# Patient Record
Sex: FEMALE | Race: WHITE | NOT HISPANIC OR LATINO | Employment: FULL TIME | URBAN - NONMETROPOLITAN AREA
[De-identification: names, ages, dates, MRNs, and addresses within clinical notes are randomized per-mention and may not be internally consistent; named-entity substitution may affect disease eponyms.]

---

## 2017-03-29 ENCOUNTER — HOSPITAL ENCOUNTER (OUTPATIENT)
Dept: FAMILY MEDICINE CLINIC | Facility: CLINIC | Age: 42
Setting detail: SPECIMEN
Discharge: HOME | End: 2017-03-29
Attending: NURSE PRACTITIONER | Admitting: NURSE PRACTITIONER

## 2017-03-29 LAB
ALBUMIN SERPL-MCNC: 4 G/DL (ref 3.5–4.8)
ALBUMIN/GLOB SERPL: 1.1 {RATIO} (ref 1–1.7)
ALP SERPL-CCNC: 31 IU/L (ref 32–91)
ALT SERPL-CCNC: 32 IU/L (ref 14–54)
ANION GAP SERPL CALC-SCNC: 18.8 MMOL/L (ref 10–20)
AST SERPL-CCNC: 22 IU/L (ref 15–41)
BILIRUB SERPL-MCNC: 0.6 MG/DL (ref 0.3–1.2)
BUN SERPL-MCNC: 11 MG/DL (ref 8–20)
BUN/CREAT SERPL: 15.7 (ref 5.4–26.2)
CALCIUM SERPL-MCNC: 10.2 MG/DL (ref 8.9–10.3)
CHLORIDE SERPL-SCNC: 103 MMOL/L (ref 101–111)
CHOLEST SERPL-MCNC: 212 MG/DL
CHOLEST/HDLC SERPL: 4.6 {RATIO}
CONV CO2: 24 MMOL/L (ref 22–32)
CONV LDL CHOLESTEROL DIRECT: 130 MG/DL (ref 0–100)
CONV TOTAL PROTEIN: 7.5 G/DL (ref 6.1–7.9)
CREAT UR-MCNC: 0.7 MG/DL (ref 0.4–1)
GLOBULIN UR ELPH-MCNC: 3.5 G/DL (ref 2.5–3.8)
GLUCOSE SERPL-MCNC: 94 MG/DL (ref 65–99)
HDLC SERPL-MCNC: 46 MG/DL
LDLC/HDLC SERPL: 2.8 {RATIO}
LIPID INTERPRETATION: ABNORMAL
POTASSIUM SERPL-SCNC: 3.8 MMOL/L (ref 3.6–5.1)
SODIUM SERPL-SCNC: 142 MMOL/L (ref 136–144)
TRIGL SERPL-MCNC: 249 MG/DL
VLDLC SERPL CALC-MCNC: 35.9 MG/DL

## 2017-10-05 ENCOUNTER — HOSPITAL ENCOUNTER (OUTPATIENT)
Dept: FAMILY MEDICINE CLINIC | Facility: CLINIC | Age: 42
Setting detail: SPECIMEN
Discharge: HOME | End: 2017-10-05
Attending: FAMILY MEDICINE | Admitting: FAMILY MEDICINE

## 2017-10-05 LAB
ALBUMIN SERPL-MCNC: 4.1 G/DL (ref 3.5–4.8)
ALBUMIN/GLOB SERPL: 1.2 {RATIO} (ref 1–1.7)
ALP SERPL-CCNC: 32 IU/L (ref 32–91)
ALT SERPL-CCNC: 29 IU/L (ref 14–54)
ANION GAP SERPL CALC-SCNC: 12.3 MMOL/L (ref 10–20)
AST SERPL-CCNC: 22 IU/L (ref 15–41)
BILIRUB SERPL-MCNC: 0.5 MG/DL (ref 0.3–1.2)
BUN SERPL-MCNC: 8 MG/DL (ref 8–20)
BUN/CREAT SERPL: 10 (ref 5.4–26.2)
CALCIUM SERPL-MCNC: 9.7 MG/DL (ref 8.9–10.3)
CHLORIDE SERPL-SCNC: 106 MMOL/L (ref 101–111)
CHOLEST SERPL-MCNC: 191 MG/DL
CHOLEST/HDLC SERPL: 4.4 {RATIO}
CONV CO2: 26 MMOL/L (ref 22–32)
CONV LDL CHOLESTEROL DIRECT: 118 MG/DL (ref 0–100)
CONV TOTAL PROTEIN: 7.5 G/DL (ref 6.1–7.9)
CREAT UR-MCNC: 0.8 MG/DL (ref 0.4–1)
GLOBULIN UR ELPH-MCNC: 3.4 G/DL (ref 2.5–3.8)
GLUCOSE SERPL-MCNC: 105 MG/DL (ref 65–99)
HDLC SERPL-MCNC: 43 MG/DL
LDLC/HDLC SERPL: 2.7 {RATIO}
LIPID INTERPRETATION: ABNORMAL
POTASSIUM SERPL-SCNC: 4.3 MMOL/L (ref 3.6–5.1)
SODIUM SERPL-SCNC: 140 MMOL/L (ref 136–144)
TRIGL SERPL-MCNC: 231 MG/DL
TSH SERPL-ACNC: 1.39 UIU/ML (ref 0.34–5.6)
VLDLC SERPL CALC-MCNC: 29.3 MG/DL

## 2018-10-16 ENCOUNTER — HOSPITAL ENCOUNTER (OUTPATIENT)
Dept: FAMILY MEDICINE CLINIC | Facility: CLINIC | Age: 43
Setting detail: SPECIMEN
Discharge: HOME | End: 2018-10-16
Attending: FAMILY MEDICINE | Admitting: FAMILY MEDICINE

## 2018-10-16 LAB
ALBUMIN SERPL-MCNC: 4.1 G/DL (ref 3.5–4.8)
ALBUMIN/GLOB SERPL: 1.4 {RATIO} (ref 1–1.7)
ALP SERPL-CCNC: 34 IU/L (ref 32–91)
ALT SERPL-CCNC: 28 IU/L (ref 14–54)
ANION GAP SERPL CALC-SCNC: 14.8 MMOL/L (ref 10–20)
AST SERPL-CCNC: 17 IU/L (ref 15–41)
BILIRUB SERPL-MCNC: 0.7 MG/DL (ref 0.3–1.2)
BUN SERPL-MCNC: 8 MG/DL (ref 8–20)
BUN/CREAT SERPL: 13.3 (ref 5.4–26.2)
CALCIUM SERPL-MCNC: 9.6 MG/DL (ref 8.9–10.3)
CHLORIDE SERPL-SCNC: 102 MMOL/L (ref 101–111)
CHOLEST SERPL-MCNC: 243 MG/DL
CHOLEST/HDLC SERPL: 5.3 {RATIO}
CONV CO2: 27 MMOL/L (ref 22–32)
CONV LDL CHOLESTEROL DIRECT: 170 MG/DL (ref 0–100)
CONV TOTAL PROTEIN: 7.1 G/DL (ref 6.1–7.9)
CREAT UR-MCNC: 0.6 MG/DL (ref 0.4–1)
GLOBULIN UR ELPH-MCNC: 3 G/DL (ref 2.5–3.8)
GLUCOSE SERPL-MCNC: 87 MG/DL (ref 65–99)
HDLC SERPL-MCNC: 46 MG/DL
LDLC/HDLC SERPL: 3.7 {RATIO}
LIPID INTERPRETATION: ABNORMAL
POTASSIUM SERPL-SCNC: 3.8 MMOL/L (ref 3.6–5.1)
SODIUM SERPL-SCNC: 140 MMOL/L (ref 136–144)
TRIGL SERPL-MCNC: 361 MG/DL
VLDLC SERPL CALC-MCNC: 26.9 MG/DL

## 2018-10-17 LAB — HBA1C MFR BLD: 5.3 % (ref 0–5.6)

## 2019-01-11 ENCOUNTER — HOSPITAL ENCOUNTER (OUTPATIENT)
Dept: FAMILY MEDICINE CLINIC | Facility: CLINIC | Age: 44
Setting detail: SPECIMEN
Discharge: HOME | End: 2019-01-11
Attending: FAMILY MEDICINE | Admitting: FAMILY MEDICINE

## 2019-01-11 LAB
ALBUMIN SERPL-MCNC: 4.1 G/DL (ref 3.5–4.8)
ALBUMIN/GLOB SERPL: 1.2 {RATIO} (ref 1–1.7)
ALP SERPL-CCNC: 35 IU/L (ref 32–91)
ALT SERPL-CCNC: 22 IU/L (ref 14–54)
ANION GAP SERPL CALC-SCNC: 14.1 MMOL/L (ref 10–20)
AST SERPL-CCNC: 18 IU/L (ref 15–41)
BILIRUB SERPL-MCNC: 0.5 MG/DL (ref 0.3–1.2)
BUN SERPL-MCNC: 10 MG/DL (ref 8–20)
BUN/CREAT SERPL: 14.3 (ref 5.4–26.2)
CALCIUM SERPL-MCNC: 9.7 MG/DL (ref 8.9–10.3)
CHLORIDE SERPL-SCNC: 105 MMOL/L (ref 101–111)
CHOLEST SERPL-MCNC: 174 MG/DL
CHOLEST/HDLC SERPL: 4 {RATIO}
CONV CO2: 23 MMOL/L (ref 22–32)
CONV LDL CHOLESTEROL DIRECT: 107 MG/DL (ref 0–100)
CONV TOTAL PROTEIN: 7.4 G/DL (ref 6.1–7.9)
CREAT UR-MCNC: 0.7 MG/DL (ref 0.4–1)
GLOBULIN UR ELPH-MCNC: 3.3 G/DL (ref 2.5–3.8)
GLUCOSE SERPL-MCNC: 112 MG/DL (ref 65–99)
HDLC SERPL-MCNC: 44 MG/DL
LDLC/HDLC SERPL: 2.4 {RATIO}
LIPID INTERPRETATION: ABNORMAL
POTASSIUM SERPL-SCNC: 4.1 MMOL/L (ref 3.6–5.1)
SODIUM SERPL-SCNC: 138 MMOL/L (ref 136–144)
TRIGL SERPL-MCNC: 208 MG/DL
VLDLC SERPL CALC-MCNC: 23.3 MG/DL

## 2019-04-19 ENCOUNTER — HOSPITAL ENCOUNTER (OUTPATIENT)
Dept: FAMILY MEDICINE CLINIC | Facility: CLINIC | Age: 44
Setting detail: SPECIMEN
Discharge: HOME | End: 2019-04-19
Attending: FAMILY MEDICINE | Admitting: FAMILY MEDICINE

## 2019-04-19 LAB
ALBUMIN SERPL-MCNC: 4.1 G/DL (ref 3.5–4.8)
ALBUMIN/GLOB SERPL: 1.3 {RATIO} (ref 1–1.7)
ALP SERPL-CCNC: 34 IU/L (ref 32–91)
ALT SERPL-CCNC: 24 IU/L (ref 14–54)
ANION GAP SERPL CALC-SCNC: 14.3 MMOL/L (ref 10–20)
AST SERPL-CCNC: 15 IU/L (ref 15–41)
BILIRUB SERPL-MCNC: 0.8 MG/DL (ref 0.3–1.2)
BUN SERPL-MCNC: 8 MG/DL (ref 8–20)
BUN/CREAT SERPL: 10 (ref 5.4–26.2)
CALCIUM SERPL-MCNC: 9.8 MG/DL (ref 8.9–10.3)
CHLORIDE SERPL-SCNC: 107 MMOL/L (ref 101–111)
CHOLEST SERPL-MCNC: 150 MG/DL
CHOLEST/HDLC SERPL: 3.7 {RATIO}
CONV CO2: 24 MMOL/L (ref 22–32)
CONV LDL CHOLESTEROL DIRECT: 93 MG/DL (ref 0–100)
CONV TOTAL PROTEIN: 7.2 G/DL (ref 6.1–7.9)
CREAT UR-MCNC: 0.8 MG/DL (ref 0.4–1)
GLOBULIN UR ELPH-MCNC: 3.1 G/DL (ref 2.5–3.8)
GLUCOSE SERPL-MCNC: 94 MG/DL (ref 65–99)
HDLC SERPL-MCNC: 41 MG/DL
LDLC/HDLC SERPL: 2.3 {RATIO}
LIPID INTERPRETATION: NORMAL
POTASSIUM SERPL-SCNC: 4.3 MMOL/L (ref 3.6–5.1)
SODIUM SERPL-SCNC: 141 MMOL/L (ref 136–144)
TRIGL SERPL-MCNC: 118 MG/DL
VLDLC SERPL CALC-MCNC: 16.3 MG/DL

## 2019-07-29 DIAGNOSIS — R73.9 HYPERGLYCEMIA: ICD-10-CM

## 2019-07-29 DIAGNOSIS — K21.9 GASTROESOPHAGEAL REFLUX DISEASE WITHOUT ESOPHAGITIS: ICD-10-CM

## 2019-07-29 DIAGNOSIS — E78.5 HYPERLIPIDEMIA, UNSPECIFIED HYPERLIPIDEMIA TYPE: Primary | ICD-10-CM

## 2019-07-29 DIAGNOSIS — E66.9 CLASS 2 OBESITY WITHOUT SERIOUS COMORBIDITY IN ADULT, UNSPECIFIED BMI, UNSPECIFIED OBESITY TYPE: ICD-10-CM

## 2019-11-22 ENCOUNTER — CLINICAL SUPPORT (OUTPATIENT)
Dept: FAMILY MEDICINE CLINIC | Facility: CLINIC | Age: 44
End: 2019-11-22

## 2019-11-22 DIAGNOSIS — Z23 NEED FOR IMMUNIZATION AGAINST INFLUENZA: Primary | ICD-10-CM

## 2019-11-22 LAB
ALBUMIN SERPL-MCNC: 4.8 G/DL (ref 3.5–5.2)
ALBUMIN/GLOB SERPL: 1.5 G/DL
ALP SERPL-CCNC: 36 U/L (ref 39–117)
ALT SERPL W P-5'-P-CCNC: 17 U/L (ref 1–33)
ANION GAP SERPL CALCULATED.3IONS-SCNC: 11.5 MMOL/L (ref 5–15)
AST SERPL-CCNC: 14 U/L (ref 1–32)
BASOPHILS # BLD AUTO: 0.09 10*3/MM3 (ref 0–0.2)
BASOPHILS NFR BLD AUTO: 0.9 % (ref 0–1.5)
BILIRUB SERPL-MCNC: 0.5 MG/DL (ref 0.2–1.2)
BUN BLD-MCNC: 9 MG/DL (ref 6–20)
BUN/CREAT SERPL: 12.9 (ref 7–25)
CALCIUM SPEC-SCNC: 10.1 MG/DL (ref 8.6–10.5)
CHLORIDE SERPL-SCNC: 103 MMOL/L (ref 98–107)
CHOLEST SERPL-MCNC: 178 MG/DL (ref 0–200)
CO2 SERPL-SCNC: 25.5 MMOL/L (ref 22–29)
CREAT BLD-MCNC: 0.7 MG/DL (ref 0.57–1)
DEPRECATED RDW RBC AUTO: 40.2 FL (ref 37–54)
EOSINOPHIL # BLD AUTO: 0.83 10*3/MM3 (ref 0–0.4)
EOSINOPHIL NFR BLD AUTO: 8.3 % (ref 0.3–6.2)
ERYTHROCYTE [DISTWIDTH] IN BLOOD BY AUTOMATED COUNT: 12 % (ref 12.3–15.4)
GFR SERPL CREATININE-BSD FRML MDRD: 90 ML/MIN/1.73
GLOBULIN UR ELPH-MCNC: 3.3 GM/DL
GLUCOSE BLD-MCNC: 121 MG/DL (ref 65–99)
HBA1C MFR BLD: 5.1 % (ref 3.5–5.6)
HCT VFR BLD AUTO: 43 % (ref 34–46.6)
HDLC SERPL-MCNC: 45 MG/DL (ref 40–60)
HGB BLD-MCNC: 15.4 G/DL (ref 12–15.9)
IMM GRANULOCYTES # BLD AUTO: 0.03 10*3/MM3 (ref 0–0.05)
IMM GRANULOCYTES NFR BLD AUTO: 0.3 % (ref 0–0.5)
LDLC SERPL CALC-MCNC: 83 MG/DL (ref 0–100)
LDLC/HDLC SERPL: 1.84 {RATIO}
LYMPHOCYTES # BLD AUTO: 3.45 10*3/MM3 (ref 0.7–3.1)
LYMPHOCYTES NFR BLD AUTO: 34.3 % (ref 19.6–45.3)
MCH RBC QN AUTO: 33 PG (ref 26.6–33)
MCHC RBC AUTO-ENTMCNC: 35.8 G/DL (ref 31.5–35.7)
MCV RBC AUTO: 92.1 FL (ref 79–97)
MONOCYTES # BLD AUTO: 0.56 10*3/MM3 (ref 0.1–0.9)
MONOCYTES NFR BLD AUTO: 5.6 % (ref 5–12)
NEUTROPHILS # BLD AUTO: 5.1 10*3/MM3 (ref 1.7–7)
NEUTROPHILS NFR BLD AUTO: 50.6 % (ref 42.7–76)
NRBC BLD AUTO-RTO: 0 /100 WBC (ref 0–0.2)
PLATELET # BLD AUTO: 337 10*3/MM3 (ref 140–450)
PMV BLD AUTO: 11 FL (ref 6–12)
POTASSIUM BLD-SCNC: 4.5 MMOL/L (ref 3.5–5.2)
PROT SERPL-MCNC: 8.1 G/DL (ref 6–8.5)
RBC # BLD AUTO: 4.67 10*6/MM3 (ref 3.77–5.28)
SODIUM BLD-SCNC: 140 MMOL/L (ref 136–145)
TRIGL SERPL-MCNC: 250 MG/DL (ref 0–150)
TSH SERPL DL<=0.05 MIU/L-ACNC: 1.44 UIU/ML (ref 0.27–4.2)
VLDLC SERPL-MCNC: 50 MG/DL (ref 5–40)
WBC NRBC COR # BLD: 10.06 10*3/MM3 (ref 3.4–10.8)

## 2019-11-22 PROCEDURE — 80053 COMPREHEN METABOLIC PANEL: CPT | Performed by: FAMILY MEDICINE

## 2019-11-22 PROCEDURE — 83036 HEMOGLOBIN GLYCOSYLATED A1C: CPT | Performed by: FAMILY MEDICINE

## 2019-11-22 PROCEDURE — 84443 ASSAY THYROID STIM HORMONE: CPT | Performed by: FAMILY MEDICINE

## 2019-11-22 PROCEDURE — 36415 COLL VENOUS BLD VENIPUNCTURE: CPT | Performed by: FAMILY MEDICINE

## 2019-11-22 PROCEDURE — 85025 COMPLETE CBC W/AUTO DIFF WBC: CPT | Performed by: FAMILY MEDICINE

## 2019-11-22 PROCEDURE — 90471 IMMUNIZATION ADMIN: CPT | Performed by: FAMILY MEDICINE

## 2019-11-22 PROCEDURE — 80061 LIPID PANEL: CPT | Performed by: FAMILY MEDICINE

## 2019-11-22 PROCEDURE — 90674 CCIIV4 VAC NO PRSV 0.5 ML IM: CPT | Performed by: FAMILY MEDICINE

## 2019-12-27 RX ORDER — ATORVASTATIN CALCIUM 40 MG/1
TABLET, FILM COATED ORAL
Qty: 90 TABLET | Refills: 0 | Status: SHIPPED | OUTPATIENT
Start: 2019-12-27 | End: 2020-05-08

## 2020-05-08 ENCOUNTER — TELEMEDICINE (OUTPATIENT)
Dept: FAMILY MEDICINE CLINIC | Facility: CLINIC | Age: 45
End: 2020-05-08

## 2020-05-08 ENCOUNTER — HOSPITAL ENCOUNTER (OUTPATIENT)
Facility: HOSPITAL | Age: 45
Setting detail: OBSERVATION
Discharge: HOME | End: 2020-05-09
Attending: INTERNAL MEDICINE | Admitting: INTERNAL MEDICINE

## 2020-05-08 ENCOUNTER — APPOINTMENT (OUTPATIENT)
Dept: GENERAL RADIOLOGY | Facility: HOSPITAL | Age: 45
End: 2020-05-08

## 2020-05-08 DIAGNOSIS — R06.02 SHORTNESS OF BREATH: ICD-10-CM

## 2020-05-08 DIAGNOSIS — R07.9 CHEST PAIN, UNSPECIFIED TYPE: Primary | ICD-10-CM

## 2020-05-08 DIAGNOSIS — R06.00 DYSPNEA, UNSPECIFIED TYPE: ICD-10-CM

## 2020-05-08 DIAGNOSIS — Z20.828 EXPOSURE TO SARS-ASSOCIATED CORONAVIRUS: ICD-10-CM

## 2020-05-08 PROBLEM — I10 HTN (HYPERTENSION): Chronic | Status: ACTIVE | Noted: 2020-05-08

## 2020-05-08 PROBLEM — E78.5 HYPERLIPIDEMIA: Chronic | Status: ACTIVE | Noted: 2019-07-29

## 2020-05-08 PROBLEM — F41.9 ANXIETY DISORDER: Chronic | Status: ACTIVE | Noted: 2020-05-08

## 2020-05-08 PROBLEM — K21.9 GASTROESOPHAGEAL REFLUX DISEASE: Chronic | Status: ACTIVE | Noted: 2019-07-29

## 2020-05-08 LAB
ALBUMIN SERPL-MCNC: 4.7 G/DL (ref 3.5–5.2)
ALBUMIN/GLOB SERPL: 1.4 G/DL
ALP SERPL-CCNC: 33 U/L (ref 39–117)
ALT SERPL W P-5'-P-CCNC: 18 U/L (ref 1–33)
ANION GAP SERPL CALCULATED.3IONS-SCNC: 14 MMOL/L (ref 5–15)
AST SERPL-CCNC: 17 U/L (ref 1–32)
B PERT DNA SPEC QL NAA+PROBE: NOT DETECTED
BASOPHILS # BLD AUTO: 0.1 10*3/MM3 (ref 0–0.2)
BASOPHILS NFR BLD AUTO: 0.5 % (ref 0–1.5)
BILIRUB SERPL-MCNC: 0.5 MG/DL (ref 0.2–1.2)
BUN BLD-MCNC: 11 MG/DL (ref 6–20)
BUN/CREAT SERPL: 15.5 (ref 7–25)
C PNEUM DNA NPH QL NAA+NON-PROBE: NOT DETECTED
CALCIUM SPEC-SCNC: 10.2 MG/DL (ref 8.6–10.5)
CHLORIDE SERPL-SCNC: 102 MMOL/L (ref 98–107)
CK SERPL-CCNC: 51 U/L (ref 20–180)
CO2 SERPL-SCNC: 26 MMOL/L (ref 22–29)
CREAT BLD-MCNC: 0.71 MG/DL (ref 0.57–1)
CRP SERPL-MCNC: 0.22 MG/DL (ref 0–0.5)
D DIMER PPP FEU-MCNC: 0.19 MCGFEU/ML (ref 0.17–0.59)
DEPRECATED RDW RBC AUTO: 40.7 FL (ref 37–54)
EOSINOPHIL # BLD AUTO: 0.6 10*3/MM3 (ref 0–0.4)
EOSINOPHIL NFR BLD AUTO: 5.9 % (ref 0.3–6.2)
ERYTHROCYTE [DISTWIDTH] IN BLOOD BY AUTOMATED COUNT: 12.5 % (ref 12.3–15.4)
FERRITIN SERPL-MCNC: 131.4 NG/ML (ref 13–150)
FLUAV H1 2009 PAND RNA NPH QL NAA+PROBE: NOT DETECTED
FLUAV H1 HA GENE NPH QL NAA+PROBE: NOT DETECTED
FLUAV H3 RNA NPH QL NAA+PROBE: NOT DETECTED
FLUAV SUBTYP SPEC NAA+PROBE: NOT DETECTED
FLUBV RNA ISLT QL NAA+PROBE: NOT DETECTED
GFR SERPL CREATININE-BSD FRML MDRD: 89 ML/MIN/1.73
GLOBULIN UR ELPH-MCNC: 3.3 GM/DL
GLUCOSE BLD-MCNC: 117 MG/DL (ref 65–99)
HADV DNA SPEC NAA+PROBE: NOT DETECTED
HCOV 229E RNA SPEC QL NAA+PROBE: NOT DETECTED
HCOV HKU1 RNA SPEC QL NAA+PROBE: NOT DETECTED
HCOV NL63 RNA SPEC QL NAA+PROBE: NOT DETECTED
HCOV OC43 RNA SPEC QL NAA+PROBE: NOT DETECTED
HCT VFR BLD AUTO: 44.3 % (ref 34–46.6)
HGB BLD-MCNC: 15.4 G/DL (ref 12–15.9)
HMPV RNA NPH QL NAA+NON-PROBE: NOT DETECTED
HOLD SPECIMEN: NORMAL
HOLD SPECIMEN: NORMAL
HPIV1 RNA SPEC QL NAA+PROBE: NOT DETECTED
HPIV2 RNA SPEC QL NAA+PROBE: NOT DETECTED
HPIV3 RNA NPH QL NAA+PROBE: NOT DETECTED
HPIV4 P GENE NPH QL NAA+PROBE: NOT DETECTED
LDH SERPL-CCNC: 164 U/L (ref 135–214)
LYMPHOCYTES # BLD AUTO: 4.6 10*3/MM3 (ref 0.7–3.1)
LYMPHOCYTES NFR BLD AUTO: 44.2 % (ref 19.6–45.3)
M PNEUMO IGG SER IA-ACNC: NOT DETECTED
MAGNESIUM SERPL-MCNC: 1.7 MG/DL (ref 1.6–2.6)
MCH RBC QN AUTO: 32.1 PG (ref 26.6–33)
MCHC RBC AUTO-ENTMCNC: 34.8 G/DL (ref 31.5–35.7)
MCV RBC AUTO: 92 FL (ref 79–97)
MONOCYTES # BLD AUTO: 0.5 10*3/MM3 (ref 0.1–0.9)
MONOCYTES NFR BLD AUTO: 4.9 % (ref 5–12)
NEUTROPHILS # BLD AUTO: 4.7 10*3/MM3 (ref 1.7–7)
NEUTROPHILS NFR BLD AUTO: 44.5 % (ref 42.7–76)
NRBC BLD AUTO-RTO: 0.1 /100 WBC (ref 0–0.2)
NT-PROBNP SERPL-MCNC: 33.8 PG/ML (ref 5–450)
PLATELET # BLD AUTO: 316 10*3/MM3 (ref 140–450)
PMV BLD AUTO: 8.7 FL (ref 6–12)
POTASSIUM BLD-SCNC: 3.5 MMOL/L (ref 3.5–5.2)
PROCALCITONIN SERPL-MCNC: 0.03 NG/ML (ref 0.1–0.25)
PROT SERPL-MCNC: 8 G/DL (ref 6–8.5)
RBC # BLD AUTO: 4.81 10*6/MM3 (ref 3.77–5.28)
RHINOVIRUS RNA SPEC NAA+PROBE: NOT DETECTED
RSV RNA NPH QL NAA+NON-PROBE: NOT DETECTED
SARS-COV-2 RNA RESP QL NAA+PROBE: NOT DETECTED
SODIUM BLD-SCNC: 142 MMOL/L (ref 136–145)
TROPONIN T SERPL-MCNC: <0.01 NG/ML (ref 0–0.03)
TROPONIN T SERPL-MCNC: <0.01 NG/ML (ref 0–0.03)
TSH SERPL DL<=0.05 MIU/L-ACNC: 2.11 UIU/ML (ref 0.27–4.2)
WBC NRBC COR # BLD: 10.5 10*3/MM3 (ref 3.4–10.8)
WHOLE BLOOD HOLD SPECIMEN: NORMAL
WHOLE BLOOD HOLD SPECIMEN: NORMAL

## 2020-05-08 PROCEDURE — 84484 ASSAY OF TROPONIN QUANT: CPT | Performed by: NURSE PRACTITIONER

## 2020-05-08 PROCEDURE — 80053 COMPREHEN METABOLIC PANEL: CPT | Performed by: PHYSICIAN ASSISTANT

## 2020-05-08 PROCEDURE — 84443 ASSAY THYROID STIM HORMONE: CPT | Performed by: NURSE PRACTITIONER

## 2020-05-08 PROCEDURE — 83880 ASSAY OF NATRIURETIC PEPTIDE: CPT | Performed by: PHYSICIAN ASSISTANT

## 2020-05-08 PROCEDURE — G0378 HOSPITAL OBSERVATION PER HR: HCPCS

## 2020-05-08 PROCEDURE — 99284 EMERGENCY DEPT VISIT MOD MDM: CPT

## 2020-05-08 PROCEDURE — 71045 X-RAY EXAM CHEST 1 VIEW: CPT

## 2020-05-08 PROCEDURE — 83735 ASSAY OF MAGNESIUM: CPT | Performed by: NURSE PRACTITIONER

## 2020-05-08 PROCEDURE — 0099U HC BIOFIRE FILMARRAY RESP PANEL 1: CPT | Performed by: INTERNAL MEDICINE

## 2020-05-08 PROCEDURE — 93005 ELECTROCARDIOGRAM TRACING: CPT | Performed by: INTERNAL MEDICINE

## 2020-05-08 PROCEDURE — 85379 FIBRIN DEGRADATION QUANT: CPT | Performed by: NURSE PRACTITIONER

## 2020-05-08 PROCEDURE — 99220 PR INITIAL OBSERVATION CARE/DAY 70 MINUTES: CPT | Performed by: INTERNAL MEDICINE

## 2020-05-08 PROCEDURE — 83615 LACTATE (LD) (LDH) ENZYME: CPT | Performed by: PHYSICIAN ASSISTANT

## 2020-05-08 PROCEDURE — 86140 C-REACTIVE PROTEIN: CPT | Performed by: PHYSICIAN ASSISTANT

## 2020-05-08 PROCEDURE — 82550 ASSAY OF CK (CPK): CPT | Performed by: NURSE PRACTITIONER

## 2020-05-08 PROCEDURE — 99213 OFFICE O/P EST LOW 20 MIN: CPT | Performed by: FAMILY MEDICINE

## 2020-05-08 PROCEDURE — 87635 SARS-COV-2 COVID-19 AMP PRB: CPT | Performed by: INTERNAL MEDICINE

## 2020-05-08 PROCEDURE — 85025 COMPLETE CBC W/AUTO DIFF WBC: CPT | Performed by: PHYSICIAN ASSISTANT

## 2020-05-08 PROCEDURE — 84145 PROCALCITONIN (PCT): CPT | Performed by: PHYSICIAN ASSISTANT

## 2020-05-08 PROCEDURE — 93005 ELECTROCARDIOGRAM TRACING: CPT

## 2020-05-08 PROCEDURE — 84484 ASSAY OF TROPONIN QUANT: CPT | Performed by: PHYSICIAN ASSISTANT

## 2020-05-08 PROCEDURE — 82728 ASSAY OF FERRITIN: CPT | Performed by: NURSE PRACTITIONER

## 2020-05-08 RX ORDER — SERTRALINE HYDROCHLORIDE 100 MG/1
100 TABLET, FILM COATED ORAL DAILY
Status: DISCONTINUED | OUTPATIENT
Start: 2020-05-08 | End: 2020-05-09 | Stop reason: HOSPADM

## 2020-05-08 RX ORDER — ONDANSETRON 4 MG/1
4 TABLET, FILM COATED ORAL EVERY 6 HOURS PRN
Status: DISCONTINUED | OUTPATIENT
Start: 2020-05-08 | End: 2020-05-09 | Stop reason: HOSPADM

## 2020-05-08 RX ORDER — SODIUM CHLORIDE 0.9 % (FLUSH) 0.9 %
10 SYRINGE (ML) INJECTION EVERY 12 HOURS SCHEDULED
Status: DISCONTINUED | OUTPATIENT
Start: 2020-05-08 | End: 2020-05-09 | Stop reason: HOSPADM

## 2020-05-08 RX ORDER — SODIUM CHLORIDE 0.9 % (FLUSH) 0.9 %
10 SYRINGE (ML) INJECTION AS NEEDED
Status: DISCONTINUED | OUTPATIENT
Start: 2020-05-08 | End: 2020-05-09 | Stop reason: HOSPADM

## 2020-05-08 RX ORDER — ACETAMINOPHEN 650 MG/1
650 SUPPOSITORY RECTAL EVERY 4 HOURS PRN
Status: DISCONTINUED | OUTPATIENT
Start: 2020-05-08 | End: 2020-05-09 | Stop reason: HOSPADM

## 2020-05-08 RX ORDER — ATORVASTATIN CALCIUM 40 MG/1
40 TABLET, FILM COATED ORAL NIGHTLY
COMMUNITY
End: 2020-05-18

## 2020-05-08 RX ORDER — PANTOPRAZOLE SODIUM 40 MG/1
40 TABLET, DELAYED RELEASE ORAL EVERY MORNING
Status: DISCONTINUED | OUTPATIENT
Start: 2020-05-09 | End: 2020-05-09

## 2020-05-08 RX ORDER — MAGNESIUM SULFATE HEPTAHYDRATE 40 MG/ML
2 INJECTION, SOLUTION INTRAVENOUS AS NEEDED
Status: DISCONTINUED | OUTPATIENT
Start: 2020-05-08 | End: 2020-05-09 | Stop reason: HOSPADM

## 2020-05-08 RX ORDER — ASPIRIN 325 MG
325 TABLET, DELAYED RELEASE (ENTERIC COATED) ORAL DAILY
Status: DISCONTINUED | OUTPATIENT
Start: 2020-05-09 | End: 2020-05-09 | Stop reason: HOSPADM

## 2020-05-08 RX ORDER — HYDRALAZINE HYDROCHLORIDE 20 MG/ML
10 INJECTION INTRAMUSCULAR; INTRAVENOUS EVERY 6 HOURS PRN
Status: DISCONTINUED | OUTPATIENT
Start: 2020-05-08 | End: 2020-05-09 | Stop reason: HOSPADM

## 2020-05-08 RX ORDER — ASPIRIN 81 MG/1
324 TABLET, CHEWABLE ORAL ONCE
Status: COMPLETED | OUTPATIENT
Start: 2020-05-08 | End: 2020-05-08

## 2020-05-08 RX ORDER — MAGNESIUM SULFATE 1 G/100ML
1 INJECTION INTRAVENOUS AS NEEDED
Status: DISCONTINUED | OUTPATIENT
Start: 2020-05-08 | End: 2020-05-09 | Stop reason: HOSPADM

## 2020-05-08 RX ORDER — POTASSIUM CHLORIDE 20 MEQ/1
40 TABLET, EXTENDED RELEASE ORAL AS NEEDED
Status: DISCONTINUED | OUTPATIENT
Start: 2020-05-08 | End: 2020-05-09 | Stop reason: HOSPADM

## 2020-05-08 RX ORDER — ATORVASTATIN CALCIUM 40 MG/1
40 TABLET, FILM COATED ORAL NIGHTLY
Status: DISCONTINUED | OUTPATIENT
Start: 2020-05-08 | End: 2020-05-09 | Stop reason: HOSPADM

## 2020-05-08 RX ORDER — FOLIC ACID/MULTIVIT,IRON,MINER .4-18-35
1 TABLET,CHEWABLE ORAL DAILY
Status: DISCONTINUED | OUTPATIENT
Start: 2020-05-08 | End: 2020-05-09 | Stop reason: HOSPADM

## 2020-05-08 RX ORDER — NITROGLYCERIN 0.4 MG/1
0.4 TABLET SUBLINGUAL
Status: DISCONTINUED | OUTPATIENT
Start: 2020-05-08 | End: 2020-05-09 | Stop reason: HOSPADM

## 2020-05-08 RX ORDER — AMLODIPINE BESYLATE 5 MG/1
5 TABLET ORAL
Status: DISCONTINUED | OUTPATIENT
Start: 2020-05-08 | End: 2020-05-09 | Stop reason: HOSPADM

## 2020-05-08 RX ORDER — ACETAMINOPHEN 325 MG/1
650 TABLET ORAL EVERY 4 HOURS PRN
Status: DISCONTINUED | OUTPATIENT
Start: 2020-05-08 | End: 2020-05-09 | Stop reason: HOSPADM

## 2020-05-08 RX ORDER — ONDANSETRON 2 MG/ML
4 INJECTION INTRAMUSCULAR; INTRAVENOUS EVERY 6 HOURS PRN
Status: DISCONTINUED | OUTPATIENT
Start: 2020-05-08 | End: 2020-05-09 | Stop reason: HOSPADM

## 2020-05-08 RX ORDER — ACETAMINOPHEN 160 MG/5ML
650 SOLUTION ORAL EVERY 4 HOURS PRN
Status: DISCONTINUED | OUTPATIENT
Start: 2020-05-08 | End: 2020-05-09 | Stop reason: HOSPADM

## 2020-05-08 RX ADMIN — Medication 10 ML: at 13:47

## 2020-05-08 RX ADMIN — ASPIRIN 81 MG 324 MG: 81 TABLET ORAL at 10:27

## 2020-05-08 RX ADMIN — Medication 10 ML: at 20:34

## 2020-05-08 RX ADMIN — Medication 1 TABLET: at 13:41

## 2020-05-08 RX ADMIN — ATORVASTATIN CALCIUM 40 MG: 40 TABLET, FILM COATED ORAL at 20:33

## 2020-05-08 RX ADMIN — SERTRALINE 100 MG: 100 TABLET, FILM COATED ORAL at 13:41

## 2020-05-08 RX ADMIN — AMLODIPINE BESYLATE 5 MG: 5 TABLET ORAL at 13:41

## 2020-05-08 NOTE — H&P
"      [facility] Medicine Services      Patient Name: Evelyn Garcia  : 1975  MRN: [id number]  Primary Care Physician: Kathia Cooper MD  Date of admission: 2020    Patient Care Team:  Kathia Cooper MD as PCP - General          Subjective   History Present Illness     Chief Complaint:   Chief Complaint   Patient presents with   • Shortness of Breath       Ms. Garcia is a 45 y.o. with a past medical history of anxiety, GERD, and hyperlipidemia who presented to [facility] on 2020 with complaints of chest pain shortness of breath.  Patient states her chest pain started yesterday.  Her chest pain has been radiating into her jaw.  Resting makes her chest pain better.  She denies any aggravating factors.  She describes her chest pain as \"heaviness \"and rates it at 4 out of 10 at this time.  This morning she had a telehealth visit with her PCP around 8:30 AM and was told to come to the ED.  Patient also reported she has had been more short of breath that started at the beginning of this week.  She denies any aggravating or alleviating factors.  Patient reports she had nausea, loss of taste and smell, and mild diarrhea last week.  She is also had a cough, however blamed this on seasonal allergies.  Patient denies any recent vomiting, fever, chills, or coming in contact with anyone sick.  Patient states she is never had a stress test.  Her mom and dad both passed away of cardiac disease.    In the ED, chest x-ray unremarkable.  EKG showed sinus rhythm.  All labs unremarkable except pro Fabian 0.03.  Respiratory panel pending. COVID test pending.  All vital signs stable upon admission except blood pressure 154/101.  Patient received aspirin in the ED.    Review of Systems   HENT:        Lost of taste and smell last week   Cardiovascular: Positive for chest pain.   Respiratory: Positive for cough and shortness of breath.    Skin: Negative.    Musculoskeletal: Negative.    "   Gastrointestinal: Positive for diarrhea and nausea.   Genitourinary: Negative.    Neurological: Negative.    Psychiatric/Behavioral: Negative.            Personal History     Past Medical History:   Past Medical History:   Diagnosis Date   • Anxiety    • GERD (gastroesophageal reflux disease)    • Hypertension        Surgical History:      Past Surgical History:   Procedure Laterality Date   •  SECTION      X2   • CHOLECYSTECTOMY     • HERNIA REPAIR      X3   • HYSTERECTOMY     • RECTAL POLYPECTOMY             Family History: family history includes Heart disease in her father and mother. Otherwise pertinent FHx was reviewed and unremarkable.     Social History:  reports that she has never smoked. She has never used smokeless tobacco. She reports that she drinks alcohol. She reports that she does not use drugs.      Medications:  Prior to Admission medications    Medication Sig Start Date End Date Taking? Authorizing Provider   atorvastatin (LIPITOR) 40 MG tablet Take 40 mg by mouth Every Night.   Yes Provider, MD Monica   lansoprazole (PREVACID) 15 MG capsule Take 15 mg by mouth Daily. 17  Yes Emergency, Nurse Epic, RN   Multiple Vitamins-Minerals (MULTIVITAMIN ADULT PO) Take 1 tablet by mouth Daily.   Yes Emergency, Nurse Evelyn RN   sertraline (ZOLOFT) 100 MG tablet Take 100 mg by mouth Daily. 17  Yes Emergency, Nurse CARMEN Rivas   albuterol sulfate HFA (ProAir HFA) 108 (90 Base) MCG/ACT inhaler  3/19/15 5/8/20  Emergency, Nurse Evelyn RN   aspirin 81 MG EC tablet Take 81 mg by mouth Daily.  20  Emergency, Nurse Evelyn RN   atorvastatin (LIPITOR) 40 MG tablet TAKE 1 TABLET BY MOUTH ONCE DAILY 19  Kathia Cooper MD   B COMPLEX-C-E PO Take  by mouth Daily.  20  Emergency, Nurse CARMEN Rivas   Cholecalciferol (VITAMIN D3) 125 MCG (5000 UT) capsule capsule Take  by mouth Daily.  20  Emergency, Nurse Evelyn RN   esomeprazole (nexIUM) 40 MG capsule Take 40 mg by mouth Daily.   5/8/20  Emergency, Nurse Evelyn, RN   Ibuprofen 200 MG capsule Take 200 mg by mouth.  5/8/20  Emergency, Nurse Evelyn, RN   methylPREDNISolone (MEDROL, DEBRA,) 4 MG tablet Take as directed on package instructions. 3/1/20 5/8/20  Nela Sprague PA   oxybutynin XL (DITROPAN-XL) 10 MG 24 hr tablet  3/29/17 5/8/20  Emergency, Nurse Evelyn RN       Allergies:    Allergies   Allergen Reactions   • Butorphanol Unknown - High Severity     Slows breathing-unresponsive     • Hydromorphone Hcl Unknown - High Severity     Slows breathing -unresponsive   • Nalbuphine Unknown - High Severity       Objective   Objective     Vital Signs  Temp:  [97.8 °F (36.6 °C)-98 °F (36.7 °C)] 98 °F (36.7 °C)  Heart Rate:  [70-80] 80  Resp:  [16-22] 16  BP: (136-154)/() 136/89  SpO2:  [97 %-99 %] 97 %  on   ;   Device (Oxygen Therapy): room air  Body mass index is 29.41 kg/m².    Physical Exam ATTENDING MD TO COMPLETE     Results Review:  I have personally reviewed most recent cardiac tracings, lab results and radiology images and interpretations and agree with findings.    Results from last 7 days   Lab Units 05/08/20  0959   WBC 10*3/mm3 10.50   HEMOGLOBIN g/dL 15.4   HEMATOCRIT % 44.3   PLATELETS 10*3/mm3 316     Results from last 7 days   Lab Units 05/08/20  0959   SODIUM mmol/L 142   POTASSIUM mmol/L 3.5   CHLORIDE mmol/L 102   CO2 mmol/L 26.0   BUN mg/dL 11   CREATININE mg/dL 0.71   GLUCOSE mg/dL 117*   CALCIUM mg/dL 10.2   ALT (SGPT) U/L 18   AST (SGOT) U/L 17   TROPONIN T ng/mL <0.010   PROBNP pg/mL 33.8   PROCALCITONIN ng/mL 0.03*     Estimated Creatinine Clearance: 85.3 mL/min (by C-G formula based on SCr of 0.71 mg/dL).  Brief Urine Lab Results     None          Microbiology Results (last 10 days)     Procedure Component Value - Date/Time    Respiratory Panel, PCR - Swab, Nasopharynx [[id number]]  (Normal) Collected:  05/08/20 0959    Lab Status:  Final result Specimen:  Swab from Nasopharynx Updated:  05/08/20 1145     ADENOVIRUS,  PCR Not Detected     Coronavirus 229E Not Detected     Coronavirus HKU1 Not Detected     Coronavirus NL63 Not Detected     Coronavirus OC43 Not Detected     Human Metapneumovirus Not Detected     Human Rhinovirus/Enterovirus Not Detected     Influenza B PCR Not Detected     Parainfluenza Virus 1 Not Detected     Parainfluenza Virus 2 Not Detected     Parainfluenza Virus 3 Not Detected     Parainfluenza Virus 4 Not Detected     Bordetella pertussis pcr Not Detected     Influenza A H1 2009 PCR Not Detected     Chlamydophila pneumoniae PCR Not Detected     Mycoplasma pneumo by PCR Not Detected     Influenza A PCR Not Detected     Influenza A H3 Not Detected     Influenza A H1 Not Detected     RSV, PCR Not Detected    Narrative:       The coronavirus on the RVP is NOT COVID-19 and is NOT indicative of infection with COVID-19.           ECG/EMG Results (most recent)     Procedure Component Value Units Date/Time    ECG 12 Lead [[id number]] Collected:  05/08/20 0955     Updated:  05/08/20 0956    Narrative:       HEART RATE= 79  bpm  RR Interval= 760  ms  PA Interval= 154  ms  P Horizontal Axis= 3  deg  P Front Axis= 44  deg  QRSD Interval= 85  ms  QT Interval= 378  ms  QRS Axis= 27  deg  T Wave Axis= 49  deg  - OTHERWISE NORMAL ECG -  Sinus rhythm  Low voltage, precordial leads  When compared with ECG of 27-Jan-2016 11:27:58,  Significant axis, voltage or hypertrophy change  Electronically Signed By:   Date and Time of Study: 2020-05-08 09:55:12                    Xr Chest Ap    Result Date: 5/8/2020  No acute cardiopulmonary abnormality.  Electronically Signed By-Jesscia Giang MD On:5/8/2020 10:33 AM This report was finalized on [id number] by  Jessica Giang MD.        Estimated Creatinine Clearance: 85.3 mL/min (by C-G formula based on SCr of 0.71 mg/dL).    Assessment/Plan   Assessment/Plan       Active Hospital Problems    Diagnosis  POA   • **Chest pain [R07.9]  Yes     Priority: High   • Shortness of breath [R06.02]   Yes     Priority: High   • HTN (hypertension) [I10]  Yes   • Anxiety disorder [F41.9]  Yes   • Gastroesophageal reflux disease [K21.9]  Yes   • Hyperlipidemia [E78.5]  Yes      Resolved Hospital Problems   No resolved problems to display.       Acute chest pain  - Rule out ACS   -Chest x-ray unremarkable.    - EKG showed sinus rhythm.   -Troponin x1 normal-trend  -proBNP 33.8  -Continuous cardiac monitoring  -Check BMP, TSH, mag  -Aspirin and Nitrostat ordered  -Treadmill stress test ordered    Shortness of breath  - Patient currently on room air   -Chest x-ray unremarkable for infection  -Respiratory panel pending  -COVID test pending  -Pro-Fabian 0.03  -CRP 0.22  -    -White blood cells 10.5-monitor CBC  -Check ferritin, CK, and d-dimer  -Enhanced contact/droplet precautions    Essential hypertension  -Uncontrolled blood pressure 154/101 upon admission  -Monitor blood pressure  -Reported in patient's history, however patient is not on any home medications  -IV hydralazine ordered PRN   - Start Norvasc 5mg daily     GERD  -Continue Prevacid    Hyperlipidemia  -Continue statin    Anxiety  -Continue Zoloft    VTE Prophylaxis- SCD     CODE STATUS:    Code Status and Medical Interventions:   Ordered at: 05/08/20 1247     Level Of Support Discussed With:    Patient     Code Status:    CPR     Medical Interventions (Level of Support Prior to Arrest):    Full           Electronically signed by RADHA Aguilar, 05/08/20, 11:45 AM.  [facility]ist Team

## 2020-05-08 NOTE — ED NOTES
Pt c/o sob since Sunday. Also reports nausea and cx pain. Reports she has not traveled or been around anyone that she know of with corona or that has been sick     Karina Lozano, LPN  05/08/20 5539

## 2020-05-08 NOTE — PROGRESS NOTES
Subjective   Chief Complaint   Patient presents with   • Chest Pain   • Shortness of Breath     Evelyn Garcia is a 45 y.o. female.   You have chosen to receive care through a telehealth visit.  Do you consent to use a video/audio connection for your medical care today? Yes    Chest Pain    This is a new problem. The current episode started in the past 7 days. The onset quality is undetermined. The problem occurs constantly. The problem has been unchanged. The pain is present in the substernal region. The pain is moderate. The quality of the pain is described as pressure. The pain does not radiate. Associated symptoms include nausea and shortness of breath. Pertinent negatives include no abdominal pain, cough, diaphoresis, dizziness, fever, headaches, irregular heartbeat, palpitations, sputum production, syncope or vomiting. The pain is aggravated by nothing. She has tried nothing for the symptoms. Risk factors include obesity.   Her past medical history is significant for anxiety/panic attacks and hypertension.   Her family medical history is significant for CAD.   Shortness of Breath   This is a new problem. The current episode started in the past 7 days. The problem occurs daily. The problem has been waxing and waning. Associated symptoms include chest pain. Pertinent negatives include no abdominal pain, fever, headaches, rash, sore throat, sputum production, syncope or vomiting. Nothing aggravates the symptoms.      Past Medical History:   Diagnosis Date   • Anxiety    • GERD (gastroesophageal reflux disease)    • Hypertension      Past Surgical History:   Procedure Laterality Date   •  SECTION      X2   • CHOLECYSTECTOMY     • HERNIA REPAIR      X3   • HYSTERECTOMY     • RECTAL POLYPECTOMY       Allergies   Allergen Reactions   • Butorphanol Unknown - High Severity     Slows breathing-unresponsive     • Hydromorphone Hcl Unknown - High Severity     Slows breathing -unresponsive   • Nalbuphine Unknown -  High Severity     Social History     Socioeconomic History   • Marital status:      Spouse name: Not on file   • Number of children: Not on file   • Years of education: Not on file   • Highest education level: Not on file   Tobacco Use   • Smoking status: Never Smoker   • Smokeless tobacco: Never Used   Substance and Sexual Activity   • Alcohol use: Yes     Frequency: Never     Comment: SOCIAL   • Drug use: Never   • Sexual activity: Yes     Social History     Tobacco Use   Smoking Status Never Smoker   Smokeless Tobacco Never Used       family history includes Heart disease in her father and mother.  Current Outpatient Medications on File Prior to Visit   Medication Sig Dispense Refill   • albuterol sulfate HFA (PROAIR HFA) 108 (90 Base) MCG/ACT inhaler PROAIR  (90 Base) MCG/ACT AERS     • aspirin 81 MG EC tablet Take 81 mg by mouth Daily.     • atorvastatin (LIPITOR) 40 MG tablet TAKE 1 TABLET BY MOUTH ONCE DAILY 90 tablet 0   • B COMPLEX-C-E PO Take  by mouth Daily.     • Cholecalciferol (VITAMIN D3) 125 MCG (5000 UT) capsule capsule Take  by mouth Daily.     • esomeprazole (nexIUM) 40 MG capsule Take 40 mg by mouth Daily.     • Ibuprofen 200 MG capsule Take 200 mg by mouth.     • lansoprazole (PREVACID) 15 MG capsule PREVACID 15 MG CPDR     • methylPREDNISolone (MEDROL, DEBRA,) 4 MG tablet Take as directed on package instructions. 21 tablet 0   • Multiple Vitamins-Minerals (MULTIVITAMIN ADULT PO) Take 1 tablet by mouth Daily.     • oxybutynin XL (DITROPAN-XL) 10 MG 24 hr tablet      • sertraline (ZOLOFT) 100 MG tablet Every 12 (Twelve) Hours.       No current facility-administered medications on file prior to visit.      Patient Active Problem List   Diagnosis   • Gastroesophageal reflux disease   • Hyperglycemia   • Hyperlipidemia   • Obesity   • Chest pain   • Shortness of breath       The following portions of the patient's history were reviewed and updated as appropriate: allergies, current  medications, past family history, past medical history, past social history, past surgical history and problem list.    Review of Systems   Constitutional: Negative for chills, diaphoresis and fever.   HENT: Negative for sinus pressure and sore throat.    Eyes: Negative for blurred vision.   Respiratory: Positive for shortness of breath. Negative for cough and sputum production.    Cardiovascular: Positive for chest pain. Negative for palpitations and syncope.   Gastrointestinal: Positive for nausea. Negative for abdominal pain and vomiting.   Endocrine: Negative for polyuria.   Skin: Negative for rash.   Neurological: Negative for dizziness and headache.   Hematological: Negative for adenopathy.   Psychiatric/Behavioral: Negative for depressed mood.       Objective   LMP  (LMP Unknown)   Physical Exam   Constitutional: She appears well-developed. No distress.   HENT:   Head: Normocephalic.   Eyes: EOM are normal.   Neck: Normal range of motion.   Pulmonary/Chest: Effort normal.   Neurological: She is alert.   Psychiatric: She has a normal mood and affect.       No visits with results within 1 Week(s) from this visit.   Latest known visit with results is:   Orders Only on 07/29/2019   Component Date Value Ref Range Status   • TSH 11/22/2019 1.440  0.270 - 4.200 uIU/mL Final   • Total Cholesterol 11/22/2019 178  0 - 200 mg/dL Final   • Triglycerides 11/22/2019 250* 0 - 150 mg/dL Final   • HDL Cholesterol 11/22/2019 45  40 - 60 mg/dL Final   • LDL Cholesterol  11/22/2019 83  0 - 100 mg/dL Final   • VLDL Cholesterol 11/22/2019 50* 5 - 40 mg/dL Final   • LDL/HDL Ratio 11/22/2019 1.84   Final   • Glucose 11/22/2019 121* 65 - 99 mg/dL Final   • BUN 11/22/2019 9  6 - 20 mg/dL Final   • Creatinine 11/22/2019 0.70  0.57 - 1.00 mg/dL Final   • Sodium 11/22/2019 140  136 - 145 mmol/L Final   • Potassium 11/22/2019 4.5  3.5 - 5.2 mmol/L Final   • Chloride 11/22/2019 103  98 - 107 mmol/L Final   • CO2 11/22/2019 25.5  22.0 -  29.0 mmol/L Final   • Calcium 11/22/2019 10.1  8.6 - 10.5 mg/dL Final   • Total Protein 11/22/2019 8.1  6.0 - 8.5 g/dL Final   • Albumin 11/22/2019 4.80  3.50 - 5.20 g/dL Final   • ALT (SGPT) 11/22/2019 17  1 - 33 U/L Final   • AST (SGOT) 11/22/2019 14  1 - 32 U/L Final   • Alkaline Phosphatase 11/22/2019 36* 39 - 117 U/L Final   • Total Bilirubin 11/22/2019 0.5  0.2 - 1.2 mg/dL Final   • eGFR Non African Amer 11/22/2019 90  >60 mL/min/1.73 Final   • Globulin 11/22/2019 3.3  gm/dL Final   • A/G Ratio 11/22/2019 1.5  g/dL Final   • BUN/Creatinine Ratio 11/22/2019 12.9  7.0 - 25.0 Final   • Anion Gap 11/22/2019 11.5  5.0 - 15.0 mmol/L Final   • Hemoglobin A1C 11/22/2019 5.1  3.5 - 5.6 % Final   • WBC 11/22/2019 10.06  3.40 - 10.80 10*3/mm3 Final   • RBC 11/22/2019 4.67  3.77 - 5.28 10*6/mm3 Final   • Hemoglobin 11/22/2019 15.4  12.0 - 15.9 g/dL Final   • Hematocrit 11/22/2019 43.0  34.0 - 46.6 % Final   • MCV 11/22/2019 92.1  79.0 - 97.0 fL Final   • MCH 11/22/2019 33.0  26.6 - 33.0 pg Final   • MCHC 11/22/2019 35.8* 31.5 - 35.7 g/dL Final   • RDW 11/22/2019 12.0* 12.3 - 15.4 % Final   • RDW-SD 11/22/2019 40.2  37.0 - 54.0 fl Final   • MPV 11/22/2019 11.0  6.0 - 12.0 fL Final   • Platelets 11/22/2019 337  140 - 450 10*3/mm3 Final   • Neutrophil % 11/22/2019 50.6  42.7 - 76.0 % Final   • Lymphocyte % 11/22/2019 34.3  19.6 - 45.3 % Final   • Monocyte % 11/22/2019 5.6  5.0 - 12.0 % Final   • Eosinophil % 11/22/2019 8.3* 0.3 - 6.2 % Final   • Basophil % 11/22/2019 0.9  0.0 - 1.5 % Final   • Immature Grans % 11/22/2019 0.3  0.0 - 0.5 % Final   • Neutrophils, Absolute 11/22/2019 5.10  1.70 - 7.00 10*3/mm3 Final   • Lymphocytes, Absolute 11/22/2019 3.45* 0.70 - 3.10 10*3/mm3 Final   • Monocytes, Absolute 11/22/2019 0.56  0.10 - 0.90 10*3/mm3 Final   • Eosinophils, Absolute 11/22/2019 0.83* 0.00 - 0.40 10*3/mm3 Final   • Basophils, Absolute 11/22/2019 0.09  0.00 - 0.20 10*3/mm3 Final   • Immature Grans, Absolute 11/22/2019  0.03  0.00 - 0.05 10*3/mm3 Final   • nRBC 11/22/2019 0.0  0.0 - 0.2 /100 WBC Final           Assessment/Plan   Problems Addressed this Visit        Respiratory    Shortness of breath       Nervous and Auditory    Chest pain - Primary     Patient advised to go to the ER now to be evaluated since she is having new onset of chest pain and shortness of breath and family history of both parents with CAD.             Total time spent in evaluation of patient:  15 min

## 2020-05-08 NOTE — ASSESSMENT & PLAN NOTE
Patient advised to go to the ER now to be evaluated since she is having new onset of chest pain and shortness of breath and family history of both parents with CAD.

## 2020-05-08 NOTE — PLAN OF CARE
Problem: Patient Care Overview  Goal: Plan of Care Review  Outcome: Ongoing (interventions implemented as appropriate)  Flowsheets (Taken 5/8/2020 1220)  Progress: no change  Plan of Care Reviewed With: patient  Outcome Summary: new admit  Goal: Individualization and Mutuality  Outcome: Ongoing (interventions implemented as appropriate)  Goal: Discharge Needs Assessment  Outcome: Ongoing (interventions implemented as appropriate)  Goal: Interprofessional Rounds/Family Conf  Outcome: Ongoing (interventions implemented as appropriate)     Problem: Cardiac: ACS (Acute Coronary Syndrome) (Adult)  Goal: Signs and Symptoms of Listed Potential Problems Will be Absent, Minimized or Managed (Cardiac: ACS)  Outcome: Ongoing (interventions implemented as appropriate)

## 2020-05-08 NOTE — ED PROVIDER NOTES
Subjective   History of Present Illness  Patient is a 45-year-old female presents with complaints of dyspnea for the last 5 days.  Patient reports her dyspnea has progressed worse with exertion.  Patient also reports she started having substernal chest pain that starts yesterday when she woke up.  Patient describes as a constant substernal  achy type chest pain that she rates a 5/10 severity.  Patient states it radiates through to her back at times and reports one episode of her chest pain radiating into her jaw last night.  Patient denies any significant cough or hemoptysis.  Does report some intermittent seasonal allergy symptoms including rhinorrhea nasal congestion.  She denies any recent travel surgeries or immobilizations.  She also denies any nausea vomiting diarrhea or abdominal pain.  Patient states she did call her primary care provider who advised her to come to the ED for further evaluation.  Patient states she saw a cardiologist several years ago but is never had a stress test done.  Patient does report sniffing a cardiac history of both her mother and father.  Patient denies any tobacco use or any known exposure to anyone with coronavirus.  Review of Systems   Constitutional: Negative.    HENT: Positive for congestion and rhinorrhea. Negative for ear discharge, ear pain, facial swelling, nosebleeds, sinus pressure, sinus pain and sore throat.    Eyes: Negative for photophobia and visual disturbance.   Respiratory: Positive for shortness of breath. Negative for apnea, cough, choking, chest tightness, wheezing and stridor.    Cardiovascular: Positive for chest pain. Negative for palpitations and leg swelling.   Gastrointestinal: Negative for abdominal distention, abdominal pain, constipation, diarrhea, nausea and vomiting.   Musculoskeletal: Negative for neck pain and neck stiffness.   Skin: Negative.    Neurological: Negative.        Past Medical History:   Diagnosis Date   • Anxiety    • GERD  (gastroesophageal reflux disease)    • Hypertension        Allergies   Allergen Reactions   • Butorphanol Unknown - High Severity     Slows breathing-unresponsive     • Hydromorphone Hcl Unknown - High Severity     Slows breathing -unresponsive   • Nalbuphine Unknown - High Severity       Past Surgical History:   Procedure Laterality Date   •  SECTION      X2   • CHOLECYSTECTOMY     • HERNIA REPAIR      X3   • HYSTERECTOMY     • RECTAL POLYPECTOMY         Family History   Problem Relation Age of Onset   • Heart disease Mother    • Heart disease Father        Social History     Socioeconomic History   • Marital status:      Spouse name: Not on file   • Number of children: Not on file   • Years of education: Not on file   • Highest education level: Not on file   Tobacco Use   • Smoking status: Never Smoker   • Smokeless tobacco: Never Used   Substance and Sexual Activity   • Alcohol use: Yes     Frequency: Never     Comment: SOCIAL   • Drug use: Never   • Sexual activity: Yes           Objective   Physical Exam   Constitutional: She is oriented to person, place, and time. She appears well-developed and well-nourished.  Non-toxic appearance. She does not appear ill. No distress.   HENT:   Head: Normocephalic and atraumatic.   Eyes: Pupils are equal, round, and reactive to light. EOM are normal.   Cardiovascular: Normal rate, regular rhythm, normal heart sounds and intact distal pulses. Exam reveals no gallop and no friction rub.   No murmur heard.  Pulmonary/Chest: Effort normal. No accessory muscle usage or stridor. No tachypnea. No respiratory distress. She has no decreased breath sounds. She has no wheezes. She has no rhonchi. She has no rales. She exhibits no tenderness, no crepitus, no edema and no swelling.   Neurological: She is alert and oriented to person, place, and time. She is not disoriented. No cranial nerve deficit.   Skin: Skin is warm. Capillary refill takes less than 2 seconds. No rash  "noted. She is not diaphoretic. No cyanosis.   Psychiatric: She has a normal mood and affect. Her behavior is normal.   Nursing note and vitals reviewed.      Procedures           ED Course    /96   Pulse 70   Temp 97.8 °F (36.6 °C)   Resp 22   Ht 152.4 cm (60\")   Wt 68.9 kg (151 lb 14.4 oz)   LMP  (LMP Unknown)   SpO2 98%   BMI 29.67 kg/m²   Medications   aspirin chewable tablet 324 mg (324 mg Oral Given 5/8/20 1027)     Labs Reviewed   COMPREHENSIVE METABOLIC PANEL - Abnormal; Notable for the following components:       Result Value    Glucose 117 (*)     Alkaline Phosphatase 33 (*)     All other components within normal limits    Narrative:     GFR Normal >60  Chronic Kidney Disease <60  Kidney Failure <15     PROCALCITONIN - Abnormal; Notable for the following components:    Procalcitonin 0.03 (*)     All other components within normal limits    Narrative:     As a Marker for Sepsis (Non-Neonates):   1. <0.5 ng/mL represents a low risk of severe sepsis and/or septic shock.  1. >2 ng/mL represents a high risk of severe sepsis and/or septic shock.    As a Marker for Lower Respiratory Tract Infections that require antibiotic therapy:  PCT on Admission     Antibiotic Therapy             6-12 Hrs later  > 0.5                Strongly Recommended            >0.25 - <0.5         Recommended  0.1 - 0.25           Discouraged                   Remeasure/reassess PCT  <0.1                 Strongly Discouraged          Remeasure/reassess PCT      As 28 day mortality risk marker: \"Change in Procalcitonin Result\" (> 80 % or <=80 %) if Day 0 (or Day 1) and Day 4 values are available. Refer to http://www.[facility]-pct-calculator.com/   Change in PCT <=80 %   A decrease of PCT levels below or equal to 80 % defines a positive change in PCT test result representing a higher risk for 28-day all-cause mortality of patients diagnosed with severe sepsis or septic shock.  Change in PCT > 80 %   A decrease of PCT levels of " more than 80 % defines a negative change in PCT result representing a lower risk for 28-day all-cause mortality of patients diagnosed with severe sepsis or septic shock.                Results may be falsely decreased if patient taking Biotin.    CBC WITH AUTO DIFFERENTIAL - Abnormal; Notable for the following components:    Monocyte % 4.9 (*)     Lymphocytes, Absolute 4.60 (*)     Eosinophils, Absolute 0.60 (*)     All other components within normal limits   TROPONIN (IN-HOUSE) - Normal    Narrative:     Troponin T Reference Range:  <= 0.03 ng/mL-   Negative for AMI  >0.03 ng/mL-     Abnormal for myocardial necrosis.  Clinicians would have to utilize clinical acumen, EKG, Troponin and serial changes to determine if it is an Acute Myocardial Infarction or myocardial injury due to an underlying chronic condition.       Results may be falsely decreased if patient taking Biotin.     LACTATE DEHYDROGENASE - Normal   C-REACTIVE PROTEIN - Normal   BNP (IN-HOUSE) - Normal    Narrative:     Among patients with dyspnea, NT-proBNP is highly sensitive for the detection of acute congestive heart failure. In addition NT-proBNP of <300 pg/ml effectively rules out acute congestive heart failure with 99% negative predictive value.    Results may be falsely decreased if patient taking Biotin.     RESPIRATORY PANEL, PCR   SARS-COV-2 PCR ([place] IN-HOUSE PERFORMED), NP SWAB IN TRANSPORT MEDIA   RAINBOW DRAW    Narrative:     The following orders were created for panel order [place] Draw.  Procedure                               Abnormality         Status                     ---------                               -----------         ------                     Light Blue Top[[id number]]                                   Final result               Green Top (Gel)[[id number]]                                  Final result               Lavender Top[[id number]]                                     Final result               Gold Top -  SST[[id number]]                                   Final result                 Please view results for these tests on the individual orders.   LIGHT BLUE TOP   GREEN TOP   LAVENDER TOP   GOLD TOP - SST   CBC AND DIFFERENTIAL    Narrative:     The following orders were created for panel order CBC & Differential.  Procedure                               Abnormality         Status                     ---------                               -----------         ------                     CBC Auto Differential[[id number]]        Abnormal            Final result                 Please view results for these tests on the individual orders.     Xr Chest Ap    Result Date: 5/8/2020  No acute cardiopulmonary abnormality.  Electronically Signed By-Jessica Giang MD On:5/8/2020 10:33 AM This report was finalized on [id number] by  Jessica Giang MD.                                           MDM  Number of Diagnoses or Management Options  Chest pain, unspecified type:   Dyspnea, unspecified type:   Exposure to SARS-associated coronavirus:   Diagnosis management comments: Chart Review:  Comorbidity: Hyperlipidemia, anxiety, GERD  Differentials: Acute coronary syndrome, electrolyte abnormality, PE, pneumonia, bronchitis, dissection, CHF     ;this list is not all inclusive and does not constitute the entirety of considered causes  ECG: Interpreted by myself and Dr. johnson shows sinus rhythm rate 79 low voltage in precordial leads otherwise normal EKG previous was reviewed from 1/27/2016.  Labs: CRP LDH proBNP troponin all within normal limits.  Procalcitonin decreased at 0.03.  CBC fairly unremarkable WBC 10.5 hemoglobin 13.4 hematocrit 44.3 platelets 316.  CMP shows glucose 117 BUN 11 creatinine 0.71 sodium 142 potassium 3.5 respiratory panel pending coronavirus pending  Imaging: Was interpreted by physician and reviewed by myself:  Xr Chest Ap  Result Date: 5/8/2020  No acute cardiopulmonary abnormality.  Electronically Signed By-Jessica  MD Padmaja On:5/8/2020 10:33 AM This report was finalized on [id number] by  Jessica Giang MD.    Disposition/Treatment:  Appropriate PPE was worn during exam and throughout all encounters with the patient.  While in the ED IV was placed and labs were obtained patient was given aspirin for her chest pain she was afebrile and appeared nontoxic while in the ED in no respiratory distress speaking in full sentences.  Patient was found to have elevated blood pressure otherwise vitals stable.  Patient's O2 remained above 95% on room air while in the ED.  Lab results were fairly unremarkable states a decreased procalcitonin 0.03.  WBC was within normal limits chest x-ray showed no acute findings.  PE was considered but unlikely due to HPI and physical exam findings patient's PERC score is 0. Troponin, BNP, chest x-ray, EKG all fairly unremarkable however patient's chest pain and dyspnea seems to be worse with exertion patient has not had a cardiac work-up in the past states she did see cardiologist but has never had a cardiac stress test or echo per her report patient's family history is significant for CAD.  Due to patient's continued chest pain and dyspnea with no clear cause patient will be admitted through hospitalist group for cardiac rule out.  Spoke to Tahira ANN for  who agreed for admission patient main stable throughout ED stay.       Amount and/or Complexity of Data Reviewed  Clinical lab tests: reviewed  Tests in the radiology section of CPT®: reviewed  Tests in the medicine section of CPT®: reviewed        Final diagnoses:   Chest pain, unspecified type   Dyspnea, unspecified type   Exposure to SARS-associated coronavirus            Evita Hoover PA  05/08/20 1146

## 2020-05-09 ENCOUNTER — READMISSION MANAGEMENT (OUTPATIENT)
Dept: CALL CENTER | Facility: HOSPITAL | Age: 45
End: 2020-05-09

## 2020-05-09 ENCOUNTER — APPOINTMENT (OUTPATIENT)
Dept: CARDIOLOGY | Facility: HOSPITAL | Age: 45
End: 2020-05-09

## 2020-05-09 VITALS
TEMPERATURE: 98.6 F | HEIGHT: 60 IN | WEIGHT: 146 LBS | HEART RATE: 78 BPM | BODY MASS INDEX: 28.66 KG/M2 | RESPIRATION RATE: 15 BRPM | DIASTOLIC BLOOD PRESSURE: 67 MMHG | SYSTOLIC BLOOD PRESSURE: 102 MMHG | OXYGEN SATURATION: 93 %

## 2020-05-09 LAB
ANION GAP SERPL CALCULATED.3IONS-SCNC: 13 MMOL/L (ref 5–15)
BASOPHILS # BLD AUTO: 0.1 10*3/MM3 (ref 0–0.2)
BASOPHILS NFR BLD AUTO: 0.6 % (ref 0–1.5)
BUN BLD-MCNC: 14 MG/DL (ref 6–20)
BUN/CREAT SERPL: 19.2 (ref 7–25)
CALCIUM SPEC-SCNC: 9.9 MG/DL (ref 8.6–10.5)
CHLORIDE SERPL-SCNC: 104 MMOL/L (ref 98–107)
CO2 SERPL-SCNC: 26 MMOL/L (ref 22–29)
CREAT BLD-MCNC: 0.73 MG/DL (ref 0.57–1)
DEPRECATED RDW RBC AUTO: 41.1 FL (ref 37–54)
EOSINOPHIL # BLD AUTO: 0.6 10*3/MM3 (ref 0–0.4)
EOSINOPHIL NFR BLD AUTO: 5.7 % (ref 0.3–6.2)
ERYTHROCYTE [DISTWIDTH] IN BLOOD BY AUTOMATED COUNT: 12.7 % (ref 12.3–15.4)
GFR SERPL CREATININE-BSD FRML MDRD: 86 ML/MIN/1.73
GLUCOSE BLD-MCNC: 112 MG/DL (ref 65–99)
HCT VFR BLD AUTO: 39.6 % (ref 34–46.6)
HGB BLD-MCNC: 14.3 G/DL (ref 12–15.9)
LYMPHOCYTES # BLD AUTO: 4.7 10*3/MM3 (ref 0.7–3.1)
LYMPHOCYTES NFR BLD AUTO: 46.4 % (ref 19.6–45.3)
MAGNESIUM SERPL-MCNC: 1.7 MG/DL (ref 1.6–2.6)
MCH RBC QN AUTO: 32.9 PG (ref 26.6–33)
MCHC RBC AUTO-ENTMCNC: 36.2 G/DL (ref 31.5–35.7)
MCV RBC AUTO: 90.7 FL (ref 79–97)
MONOCYTES # BLD AUTO: 0.6 10*3/MM3 (ref 0.1–0.9)
MONOCYTES NFR BLD AUTO: 6 % (ref 5–12)
NEUTROPHILS # BLD AUTO: 4.2 10*3/MM3 (ref 1.7–7)
NEUTROPHILS NFR BLD AUTO: 41.3 % (ref 42.7–76)
NRBC BLD AUTO-RTO: 0.1 /100 WBC (ref 0–0.2)
PLATELET # BLD AUTO: 295 10*3/MM3 (ref 140–450)
PMV BLD AUTO: 8.8 FL (ref 6–12)
POTASSIUM BLD-SCNC: 3.7 MMOL/L (ref 3.5–5.2)
RBC # BLD AUTO: 4.36 10*6/MM3 (ref 3.77–5.28)
SODIUM BLD-SCNC: 143 MMOL/L (ref 136–145)
TROPONIN T SERPL-MCNC: <0.01 NG/ML (ref 0–0.03)
WBC NRBC COR # BLD: 10.1 10*3/MM3 (ref 3.4–10.8)

## 2020-05-09 PROCEDURE — G0378 HOSPITAL OBSERVATION PER HR: HCPCS

## 2020-05-09 PROCEDURE — 93017 CV STRESS TEST TRACING ONLY: CPT

## 2020-05-09 PROCEDURE — 80048 BASIC METABOLIC PNL TOTAL CA: CPT | Performed by: INTERNAL MEDICINE

## 2020-05-09 PROCEDURE — 84484 ASSAY OF TROPONIN QUANT: CPT | Performed by: NURSE PRACTITIONER

## 2020-05-09 PROCEDURE — 93016 CV STRESS TEST SUPVJ ONLY: CPT | Performed by: INTERNAL MEDICINE

## 2020-05-09 PROCEDURE — 83735 ASSAY OF MAGNESIUM: CPT | Performed by: NURSE PRACTITIONER

## 2020-05-09 PROCEDURE — 25010000002 MAGNESIUM SULFATE IN D5W 1G/100ML (PREMIX) 1-5 GM/100ML-% SOLUTION: Performed by: NURSE PRACTITIONER

## 2020-05-09 PROCEDURE — 85025 COMPLETE CBC W/AUTO DIFF WBC: CPT | Performed by: INTERNAL MEDICINE

## 2020-05-09 PROCEDURE — 99217 PR OBSERVATION CARE DISCHARGE MANAGEMENT: CPT | Performed by: INTERNAL MEDICINE

## 2020-05-09 RX ORDER — AMLODIPINE BESYLATE 5 MG/1
5 TABLET ORAL
Qty: 30 TABLET | Refills: 0 | Status: SHIPPED | OUTPATIENT
Start: 2020-05-10 | End: 2021-11-10

## 2020-05-09 RX ORDER — NITROGLYCERIN 0.4 MG/1
0.4 TABLET SUBLINGUAL
Qty: 25 TABLET | Refills: 0 | Status: SHIPPED | OUTPATIENT
Start: 2020-05-09 | End: 2021-11-10

## 2020-05-09 RX ORDER — PANTOPRAZOLE SODIUM 40 MG/1
40 TABLET, DELAYED RELEASE ORAL EVERY MORNING
Status: DISCONTINUED | OUTPATIENT
Start: 2020-05-09 | End: 2020-05-09 | Stop reason: HOSPADM

## 2020-05-09 RX ADMIN — Medication 1 TABLET: at 10:24

## 2020-05-09 RX ADMIN — MAGNESIUM SULFATE HEPTAHYDRATE 1 G: 1 INJECTION, SOLUTION INTRAVENOUS at 10:24

## 2020-05-09 RX ADMIN — Medication 10 ML: at 10:25

## 2020-05-09 RX ADMIN — PANTOPRAZOLE SODIUM 40 MG: 40 TABLET, DELAYED RELEASE ORAL at 10:44

## 2020-05-09 RX ADMIN — ASPIRIN 325 MG: 325 TABLET, COATED ORAL at 10:26

## 2020-05-09 RX ADMIN — SERTRALINE 100 MG: 100 TABLET, FILM COATED ORAL at 10:25

## 2020-05-09 RX ADMIN — AMLODIPINE BESYLATE 5 MG: 5 TABLET ORAL at 10:25

## 2020-05-09 NOTE — DISCHARGE SUMMARY
[facility] Medicine Services  DISCHARGE SUMMARY        Prepared For PCP:  Kathia Cooper MD    Patient Name: Evelyn Garcia  : 1975  MRN: [id number]      Date of Admission:   2020    Date of Discharge:  2020    Length of stay:  LOS: 0 days     Hospital Course     Presenting Problem:   Exposure to SARS-associated coronavirus [Z20.828]  Dyspnea, unspecified type [R06.00]  Chest pain, unspecified type [R07.9]  Chest pain [R07.9]      Active Hospital Problems    Diagnosis  POA   • **Chest pain [R07.9]  Yes     Priority: Medium   • Gastroesophageal reflux disease [K21.9]  Yes     Priority: High   • Shortness of breath [R06.02]  Yes     Priority: Medium   • HTN (hypertension) [I10]  Yes     Priority: Medium   • Anxiety disorder [F41.9]  Yes     Priority: Medium   • Hyperlipidemia [E78.5]  Yes     Priority: Medium      Resolved Hospital Problems   No resolved problems to display.           Hospital Course:  Evelyn Garcia is a 45 y.o. female who presented with chest pain.  The patient reported that the pain was a pressure type of sensation and that it went up into her neck.  She does report that she has had a lot more problems with her anxiety during this COVID time as she is off work.  The patient reported on the day of discharge that she was feeling much better overall.  She was not having the chest pain any longer.  She does have a history of gastroesophageal reflux disease.  The patient overall was doing well.  We had a verbal report that the patient's stress test (per cardiologist) was negative and that she could be discharged.  The patient was feeling better and therefore she will be discharged.    The patient is a full code at the time of discharge.  The patient's only pending study is the final report of the stress test.  The patient's medications are as listed in that section of this report.  The patient is on a cardiac diet.  The patient should see her primary care  provider in 3 to 5 days.  The patient may resume her activities as she is able to do so.  The patient is discharged in satisfactory condition.          Recommendation for Outpatient Providers:   Follow-up in 3 to 5 days with primary care provider.  That time hopefully the final report for the stress test will be available to her PCP.    Reasons For Change In Medications and Indications for New Medications:    Day of Discharge     HPI:   Patient is chest pain has resolved.  She does not have any nausea, vomiting or other associated symptoms.  She feels back to baseline.    Vital Signs:   Temp:  [97.6 °F (36.4 °C)-98.6 °F (37 °C)] 98.6 °F (37 °C)  Heart Rate:  [78-91] 78  Resp:  [14-16] 15  BP: (102-121)/(67-87) 102/67     Physical Exam:  Physical Exam   Constitutional: She is oriented to person, place, and time. She appears well-developed and well-nourished. No distress.   HENT:   Head: Normocephalic and atraumatic.   Right Ear: External ear normal.   Left Ear: External ear normal.   Nose: Nose normal.   Mouth/Throat: Oropharynx is clear and moist. No oropharyngeal exudate.   Eyes: Pupils are equal, round, and reactive to light. Conjunctivae and EOM are normal. Right eye exhibits no discharge. Left eye exhibits no discharge. No scleral icterus.   Neck: Normal range of motion. No JVD present. No tracheal deviation present. No thyromegaly present.   Cardiovascular: Normal rate, regular rhythm, normal heart sounds and intact distal pulses. Exam reveals no gallop and no friction rub.   No murmur heard.  Pulmonary/Chest: Effort normal and breath sounds normal. No stridor. No respiratory distress. She has no wheezes. She has no rales. She exhibits no tenderness.   Abdominal: Soft. Bowel sounds are normal. She exhibits no distension and no mass. There is no tenderness. There is no rebound and no guarding. No hernia.   Musculoskeletal: Normal range of motion. She exhibits no edema, tenderness or deformity.   Lymphadenopathy:      She has no cervical adenopathy.   Neurological: She is alert and oriented to person, place, and time. No cranial nerve deficit or sensory deficit. She exhibits normal muscle tone. Coordination normal.   Skin: Skin is warm and dry. No rash noted. She is not diaphoretic. No erythema.   Psychiatric: She has a normal mood and affect. Her behavior is normal.   Nursing note and vitals reviewed.      Pertinent  and/or Most Recent Results     Results from last 7 days   Lab Units 05/09/20  0109 05/08/20 0959   WBC 10*3/mm3 10.10 10.50   HEMOGLOBIN g/dL 14.3 15.4   HEMATOCRIT % 39.6 44.3   PLATELETS 10*3/mm3 295 316   SODIUM mmol/L 143 142   POTASSIUM mmol/L 3.7 3.5   CHLORIDE mmol/L 104 102   CO2 mmol/L 26.0 26.0   BUN mg/dL 14 11   CREATININE mg/dL 0.73 0.71   GLUCOSE mg/dL 112* 117*   CALCIUM mg/dL 9.9 10.2     Results from last 7 days   Lab Units 05/08/20  0959   BILIRUBIN mg/dL 0.5   ALK PHOS U/L 33*   ALT (SGPT) U/L 18   AST (SGOT) U/L 17           Invalid input(s): TG, LDLCALC, LDLREALC  Results from last 7 days   Lab Units 05/09/20  0109 05/08/20  1913 05/08/20  0959   TSH uIU/mL  --   --  2.110   PROBNP pg/mL  --   --  33.8   TROPONIN T ng/mL <0.010 <0.010 <0.010   PROCALCITONIN ng/mL  --   --  0.03*     Brief Urine Lab Results     None        Microbiology Results Abnormal     Procedure Component Value - Date/Time    SARS-CoV-2 PCR ([place] IN-HOUSE PERFORMED), NP SWAB IN TRANSPORT MEDIA - Swab, Nasopharynx [[id number]]  (Normal) Collected:  05/08/20 0959    Lab Status:  Final result Specimen:  Swab from Nasopharynx Updated:  05/08/20 1813     COVID19 Not Detected    Respiratory Panel, PCR - Swab, Nasopharynx [[id number]]  (Normal) Collected:  05/08/20 0959    Lab Status:  Final result Specimen:  Swab from Nasopharynx Updated:  05/08/20 1145     ADENOVIRUS, PCR Not Detected     Coronavirus 229E Not Detected     Coronavirus HKU1 Not Detected     Coronavirus NL63 Not Detected     Coronavirus OC43 Not Detected      Human Metapneumovirus Not Detected     Human Rhinovirus/Enterovirus Not Detected     Influenza B PCR Not Detected     Parainfluenza Virus 1 Not Detected     Parainfluenza Virus 2 Not Detected     Parainfluenza Virus 3 Not Detected     Parainfluenza Virus 4 Not Detected     Bordetella pertussis pcr Not Detected     Influenza A H1 2009 PCR Not Detected     Chlamydophila pneumoniae PCR Not Detected     Mycoplasma pneumo by PCR Not Detected     Influenza A PCR Not Detected     Influenza A H3 Not Detected     Influenza A H1 Not Detected     RSV, PCR Not Detected    Narrative:       The coronavirus on the RVP is NOT COVID-19 and is NOT indicative of infection with COVID-19.         Xr Chest Ap    Result Date: 5/8/2020  Impression: No acute cardiopulmonary abnormality.  Electronically Signed By-Jessica Giang MD On:5/8/2020 10:33 AM This report was finalized on [id number] by  Jessica Giang MD.             Test Results Pending at Discharge  Final reading of the patient's stress test is pending at discharge.  Dr. Mota did call the floor to report that the patient could be discharged that the test was negative but the final report is pending.      Procedures Performed   Consults:   Consults     Date and Time Order Name Status Description    5/8/2020 1130 Hospitalist (on-call MD unless specified) Completed             Discharge Details        Discharge Medications      New Medications      Instructions Start Date   amLODIPine 5 MG tablet  Commonly known as:  NORVASC   5 mg, Oral, Every 24 Hours Scheduled   Start Date:  May 10, 2020     aspirin 325 MG EC tablet   325 mg, Oral, Daily   Start Date:  May 10, 2020     nitroglycerin 0.4 MG SL tablet  Commonly known as:  NITROSTAT   0.4 mg, Sublingual, Every 5 Minutes PRN, Take no more than 3 doses in 15 minutes.         Continue These Medications      Instructions Start Date   atorvastatin 40 MG tablet  Commonly known as:  LIPITOR   40 mg, Oral, Nightly      MULTIVITAMIN ADULT PO   1  tablet, Oral, Daily      Prevacid 15 MG capsule  Generic drug:  lansoprazole   15 mg, Oral, Daily      Zoloft 100 MG tablet  Generic drug:  sertraline   100 mg, Oral, Daily           Allergies   Allergen Reactions   • Butorphanol Unknown - High Severity     Slows breathing-unresponsive     • Hydromorphone Hcl Unknown - High Severity     Slows breathing -unresponsive   • Nalbuphine Unknown - High Severity     Discharge Disposition:  Home or Self Care    Diet:  Hospital:  Diet Order   Procedures   • Diet Regular     Discharge Activity:   Patient may resume previous activity level    CODE STATUS:    Code Status and Medical Interventions:   Ordered at: 05/08/20 1247     Level Of Support Discussed With:    Patient     Code Status:    CPR     Medical Interventions (Level of Support Prior to Arrest):    Full   Follow-up Appointments  No future appointments.  Condition on Discharge:    Stable      This patient has been examined wearing appropriate Personal Protective Equipment and discussed with hospital infection control department. 05/09/20    Electronically signed by Beth Wynn MD, 05/09/20, 12:30 PM.    Time: I spent  25  minutes on this discharge activity which included face-to-face encounter with the patient/reviewing the data in the system/coordination of the care with the nursing staff as well as consultants/documentation/entering orders.

## 2020-05-09 NOTE — PLAN OF CARE
Problem: Patient Care Overview  Goal: Plan of Care Review  Outcome: Ongoing (interventions implemented as appropriate)  Flowsheets  Taken 5/9/2020 0442 by Marissa Rizzo, RN  Progress: no change  Outcome Summary: Pt still having some chest pain without radiation. Pt is NPO for stress test this AM.  Taken 5/8/2020 1924 by Xochitl Kurtz, RN  Plan of Care Reviewed With: patient

## 2020-05-09 NOTE — PLAN OF CARE
Cardiology reports stress test normal. Patient denies complaints. Reports she is ready to go home.

## 2020-05-10 ENCOUNTER — READMISSION MANAGEMENT (OUTPATIENT)
Dept: CALL CENTER | Facility: HOSPITAL | Age: 45
End: 2020-05-10

## 2020-05-10 LAB
BH CV STRESS BP STAGE 1: NORMAL
BH CV STRESS BP STAGE 2: NORMAL
BH CV STRESS BP STAGE 3: NORMAL
BH CV STRESS DURATION MIN STAGE 1: 3
BH CV STRESS DURATION MIN STAGE 2: 3
BH CV STRESS DURATION MIN STAGE 3: 3
BH CV STRESS DURATION SEC STAGE 1: 0
BH CV STRESS DURATION SEC STAGE 2: 0
BH CV STRESS DURATION SEC STAGE 3: 0
BH CV STRESS GRADE STAGE 1: 10
BH CV STRESS GRADE STAGE 2: 12
BH CV STRESS GRADE STAGE 3: 14
BH CV STRESS HR STAGE 1: 117
BH CV STRESS HR STAGE 2: 129
BH CV STRESS HR STAGE 3: 155
BH CV STRESS METS STAGE 1: 5
BH CV STRESS METS STAGE 2: 7.5
BH CV STRESS METS STAGE 3: 10
BH CV STRESS PROTOCOL 1: NORMAL
BH CV STRESS RECOVERY BP: NORMAL MMHG
BH CV STRESS RECOVERY HR: 103 BPM
BH CV STRESS SPEED STAGE 1: 1.7
BH CV STRESS SPEED STAGE 2: 2.5
BH CV STRESS SPEED STAGE 3: 3.4
BH CV STRESS STAGE 1: 1
BH CV STRESS STAGE 2: 2
BH CV STRESS STAGE 3: 3
MAXIMAL PREDICTED HEART RATE: 174 BPM
PERCENT MAX PREDICTED HR: 89.08 %
STRESS BASELINE BP: NORMAL MMHG
STRESS BASELINE HR: 95 BPM
STRESS PERCENT HR: 105 %
STRESS POST ESTIMATED WORKLOAD: 10.1 METS
STRESS POST EXERCISE DUR MIN: 9 MIN
STRESS POST EXERCISE DUR SEC: 0 SEC
STRESS POST PEAK BP: NORMAL MMHG
STRESS POST PEAK HR: 155 BPM
STRESS TARGET HR: 148 BPM

## 2020-05-10 PROCEDURE — 93018 CV STRESS TEST I&R ONLY: CPT | Performed by: INTERNAL MEDICINE

## 2020-05-10 NOTE — OUTREACH NOTE
Medical Week 1 Survey      Responses   [facility] patient discharged from?  [facility]   COVID-19 Test Status  Negative   Does the patient have one of the following disease processes/diagnoses(primary or secondary)?  Other   Is there a successful TCM telephone encounter documented?  No   Week 1 attempt successful?  Yes   Call start time  1128   Call end time  1133   Discharge diagnosis  Dyspnea/chest pain   Is patient permission given to speak with other caregiver?  Yes   List who call center can speak with     Meds reviewed with patient/caregiver?  Yes   Is the patient having any side effects they believe may be caused by any medication additions or changes?  No   Does the patient have all medications ordered at discharge?  Yes   Is the patient taking all medications as directed (includes completed medication regime)?  Yes   Does the patient have a primary care provider?   Yes   Does the patient have an appointment with their PCP within 7 days of discharge?  No   What is preventing the patient from scheduling follow up appointments within 7 days of discharge?  Haven't had time   Nursing Interventions  Advised patient to make appointment   Has the patient kept scheduled appointments due by today?  N/A   Pulse Ox monitoring  None   Psychosocial issues?  No   Did the patient receive a copy of their discharge instructions?  Yes   Nursing interventions  Reviewed instructions with patient   What is the patient's perception of their health status since discharge?  Improving   Is the patient/caregiver able to teach back signs and symptoms related to disease process for when to call PCP?  Yes   Is the patient/caregiver able to teach back signs and symptoms related to disease process for when to call 911?  Yes   Is the patient/caregiver able to teach back the hierarchy of who to call/visit for symptoms/problems? PCP, Specialist, Home health nurse, Urgent Care, ED, 911  Yes   Week 1 call completed?  Yes           Verena Tipton RN

## 2020-05-11 ENCOUNTER — TRANSITIONAL CARE MANAGEMENT TELEPHONE ENCOUNTER (OUTPATIENT)
Dept: CALL CENTER | Facility: HOSPITAL | Age: 45
End: 2020-05-11

## 2020-05-11 NOTE — OUTREACH NOTE
Call Center TCM Note      Responses   [facility] patient discharged from?  [facility]   COVID-19 Test Status  Negative   Does the patient have one of the following disease processes/diagnoses(primary or secondary)?  Other   TCM attempt successful?  Yes   Call start time  1248   Call end time  1256   Discharge diagnosis  Dyspnea/chest pain   Is patient permission given to speak with other caregiver?  Yes   Meds reviewed with patient/caregiver?  Yes   Is the patient having any side effects they believe may be caused by any medication additions or changes?  No   Does the patient have all medications ordered at discharge?  Yes   Is the patient taking all medications as directed (includes completed medication regime)?  Yes   Does the patient have a primary care provider?   Yes   Does the patient have an appointment with their PCP within 7 days of discharge?  No   What is preventing the patient from scheduling follow up appointments within 7 days of discharge?  Haven't had time   Nursing Interventions  Educated patient on importance of making appointment, Advised patient to make appointment   Has home health visited the patient within 72 hours of discharge?  N/A   Did the patient receive a copy of their discharge instructions?  Yes   Nursing interventions  Reviewed instructions with patient   What is the patient's perception of their health status since discharge?  Improving   Is the patient/caregiver able to teach back signs and symptoms related to disease process for when to call PCP?  Yes   Is the patient/caregiver able to teach back signs and symptoms related to disease process for when to call 911?  Yes   Is the patient/caregiver able to teach back the hierarchy of who to call/visit for symptoms/problems? PCP, Specialist, Home health nurse, Urgent Care, ED, 911  Yes   TCM call completed?  Yes   Wrap up additional comments  Reports feeling ok, said she did have some pain across shoulders again last night, entire  episode lasted about 30 min, severe pain about 1 min. Did not take any nitro.           Lore Yadav RN    5/11/2020, 12:57

## 2020-05-12 ENCOUNTER — READMISSION MANAGEMENT (OUTPATIENT)
Dept: CALL CENTER | Facility: HOSPITAL | Age: 45
End: 2020-05-12

## 2020-05-12 NOTE — OUTREACH NOTE
Medical Week 1 Survey      Responses   [facility] patient discharged from?  [facility]   COVID-19 Test Status  Negative   Does the patient have one of the following disease processes/diagnoses(primary or secondary)?  Other   Is there a successful TCM telephone encounter documented?  No   Week 1 attempt successful?  Yes   Call start time  1429   Call end time  1432   Meds reviewed with patient/caregiver?  Yes   Is the patient having any side effects they believe may be caused by any medication additions or changes?  No   Does the patient have all medications ordered at discharge?  Yes   Is the patient taking all medications as directed (includes completed medication regime)?  Yes   Does the patient have a primary care provider?   Yes   Does the patient have an appointment with their PCP within 7 days of discharge?  No   Comments regarding PCP  PATIENT STATES SHE WILL CALL TODAY TO SEE IF SHE CAN DO A TELEHEALTH APPOINTMENT WITH DR. TONY   What is preventing the patient from scheduling follow up appointments within 7 days of discharge?  Haven't had time   Nursing Interventions  Educated patient on importance of making appointment, Advised patient to make appointment   Has the patient kept scheduled appointments due by today?  N/A   Has home health visited the patient within 72 hours of discharge?  N/A   Pulse Ox monitoring  None   Did the patient receive a copy of their discharge instructions?  Yes   Nursing interventions  Reviewed instructions with patient   What is the patient's perception of their health status since discharge?  Improving   Is the patient/caregiver able to teach back signs and symptoms related to disease process for when to call PCP?  Yes   Is the patient/caregiver able to teach back signs and symptoms related to disease process for when to call 911?  Yes   Is the patient/caregiver able to teach back the hierarchy of who to call/visit for symptoms/problems? PCP, Specialist, Home health nurse,  Urgent Care, ED, 911  Yes   Week 1 call completed?  Yes   Wrap up additional comments  PATIENT STATES HER CHEST PAIN HAS RESOLVED. SHE JUST FEELS SOMEWHAT RUN DOWN.           Brisa Dewey LPN

## 2020-05-15 ENCOUNTER — READMISSION MANAGEMENT (OUTPATIENT)
Dept: CALL CENTER | Facility: HOSPITAL | Age: 45
End: 2020-05-15

## 2020-05-15 NOTE — OUTREACH NOTE
Medical Week 1 Survey      Responses   [facility] patient discharged from?  [facility]   COVID-19 Test Status  Negative   Does the patient have one of the following disease processes/diagnoses(primary or secondary)?  Other   Is there a successful TCM telephone encounter documented?  No   Week 1 attempt successful?  Yes   Call start time  1133   Call end time  1135   Discharge diagnosis  Dyspnea/chest pain   Is patient permission given to speak with other caregiver?  Yes   List who call center can speak with  Oscar Mora reviewed with patient/caregiver?  Yes   Is the patient having any side effects they believe may be caused by any medication additions or changes?  No   Does the patient have all medications ordered at discharge?  Yes   Is the patient taking all medications as directed (includes completed medication regime)?  Yes   Does the patient have a primary care provider?   Yes   Has the patient kept scheduled appointments due by today?  N/A   Pulse Ox monitoring  None   Psychosocial issues?  No   Did the patient receive a copy of their discharge instructions?  Yes   Nursing interventions  Reviewed instructions with patient   What is the patient's perception of their health status since discharge?  Improving   Is the patient/caregiver able to teach back signs and symptoms related to disease process for when to call PCP?  Yes   Is the patient/caregiver able to teach back signs and symptoms related to disease process for when to call 911?  Yes   Is the patient/caregiver able to teach back the hierarchy of who to call/visit for symptoms/problems? PCP, Specialist, Home health nurse, Urgent Care, ED, 911  Yes   Week 1 call completed?  Yes   Wrap up additional comments  She had a rough night last night.           Catia Lemus RN   No

## 2020-05-18 ENCOUNTER — READMISSION MANAGEMENT (OUTPATIENT)
Dept: CALL CENTER | Facility: HOSPITAL | Age: 45
End: 2020-05-18

## 2020-05-18 RX ORDER — ATORVASTATIN CALCIUM 40 MG/1
TABLET, FILM COATED ORAL
Qty: 90 TABLET | Refills: 0 | Status: SHIPPED | OUTPATIENT
Start: 2020-05-18 | End: 2020-09-14

## 2020-05-18 RX ORDER — SERTRALINE HYDROCHLORIDE 100 MG/1
TABLET, FILM COATED ORAL
Qty: 60 TABLET | Refills: 0 | Status: SHIPPED | OUTPATIENT
Start: 2020-05-18 | End: 2020-09-14

## 2020-05-18 NOTE — OUTREACH NOTE
Medical Week 2 Survey      Responses   [facility] patient discharged from?  [facility]   COVID-19 Test Status  Negative   Does the patient have one of the following disease processes/diagnoses(primary or secondary)?  Other   Week 2 attempt successful?  Yes   Call start time  0959   Discharge diagnosis  Dyspnea/chest pain   Call end time  1000   Meds reviewed with patient/caregiver?  Yes   Is the patient having any side effects they believe may be caused by any medication additions or changes?  No   Does the patient have all medications ordered at discharge?  Yes   Is the patient taking all medications as directed (includes completed medication regime)?  Yes   Does the patient have a primary care provider?   Yes   Does the patient have an appointment with their PCP within 7 days of discharge?  No   What is preventing the patient from scheduling follow up appointments within 7 days of discharge?  Haven't had time   Nursing Interventions  Educated patient on importance of making appointment, Advised patient to make appointment   Has the patient kept scheduled appointments due by today?  N/A   Has home health visited the patient within 72 hours of discharge?  N/A   Pulse Ox monitoring  None   Psychosocial issues?  No   Did the patient receive a copy of their discharge instructions?  Yes   Nursing interventions  Reviewed instructions with patient   What is the patient's perception of their health status since discharge?  Improving   Is the patient/caregiver able to teach back signs and symptoms related to disease process for when to call PCP?  Yes   Is the patient/caregiver able to teach back signs and symptoms related to disease process for when to call 911?  Yes   Is the patient/caregiver able to teach back the hierarchy of who to call/visit for symptoms/problems? PCP, Specialist, Home health nurse, Urgent Care, ED, 911  Yes   Week 2 Call Completed?  Yes          Donnell Castillo RN

## 2020-07-01 ENCOUNTER — LAB REQUISITION (OUTPATIENT)
Dept: LAB | Facility: HOSPITAL | Age: 45
End: 2020-07-01

## 2020-07-01 DIAGNOSIS — Z00.00 ENCOUNTER FOR GENERAL ADULT MEDICAL EXAMINATION WITHOUT ABNORMAL FINDINGS: ICD-10-CM

## 2020-07-01 PROCEDURE — 86762 RUBELLA ANTIBODY: CPT | Performed by: PHYSICAL MEDICINE & REHABILITATION

## 2020-07-01 PROCEDURE — 86735 MUMPS ANTIBODY: CPT | Performed by: PHYSICAL MEDICINE & REHABILITATION

## 2020-07-01 PROCEDURE — 86765 RUBEOLA ANTIBODY: CPT | Performed by: PHYSICAL MEDICINE & REHABILITATION

## 2020-07-01 PROCEDURE — 86706 HEP B SURFACE ANTIBODY: CPT | Performed by: PHYSICAL MEDICINE & REHABILITATION

## 2020-07-01 PROCEDURE — 86787 VARICELLA-ZOSTER ANTIBODY: CPT | Performed by: PHYSICAL MEDICINE & REHABILITATION

## 2020-07-02 LAB — HBV SURFACE AB SER RIA-ACNC: NORMAL

## 2020-07-03 LAB
MEV IGG SER IA-ACNC: NORMAL
MUV IGG SER IA-ACNC: NORMAL
RUBV IGG SERPL IA-ACNC: NORMAL
VZV IGG SER IA-ACNC: NORMAL

## 2020-09-14 RX ORDER — ATORVASTATIN CALCIUM 40 MG/1
TABLET, FILM COATED ORAL
Qty: 90 TABLET | Refills: 2 | Status: SHIPPED | OUTPATIENT
Start: 2020-09-14 | End: 2021-07-29

## 2020-09-14 RX ORDER — SERTRALINE HYDROCHLORIDE 100 MG/1
TABLET, FILM COATED ORAL
Qty: 180 TABLET | Refills: 2 | Status: SHIPPED | OUTPATIENT
Start: 2020-09-14 | End: 2021-08-20

## 2020-11-17 ENCOUNTER — CLINICAL SUPPORT (OUTPATIENT)
Dept: FAMILY MEDICINE CLINIC | Facility: CLINIC | Age: 45
End: 2020-11-17

## 2020-11-17 VITALS — TEMPERATURE: 97.3 F

## 2020-11-17 DIAGNOSIS — Z23 NEED FOR VACCINATION: Primary | ICD-10-CM

## 2020-11-17 PROCEDURE — 90471 IMMUNIZATION ADMIN: CPT | Performed by: FAMILY MEDICINE

## 2020-11-17 PROCEDURE — 90686 IIV4 VACC NO PRSV 0.5 ML IM: CPT | Performed by: FAMILY MEDICINE

## 2020-12-01 ENCOUNTER — E-VISIT (OUTPATIENT)
Dept: FAMILY MEDICINE CLINIC | Facility: CLINIC | Age: 45
End: 2020-12-01

## 2020-12-01 DIAGNOSIS — H66.90 ACUTE OTITIS MEDIA, UNSPECIFIED OTITIS MEDIA TYPE: Primary | ICD-10-CM

## 2020-12-01 PROCEDURE — 99421 OL DIG E/M SVC 5-10 MIN: CPT | Performed by: FAMILY MEDICINE

## 2020-12-01 RX ORDER — AMOXICILLIN 500 MG/1
500 CAPSULE ORAL 3 TIMES DAILY
Qty: 30 CAPSULE | Refills: 0 | Status: SHIPPED | OUTPATIENT
Start: 2020-12-01 | End: 2021-01-20

## 2020-12-01 NOTE — PROGRESS NOTES
Subjective   Evelyn Garcia is a 45 y.o. female.     History of Present Illness see questionnaire    The following portions of the patient's history were reviewed and updated as appropriate: allergies, current medications, past family history, past medical history, past social history, past surgical history and problem list.    Review of Systems    Objective   Physical Exam      Assessment/Plan   Diagnoses and all orders for this visit:    1. Acute otitis media, unspecified otitis media type (Primary)  -     amoxicillin (AMOXIL) 500 MG capsule; Take 1 capsule by mouth 3 (Three) Times a Day.  Dispense: 30 capsule; Refill: 0

## 2021-01-20 ENCOUNTER — TELEMEDICINE (OUTPATIENT)
Dept: FAMILY MEDICINE CLINIC | Facility: TELEHEALTH | Age: 46
End: 2021-01-20

## 2021-01-20 DIAGNOSIS — Z20.822 ENCOUNTER BY TELEHEALTH FOR SUSPECTED COVID-19: Primary | ICD-10-CM

## 2021-01-20 PROCEDURE — 99213 OFFICE O/P EST LOW 20 MIN: CPT | Performed by: NURSE PRACTITIONER

## 2021-01-20 NOTE — PATIENT INSTRUCTIONS
Go to the nearest [facility] for your Covid-19 test. Wear a mask, call when you arrive, you will receive further instructions from the urgent care staff. You will be notified in 1-5 days about the results of your test, by telephone call from a blocked cell number, and via [employer].  Please quarantine for 10 days from the start of your symptoms. You can discontinue your quarantine once your symptoms have improved and you have been fever-free for 24 hours without the use of fever-reducing medications.      How to Quarantine at Home  Information for Patients and Families    These instructions are for people with confirmed or suspected COVID-19 who do not need to be hospitalized and those with confirmed COVID-19 who were hospitalized and discharged to care for themselves at home.    If you were tested through the Health Department  The Health Department will monitor your wellbeing.  If it is determined that you do not need to be hospitalized and can be isolated at home, you will be monitored by staff from your local or state health department.     If you were tested through a [facility]  You will need to monitor yourself and report changes in your symptoms to your doctor.  See the section below called Monitor Your Symptoms.    Follow these steps until a healthcare provider or local or state health department says you can return to your normal activities.    Stay home except to get medical care  • Restrict activities outside your home, except for getting medical care.   • Do not go to work, school, or public areas.   • Avoid using public transportation, ride-sharing, or taxis.    Separate yourself from other people and animals in your home  People  As much as possible, you should stay in a specific room and away from other people in your home. Also, you should use a separate bathroom, if available.    Animals  You should restrict contact with pets and other animals while you are sick with COVID-19, just like  you would around other people. When possible, have another member of your household care for your animals while you are sick. If you are sick with COVID-19, avoid contact with your pet, including petting, snuggling, being kissed or licked, and sharing food. If you must care for your pet or be around animals while you are sick, wash your hands before and after you interact with pets and wear a facemask. See COVID-19 and Animals for more information.    Call ahead before visiting your doctor  If you have a medical appointment, call the healthcare provider and tell them that you have or may have COVID-19. This information will help the healthcare provider’s office take steps to keep other people from getting infected or exposed.    Wear a facemask  You should wear a facemask when you are around other people (e.g., sharing a room or vehicle) or pets and before you enter a healthcare provider’s office.     If you are not able to wear a facemask (for example, because it causes trouble breathing), then people who live with you should not stay in the same room with you, or they should wear a facemask if they enter your room.    Cover your coughs and sneezes  • Cover your mouth and nose with a tissue when you cough or sneeze.   • Throw used tissues in a lined trash can.   • Immediately wash your hands with soap and water for at least 20 seconds or, if soap and water are not available, clean your hands with an alcohol-based hand  that contains at least 60% alcohol.    Clean your hands often  • Wash your hands often with soap and water for at least 20 seconds, especially after blowing your nose, coughing, or sneezing; going to the bathroom; and before eating or preparing food.     • If soap and water are not readily available, use an alcohol-based hand  with at least 60% alcohol, covering all surfaces of your hands and rubbing them together until they feel dry.    • Soap and water are the best option if hands  are visibly dirty. Avoid touching your eyes, nose, and mouth with unwashed hands.    Avoid sharing personal household items  • You should not share dishes, drinking glasses, cups, eating utensils, towels, or bedding with other people or pets in your home.   • After using these items, they should be washed thoroughly with soap and water.    Clean all “high-touch” surfaces everyday  • High touch surfaces include counters, tabletops, doorknobs, bathroom fixtures, toilets, phones, keyboards, tablets, and bedside tables.   • Also, clean any surfaces that may have blood, stool, or body fluids on them.   • Use a household cleaning spray or wipe, according to the label instructions. Labels contain instructions for safe and effective use of the cleaning product, including precautions you should take when applying the product, such as wearing gloves and making sure you have good ventilation during use of the product.    Monitor your symptoms  • Seek prompt medical attention if your illness is worsening (e.g., difficulty breathing).   • Before seeking care, call your healthcare provider and tell them that you have, or are being evaluated for, COVID-19.   • Put on a facemask before you enter the facility.     • These steps will help the healthcare provider’s office to keep other people in the office or waiting room from getting infected or exposed.   • Persons who are placed under active monitoring or facilitated self-monitoring should follow instructions provided by their local health department or occupational health professionals, as appropriate.  • If you have a medical emergency and need to call 911, notify the dispatch personnel that you have, or are being evaluated for COVID-19. If possible, put on a facemask before emergency medical services arrive.    Discontinuing home isolation  Patients with confirmed COVID-19 should remain under home isolation precautions until the risk of secondary transmission to others is  thought to be low. The decision to discontinue home isolation precautions should be made on a case-by-case basis, in consultation with healthcare providers and state and local health departments.    The below content are for household members, intimate partners, and caregivers of a patient with symptomatic laboratory-confirmed COVID-19 or a patient under investigation:    Household members, intimate partners, and caregivers may have close contact with a person with symptomatic, laboratory-confirmed COVID-19 or a person under investigation.     Close contacts should monitor their health; they should call their healthcare provider right away if they develop symptoms suggestive of COVID-19 (e.g., fever, cough, shortness of breath)     Close contacts should also follow these recommendations:  • Make sure that you understand and can help the patient follow their healthcare provider’s instructions for medication(s) and care. You should help the patient with basic needs in the home and provide support for getting groceries, prescriptions, and other personal needs.  • Monitor the patient’s symptoms. If the patient is getting sicker, call his or her healthcare provider and tell them that the patient has laboratory-confirmed COVID-19. This will help the healthcare provider’s office take steps to keep other people in the office or waiting room from getting infected. Ask the healthcare provider to call the local or state health department for additional guidance. If the patient has a medical emergency and you need to call 911, notify the dispatch personnel that the patient has, or is being evaluated for COVID-19.  • Household members should stay in another room or be  from the patient as much as possible. Household members should use a separate bedroom and bathroom, if available.  • Prohibit visitors who do not have an essential need to be in the home.  • Household members should care for any pets in the home. Do not  handle pets or other animals while sick.  For more information, see COVID-19 and Animals.  • Make sure that shared spaces in the home have good air flow, such as by an air conditioner or an opened window, weather permitting.  • Perform hand hygiene frequently. Wash your hands often with soap and water for at least 20 seconds or use an alcohol-based hand  that contains 60 to 95% alcohol, covering all surfaces of your hands and rubbing them together until they feel dry. Soap and water should be used preferentially if hands are visibly dirty.  • Avoid touching your eyes, nose, and mouth with unwashed hands.  • The patient should wear a facemask when you are around other people. If the patient is not able to wear a facemask (for example, because it causes trouble breathing), you, as the caregiver, should wear a mask when you are in the same room as the patient.  • Wear a disposable facemask and gloves when you touch or have contact with the patient’s blood, stool, or body fluids, such as saliva, sputum, nasal mucus, vomit, or urine.   o Throw out disposable facemasks and gloves after using them. Do not reuse.  o When removing personal protective equipment, first remove and dispose of gloves. Then, immediately clean your hands with soap and water or alcohol-based hand . Next, remove and dispose of facemask, and immediately clean your hands again with soap and water or alcohol-based hand .  • Avoid sharing household items with the patient. You should not share dishes, drinking glasses, cups, eating utensils, towels, bedding, or other items. After the patient uses these items, you should wash them thoroughly (see below “Wash laundry thoroughly”).  • Clean all “high-touch” surfaces, such as counters, tabletops, doorknobs, bathroom fixtures, toilets, phones, keyboards, tablets, and bedside tables, every day. Also, clean any surfaces that may have blood, stool, or body fluids on them.   o Use a  household cleaning spray or wipe, according to the label instructions. Labels contain instructions for safe and effective use of the cleaning product including precautions you should take when applying the product, such as wearing gloves and making sure you have good ventilation during use of the product.  • Wash laundry thoroughly.   o Immediately remove and wash clothes or bedding that have blood, stool, or body fluids on them.  o Wear disposable gloves while handling soiled items and keep soiled items away from your body. Clean your hands (with soap and water or an alcohol-based hand ) immediately after removing your gloves.  o Read and follow directions on labels of laundry or clothing items and detergent. In general, using a normal laundry detergent according to washing machine instructions and dry thoroughly using the warmest temperatures recommended on the clothing label.  • Place all used disposable gloves, facemasks, and other contaminated items in a lined container before disposing of them with other household waste. Clean your hands (with soap and water or an alcohol-based hand ) immediately after handling these items. Soap and water should be used preferentially if hands are visibly dirty.  • Discuss any additional questions with your state or local health department or healthcare provider.    Adapted from information provided by the Centers for Disease Control and Prevention.  For more information, visit https://www.cdc.gov/coronavirus/2019-ncov/hcp/guidance-prevent-spread.htmlCOVID-19  COVID-19 is a respiratory infection that is caused by a virus called severe acute respiratory syndrome coronavirus 2 (SARS-CoV-2). The disease is also known as coronavirus disease or novel coronavirus. In some people, the virus may not cause any symptoms. In others, it may cause a serious infection. The infection can get worse quickly and can lead to complications, such as:  · Pneumonia, or infection of  the lungs.  · Acute respiratory distress syndrome or ARDS. This is a condition in which fluid build-up in the lungs prevents the lungs from filling with air and passing oxygen into the blood.  · Acute respiratory failure. This is a condition in which there is not enough oxygen passing from the lungs to the body or when carbon dioxide is not passing from the lungs out of the body.  · Sepsis or septic shock. This is a serious bodily reaction to an infection.  · Blood clotting problems.  · Secondary infections due to bacteria or fungus.  · Organ failure. This is when your body's organs stop working.  The virus that causes COVID-19 is contagious. This means that it can spread from person to person through droplets from coughs and sneezes (respiratory secretions).  What are the causes?  This illness is caused by a virus. You may catch the virus by:  · Breathing in droplets from an infected person. Droplets can be spread by a person breathing, speaking, singing, coughing, or sneezing.  · Touching something, like a table or a doorknob, that was exposed to the virus (contaminated) and then touching your mouth, nose, or eyes.  What increases the risk?  Risk for infection  You are more likely to be infected with this virus if you:  · Are within 6 feet (2 meters) of a person with COVID-19.  · Provide care for or live with a person who is infected with COVID-19.  · Spend time in crowded indoor spaces or live in shared housing.  Risk for serious illness  You are more likely to become seriously ill from the virus if you:  · Are 50 years of age or older. The higher your age, the more you are at risk for serious illness.  · Live in a nursing home or long-term care facility.  · Have cancer.  · Have a long-term (chronic) disease such as:  ? Chronic lung disease, including chronic obstructive pulmonary disease or asthma.  ? A long-term disease that lowers your body's ability to fight infection (immunocompromised).  ? Heart disease,  "including heart failure, a condition in which the arteries that lead to the heart become narrow or blocked (coronary artery disease), a disease which makes the heart muscle thick, weak, or stiff (cardiomyopathy).  ? Diabetes.  ? Chronic kidney disease.  ? Sickle cell disease, a condition in which red blood cells have an abnormal \"sickle\" shape.  ? Liver disease.  · Are obese.  What are the signs or symptoms?  Symptoms of this condition can range from mild to severe. Symptoms may appear any time from 2 to 14 days after being exposed to the virus. They include:  · A fever or chills.  · A cough.  · Difficulty breathing.  · Headaches, body aches, or muscle aches.  · Runny or stuffy (congested) nose.  · A sore throat.  · New loss of taste or smell.  Some people may also have stomach problems, such as nausea, vomiting, or diarrhea.  Other people may not have any symptoms of COVID-19.  How is this diagnosed?  This condition may be diagnosed based on:  · Your signs and symptoms, especially if:  ? You live in an area with a COVID-19 outbreak.  ? You recently traveled to or from an area where the virus is common.  ? You provide care for or live with a person who was diagnosed with COVID-19.  ? You were exposed to a person who was diagnosed with COVID-19.  · A physical exam.  · Lab tests, which may include:  ? Taking a sample of fluid from the back of your nose and throat (nasopharyngeal fluid), your nose, or your throat using a swab.  ? A sample of mucus from your lungs (sputum).  ? Blood tests.  · Imaging tests, which may include, X-rays, CT scan, or ultrasound.  How is this treated?  At present, there is no medicine to treat COVID-19. Medicines that treat other diseases are being used on a trial basis to see if they are effective against COVID-19.  Your health care provider will talk with you about ways to treat your symptoms. For most people, the infection is mild and can be managed at home with rest, fluids, and " over-the-counter medicines.  Treatment for a serious infection usually takes places in a hospital intensive care unit (ICU). It may include one or more of the following treatments. These treatments are given until your symptoms improve.  · Receiving fluids and medicines through an IV.  · Supplemental oxygen. Extra oxygen is given through a tube in the nose, a face mask, or a max.  · Positioning you to lie on your stomach (prone position). This makes it easier for oxygen to get into the lungs.  · Continuous positive airway pressure (CPAP) or bi-level positive airway pressure (BPAP) machine. This treatment uses mild air pressure to keep the airways open. A tube that is connected to a motor delivers oxygen to the body.  · Ventilator. This treatment moves air into and out of the lungs by using a tube that is placed in your windpipe.  · Tracheostomy. This is a procedure to create a hole in the neck so that a breathing tube can be inserted.  · Extracorporeal membrane oxygenation (ECMO). This procedure gives the lungs a chance to recover by taking over the functions of the heart and lungs. It supplies oxygen to the body and removes carbon dioxide.  Follow these instructions at home:  Lifestyle  · If you are sick, stay home except to get medical care. Your health care provider will tell you how long to stay home. Call your health care provider before you go for medical care.  · Rest at home as told by your health care provider.  · Do not use any products that contain nicotine or tobacco, such as cigarettes, e-cigarettes, and chewing tobacco. If you need help quitting, ask your health care provider.  · Return to your normal activities as told by your health care provider. Ask your health care provider what activities are safe for you.  General instructions  · Take over-the-counter and prescription medicines only as told by your health care provider.  · Drink enough fluid to keep your urine pale yellow.  · Keep all follow-up  visits as told by your health care provider. This is important.  How is this prevented?    There is no vaccine to help prevent COVID-19 infection. However, there are steps you can take to protect yourself and others from this virus.  To protect yourself:   · Do not travel to areas where COVID-19 is a risk. The areas where COVID-19 is reported change often. To identify high-risk areas and travel restrictions, check the CDC travel website: wwwnc.cdc.gov/travel/notices  · If you live in, or must travel to, an area where COVID-19 is a risk, take precautions to avoid infection.  ? Stay away from people who are sick.  ? Wash your hands often with soap and water for 20 seconds. If soap and water are not available, use an alcohol-based hand .  ? Avoid touching your mouth, face, eyes, or nose.  ? Avoid going out in public, follow guidance from your state and local health authorities.  ? If you must go out in public, wear a cloth face covering or face mask. Make sure your mask covers your nose and mouth.  ? Avoid crowded indoor spaces. Stay at least 6 feet (2 meters) away from others.  ? Disinfect objects and surfaces that are frequently touched every day. This may include:  § Counters and tables.  § Doorknobs and light switches.  § Sinks and faucets.  § Electronics, such as phones, remote controls, keyboards, computers, and tablets.  To protect others:  If you have symptoms of COVID-19, take steps to prevent the virus from spreading to others.  · If you think you have a COVID-19 infection, contact your health care provider right away. Tell your health care team that you think you may have a COVID-19 infection.  · Stay home. Leave your house only to seek medical care. Do not use public transport.  · Do not travel while you are sick.  · Wash your hands often with soap and water for 20 seconds. If soap and water are not available, use alcohol-based hand .  · Stay away from other members of your household. Let  healthy household members care for children and pets, if possible. If you have to care for children or pets, wash your hands often and wear a mask. If possible, stay in your own room, separate from others. Use a different bathroom.  · Make sure that all people in your household wash their hands well and often.  · Cough or sneeze into a tissue or your sleeve or elbow. Do not cough or sneeze into your hand or into the air.  · Wear a cloth face covering or face mask. Make sure your mask covers your nose and mouth.  Where to find more information  · Centers for Disease Control and Prevention: www.cdc.gov/coronavirus/2019-ncov/index.html  · World Health Organization: www.who.int/health-topics/coronavirus  Contact a health care provider if:  · You live in or have traveled to an area where COVID-19 is a risk and you have symptoms of the infection.  · You have had contact with someone who has COVID-19 and you have symptoms of the infection.  Get help right away if:  · You have trouble breathing.  · You have pain or pressure in your chest.  · You have confusion.  · You have bluish lips and fingernails.  · You have difficulty waking from sleep.  · You have symptoms that get worse.  These symptoms may represent a serious problem that is an emergency. Do not wait to see if the symptoms will go away. Get medical help right away. Call your local emergency services (911 in the [place] Do not drive yourself to the hospital. Let the emergency medical personnel know if you think you have COVID-19.  Summary  · COVID-19 is a respiratory infection that is caused by a virus. It is also known as coronavirus disease or novel coronavirus. It can cause serious infections, such as pneumonia, acute respiratory distress syndrome, acute respiratory failure, or sepsis.  · The virus that causes COVID-19 is contagious. This means that it can spread from person to person through droplets from breathing, speaking, singing, coughing, or  sneezing.  · You are more likely to develop a serious illness if you are 50 years of age or older, have a weak immune system, live in a nursing home, or have chronic disease.  · There is no medicine to treat COVID-19. Your health care provider will talk with you about ways to treat your symptoms.  · Take steps to protect yourself and others from infection. Wash your hands often and disinfect objects and surfaces that are frequently touched every day. Stay away from people who are sick and wear a mask if you are sick.  This information is not intended to replace advice given to you by your health care provider. Make sure you discuss any questions you have with your health care provider.  Document Revised: 10/16/2020 Document Reviewed: 01/23/2020  [employer] Patient Education © 2020 [employer]

## 2021-01-20 NOTE — PROGRESS NOTES
Subjective   Evelyn Garcia is a 46 y.o. female.     She has body aches that started a week ago. She describes it as severe muscles aches especially in her neck and back, headache, ear pain, runny nose. She has not checked her temperature, she has had some chills, she has had some mild difficulty catching her breath at times, like she has to pay attention to breathing. She thinks her sense of taste may be diminished. She has had nausea at times, her appetite is decreased. She has been drinking normally.        The following portions of the patient's history were reviewed and updated as appropriate: allergies, current medications, past family history, past medical history, past social history, past surgical history and problem list.    Review of Systems   Constitutional: Positive for appetite change, chills and fatigue. Negative for fever.   HENT: Positive for ear pain and rhinorrhea.    Respiratory: Positive for cough and shortness of breath. Negative for chest tightness.    Gastrointestinal: Positive for nausea. Negative for diarrhea and vomiting.   Skin: Negative for color change and rash.   Neurological: Positive for headache.       Objective   Physical Exam  Constitutional:       General: She is not in acute distress.     Appearance: She is well-developed. She is not diaphoretic.   Pulmonary:      Effort: Pulmonary effort is normal.   Neurological:      Mental Status: She is alert and oriented to person, place, and time.   Psychiatric:         Behavior: Behavior normal.           Assessment/Plan   Diagnoses and all orders for this visit:    1. Encounter by telehealth for suspected COVID-19 (Primary)  -     COVID-19,[facility] ROUTINE, NP/OP SWAB IN TRANSPORT MEDIA OR ESWAB 72 HR TAT - Swab, Nasopharynx; Future        I spent 15 minutes in the patient's chart for this video visit

## 2021-02-08 ENCOUNTER — PATIENT MESSAGE (OUTPATIENT)
Dept: FAMILY MEDICINE CLINIC | Facility: CLINIC | Age: 46
End: 2021-02-08

## 2021-02-08 RX ORDER — CEFDINIR 300 MG/1
300 CAPSULE ORAL 2 TIMES DAILY
Qty: 20 CAPSULE | Refills: 0 | Status: SHIPPED | OUTPATIENT
Start: 2021-02-08 | End: 2021-11-10

## 2021-07-29 RX ORDER — ATORVASTATIN CALCIUM 40 MG/1
TABLET, FILM COATED ORAL
Qty: 90 TABLET | Refills: 0 | Status: SHIPPED | OUTPATIENT
Start: 2021-07-29 | End: 2021-11-21

## 2021-08-19 ENCOUNTER — PATIENT MESSAGE (OUTPATIENT)
Dept: FAMILY MEDICINE CLINIC | Facility: CLINIC | Age: 46
End: 2021-08-19

## 2021-08-20 RX ORDER — ESCITALOPRAM OXALATE 20 MG/1
20 TABLET ORAL DAILY
Qty: 90 TABLET | Refills: 1 | Status: SHIPPED | OUTPATIENT
Start: 2021-08-20 | End: 2022-03-04

## 2021-09-24 ENCOUNTER — TELEPHONE (OUTPATIENT)
Dept: FAMILY MEDICINE CLINIC | Facility: CLINIC | Age: 46
End: 2021-09-24

## 2021-09-24 NOTE — TELEPHONE ENCOUNTER
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

## 2021-11-10 ENCOUNTER — OFFICE VISIT (OUTPATIENT)
Dept: FAMILY MEDICINE CLINIC | Facility: CLINIC | Age: 46
End: 2021-11-10

## 2021-11-10 ENCOUNTER — LAB (OUTPATIENT)
Dept: FAMILY MEDICINE CLINIC | Facility: CLINIC | Age: 46
End: 2021-11-10

## 2021-11-10 VITALS
HEART RATE: 73 BPM | BODY MASS INDEX: 30.86 KG/M2 | WEIGHT: 158 LBS | TEMPERATURE: 97.7 F | OXYGEN SATURATION: 94 % | DIASTOLIC BLOOD PRESSURE: 85 MMHG | SYSTOLIC BLOOD PRESSURE: 131 MMHG

## 2021-11-10 DIAGNOSIS — Z12.31 ENCOUNTER FOR SCREENING MAMMOGRAM FOR BREAST CANCER: ICD-10-CM

## 2021-11-10 DIAGNOSIS — I10 PRIMARY HYPERTENSION: Chronic | ICD-10-CM

## 2021-11-10 DIAGNOSIS — Z23 NEED FOR VACCINATION: ICD-10-CM

## 2021-11-10 DIAGNOSIS — Z00.00 WELLNESS EXAMINATION: Primary | ICD-10-CM

## 2021-11-10 DIAGNOSIS — R73.9 HYPERGLYCEMIA: ICD-10-CM

## 2021-11-10 DIAGNOSIS — Z12.11 SCREEN FOR COLON CANCER: ICD-10-CM

## 2021-11-10 LAB
ALBUMIN SERPL-MCNC: 4.6 G/DL (ref 3.5–5.2)
ALBUMIN/GLOB SERPL: 1.5 G/DL
ALP SERPL-CCNC: 31 U/L (ref 39–117)
ALT SERPL W P-5'-P-CCNC: 28 U/L (ref 1–33)
ANION GAP SERPL CALCULATED.3IONS-SCNC: 7.4 MMOL/L (ref 5–15)
AST SERPL-CCNC: 18 U/L (ref 1–32)
BILIRUB SERPL-MCNC: 0.3 MG/DL (ref 0–1.2)
BUN SERPL-MCNC: 10 MG/DL (ref 6–20)
BUN/CREAT SERPL: 15.6 (ref 7–25)
CALCIUM SPEC-SCNC: 9.8 MG/DL (ref 8.6–10.5)
CHLORIDE SERPL-SCNC: 105 MMOL/L (ref 98–107)
CHOLEST SERPL-MCNC: 192 MG/DL (ref 0–200)
CO2 SERPL-SCNC: 27.6 MMOL/L (ref 22–29)
CREAT SERPL-MCNC: 0.64 MG/DL (ref 0.57–1)
GFR SERPL CREATININE-BSD FRML MDRD: 99 ML/MIN/1.73
GLOBULIN UR ELPH-MCNC: 3.1 GM/DL
GLUCOSE SERPL-MCNC: 100 MG/DL (ref 65–99)
HBA1C MFR BLD: 5.4 % (ref 3.5–5.6)
HDLC SERPL-MCNC: 45 MG/DL (ref 40–60)
LDLC SERPL CALC-MCNC: 118 MG/DL (ref 0–100)
LDLC/HDLC SERPL: 2.54 {RATIO}
POTASSIUM SERPL-SCNC: 4.2 MMOL/L (ref 3.5–5.2)
PROT SERPL-MCNC: 7.7 G/DL (ref 6–8.5)
SODIUM SERPL-SCNC: 140 MMOL/L (ref 136–145)
TRIGL SERPL-MCNC: 163 MG/DL (ref 0–150)
TSH SERPL DL<=0.05 MIU/L-ACNC: 1.53 UIU/ML (ref 0.27–4.2)
VLDLC SERPL-MCNC: 29 MG/DL (ref 5–40)

## 2021-11-10 PROCEDURE — 80053 COMPREHEN METABOLIC PANEL: CPT | Performed by: FAMILY MEDICINE

## 2021-11-10 PROCEDURE — 83036 HEMOGLOBIN GLYCOSYLATED A1C: CPT | Performed by: FAMILY MEDICINE

## 2021-11-10 PROCEDURE — 90707 MMR VACCINE SC: CPT | Performed by: FAMILY MEDICINE

## 2021-11-10 PROCEDURE — 99396 PREV VISIT EST AGE 40-64: CPT | Performed by: FAMILY MEDICINE

## 2021-11-10 PROCEDURE — 90686 IIV4 VACC NO PRSV 0.5 ML IM: CPT | Performed by: FAMILY MEDICINE

## 2021-11-10 PROCEDURE — 80061 LIPID PANEL: CPT | Performed by: FAMILY MEDICINE

## 2021-11-10 PROCEDURE — 36415 COLL VENOUS BLD VENIPUNCTURE: CPT | Performed by: FAMILY MEDICINE

## 2021-11-10 PROCEDURE — 90471 IMMUNIZATION ADMIN: CPT | Performed by: FAMILY MEDICINE

## 2021-11-10 PROCEDURE — 90716 VAR VACCINE LIVE SUBQ: CPT | Performed by: FAMILY MEDICINE

## 2021-11-10 PROCEDURE — 84443 ASSAY THYROID STIM HORMONE: CPT | Performed by: FAMILY MEDICINE

## 2021-11-10 PROCEDURE — 90472 IMMUNIZATION ADMIN EACH ADD: CPT | Performed by: FAMILY MEDICINE

## 2021-11-10 RX ORDER — LISINOPRIL 10 MG/1
10 TABLET ORAL DAILY
Qty: 90 TABLET | Refills: 1 | Status: SHIPPED | OUTPATIENT
Start: 2021-11-10 | End: 2022-07-27

## 2021-11-10 NOTE — PROGRESS NOTES
Alessandro Garcia is a 46 y.o. female and is here for a comprehensive physical exam. The patient reports problems - needs vaccines for work..    Do you take any herbs or supplements that were not prescribed by a doctor? not asked  Are you taking calcium supplements? no  Are you taking aspirin daily? no    The following portions of the patient's history were reviewed and updated as appropriate: allergies, current medications, past family history, past medical history, past social history, past surgical history and problem list.    Review of Systems  Do you have pain that bothers you in your daily life? not asked  Pertinent items are noted in HPI.    Objective   /85 (BP Location: Left arm, Patient Position: Sitting, Cuff Size: Adult)   Pulse 73   Temp 97.7 °F (36.5 °C) (Infrared)   Wt 71.7 kg (158 lb)   LMP  (LMP Unknown)   SpO2 94%   BMI 30.86 kg/m²   General appearance: alert, appears stated age and cooperative  Head: Normocephalic, without obvious abnormality, atraumatic  Eyes: conjunctivae/corneas clear. PERRL, EOM's intact. Fundi benign.  Ears: normal TM's and external ear canals both ears  Throat: lips, mucosa, and tongue normal; teeth and gums normal  Neck: no adenopathy, supple, symmetrical, trachea midline and thyroid not enlarged, symmetric, no tenderness/mass/nodules  Lungs: clear to auscultation bilaterally  Heart: regular rate and rhythm, S1, S2 normal, no murmur, click, rub or gallop  Abdomen: soft, non-tender; bowel sounds normal; no masses,  no organomegaly  Extremities: extremities normal, atraumatic, no cyanosis or edema  Skin: Skin color, texture, turgor normal. No rashes or lesions  Lymph nodes: Cervical, supraclavicular, and axillary nodes normal.  Neurologic: Grossly normal     Office Visit on 11/10/2021   Component Date Value Ref Range Status   • Total Cholesterol 11/10/2021 192  0 - 200 mg/dL Final   • Triglycerides 11/10/2021 163* 0 - 150 mg/dL Final   • HDL Cholesterol  11/10/2021 45  40 - 60 mg/dL Final   • LDL Cholesterol  11/10/2021 118* 0 - 100 mg/dL Final   • VLDL Cholesterol 11/10/2021 29  5 - 40 mg/dL Final   • LDL/HDL Ratio 11/10/2021 2.54   Final   • Glucose 11/10/2021 100* 65 - 99 mg/dL Final   • BUN 11/10/2021 10  6 - 20 mg/dL Final   • Creatinine 11/10/2021 0.64  0.57 - 1.00 mg/dL Final   • Sodium 11/10/2021 140  136 - 145 mmol/L Final   • Potassium 11/10/2021 4.2  3.5 - 5.2 mmol/L Final   • Chloride 11/10/2021 105  98 - 107 mmol/L Final   • CO2 11/10/2021 27.6  22.0 - 29.0 mmol/L Final   • Calcium 11/10/2021 9.8  8.6 - 10.5 mg/dL Final   • Total Protein 11/10/2021 7.7  6.0 - 8.5 g/dL Final   • Albumin 11/10/2021 4.60  3.50 - 5.20 g/dL Final   • ALT (SGPT) 11/10/2021 28  1 - 33 U/L Final   • AST (SGOT) 11/10/2021 18  1 - 32 U/L Final   • Alkaline Phosphatase 11/10/2021 31* 39 - 117 U/L Final   • Total Bilirubin 11/10/2021 0.3  0.0 - 1.2 mg/dL Final   • eGFR Non African Amer 11/10/2021 99  >60 mL/min/1.73 Final   • Globulin 11/10/2021 3.1  gm/dL Final   • A/G Ratio 11/10/2021 1.5  g/dL Final   • BUN/Creatinine Ratio 11/10/2021 15.6  7.0 - 25.0 Final   • Anion Gap 11/10/2021 7.4  5.0 - 15.0 mmol/L Final   • TSH 11/10/2021 1.530  0.270 - 4.200 uIU/mL Final   • Hemoglobin A1C 11/10/2021 5.4  3.5 - 5.6 % Final       Assessment/Plan   Healthy female exam.  Diagnoses and all orders for this visit:    1. Wellness examination (Primary)  -     Lipid Panel  -     Comprehensive Metabolic Panel  -     TSH  -     Hemoglobin A1c    2. Need for vaccination  -     FluLaval/Fluarix/Fluzone >6 Months ([id number])  -     MMR Vaccine Subcutaneous  -     Varicella Vaccine Subcutaneous    3. Primary hypertension  -     Lipid Panel  -     Comprehensive Metabolic Panel  -     TSH  -     lisinopril (PRINIVIL,ZESTRIL) 10 MG tablet; Take 1 tablet by mouth Daily.  Dispense: 90 tablet; Refill: 1    4. Hyperglycemia  -     Lipid Panel  -     Comprehensive Metabolic Panel  -     TSH  -     Hemoglobin  A1c    5. Encounter for screening mammogram for breast cancer  -     Mammo Screening Digital Tomosynthesis Bilateral With CAD    6. Screen for colon cancer  -     Ambulatory Referral For Screening Colonoscopy      1. Well exam  2. Patient Counseling:  --Nutrition: Stressed importance of moderation in sodium/caffeine intake, saturated fat and cholesterol, caloric balance, sufficient intake of fresh fruits, vegetables, fiber, calcium, iron, and 1 mg of folate supplement per day (for females capable of pregnancy).  --Discussed the issue of estrogen replacement, calcium supplement, and the daily use of baby aspirin.  --Exercise: Stressed the importance of regular exercise.   --Substance Abuse: Discussed cessation/primary prevention of tobacco, alcohol, or other drug use; driving or other dangerous activities under the influence; availability of treatment for abuse.    --Sexuality: Discussed sexually transmitted diseases, partner selection, use of condoms, avoidance of unintended pregnancy  and contraceptive alternatives.   --Injury prevention: Discussed safety belts, safety helmets, smoke detector, smoking near bedding or upholstery.   --Dental health: Discussed importance of regular tooth brushing, flossing, and dental visits.  --Immunizations reviewed.  --Discussed benefits of screening colonoscopy.  --After hours service discussed with patient    3. Discussed the patient's BMI with her.  The BMI is above average; BMI management plan is completed  4. Follow up in one year

## 2021-11-11 ENCOUNTER — TELEPHONE (OUTPATIENT)
Dept: FAMILY MEDICINE CLINIC | Facility: CLINIC | Age: 46
End: 2021-11-11

## 2021-11-11 NOTE — TELEPHONE ENCOUNTER
Caller: Evelyn Garcia     Relationship: SELF    Best call back number: [phone]    What is your medical concern? PATIENT HAD CHICKEN POX, FLU, MMR VACCINES YESTERDAY. CHICKEN POX WAS IN LEFT ARM. LEFT ARM IS RAISED AS BIG AS A BASEBALL WHERE THE INJECTION WAS, ITCHY, RED, WARM TO THE TOUCH.     How long has this issue been going on? POPPED UP THIS MORNING-STARTED THE SIZE OF A QUARTER BUT KEEPS GETTING BIGGER    Is your provider already aware of this issue? NO    Have you been treated for this issue? NO

## 2021-11-11 NOTE — TELEPHONE ENCOUNTER
Please advise the patient to use cool compresses to the area.  She can even take Benadryl as needed for itchiness.  If her symptoms are getting worse or not improving she will need to be seen and evaluated and possibly even go to the urgent care if needed.  Please let me know if she has any questions.  Thank you.

## 2021-11-21 RX ORDER — ATORVASTATIN CALCIUM 40 MG/1
TABLET, FILM COATED ORAL
Qty: 90 TABLET | Refills: 3 | Status: SHIPPED | OUTPATIENT
Start: 2021-11-21

## 2021-11-28 PROBLEM — Z12.11 SCREEN FOR COLON CANCER: Status: ACTIVE | Noted: 2021-11-28

## 2021-11-28 PROBLEM — Z12.31 ENCOUNTER FOR SCREENING MAMMOGRAM FOR BREAST CANCER: Status: ACTIVE | Noted: 2021-11-28

## 2021-12-01 ENCOUNTER — HOSPITAL ENCOUNTER (OUTPATIENT)
Dept: CT IMAGING | Facility: HOSPITAL | Age: 46
Discharge: HOME | End: 2021-12-01
Admitting: FAMILY MEDICINE

## 2021-12-01 ENCOUNTER — TRANSCRIBE ORDERS (OUTPATIENT)
Dept: ADMINISTRATIVE | Facility: HOSPITAL | Age: 46
End: 2021-12-01

## 2021-12-01 ENCOUNTER — OFFICE VISIT (OUTPATIENT)
Dept: FAMILY MEDICINE CLINIC | Facility: CLINIC | Age: 46
End: 2021-12-01

## 2021-12-01 ENCOUNTER — LAB (OUTPATIENT)
Dept: FAMILY MEDICINE CLINIC | Facility: CLINIC | Age: 46
End: 2021-12-01

## 2021-12-01 VITALS
OXYGEN SATURATION: 94 % | BODY MASS INDEX: 30.94 KG/M2 | TEMPERATURE: 97.8 F | HEART RATE: 93 BPM | SYSTOLIC BLOOD PRESSURE: 139 MMHG | HEIGHT: 60 IN | DIASTOLIC BLOOD PRESSURE: 81 MMHG | WEIGHT: 157.6 LBS

## 2021-12-01 DIAGNOSIS — M54.50 ACUTE BILATERAL LOW BACK PAIN WITHOUT SCIATICA: ICD-10-CM

## 2021-12-01 DIAGNOSIS — G44.52 NEW DAILY PERSISTENT HEADACHE: Primary | ICD-10-CM

## 2021-12-01 PROCEDURE — U0004 COV-19 TEST NON-CDC HGH THRU: HCPCS | Performed by: FAMILY MEDICINE

## 2021-12-01 PROCEDURE — 99214 OFFICE O/P EST MOD 30 MIN: CPT | Performed by: FAMILY MEDICINE

## 2021-12-01 PROCEDURE — C9803 HOPD COVID-19 SPEC COLLECT: HCPCS

## 2021-12-01 PROCEDURE — 36415 COLL VENOUS BLD VENIPUNCTURE: CPT | Performed by: FAMILY MEDICINE

## 2021-12-01 PROCEDURE — 81001 URINALYSIS AUTO W/SCOPE: CPT | Performed by: FAMILY MEDICINE

## 2021-12-01 PROCEDURE — 80048 BASIC METABOLIC PNL TOTAL CA: CPT | Performed by: FAMILY MEDICINE

## 2021-12-01 PROCEDURE — 85025 COMPLETE CBC W/AUTO DIFF WBC: CPT | Performed by: FAMILY MEDICINE

## 2021-12-01 PROCEDURE — 70450 CT HEAD/BRAIN W/O DYE: CPT

## 2021-12-01 RX ORDER — METHYLPREDNISOLONE 4 MG/1
TABLET ORAL
Qty: 1 EACH | Refills: 0 | Status: SHIPPED | OUTPATIENT
Start: 2021-12-01 | End: 2021-12-13

## 2021-12-01 RX ORDER — TIZANIDINE 2 MG/1
2 TABLET ORAL 2 TIMES DAILY PRN
Qty: 20 TABLET | Refills: 0 | Status: SHIPPED | OUTPATIENT
Start: 2021-12-01 | End: 2021-12-13

## 2021-12-01 NOTE — PROGRESS NOTES
Subjective   Chief Complaint   Patient presents with   • Fatigue   • Nausea   • Back Pain   • Headache     1 week     Evelyn Garcia is a 46 y.o. female.     Patient Care Team:  Kathia Cooper MD as PCP - General    She is coming in today due to not feeling well for the last week or so.  She has been experiencing some fatigue, headache, and also back pain.  Her pain is constant but at times it gets even worse.  When headache is really bad it is 8 out of 10.  She reports upper and lower back pain.  At times she has been having some burning sensations in her back.  She also has been experiencing some nausea, but she has not been vomiting.  She denies any fever, difficulty breathing, or cough or congestion.  She is fully vaccinated against COVID-19.  She was exposed to COVID-19 about 2 weeks ago.       The following portions of the patient's history were reviewed and updated as appropriate: allergies, current medications, past family history, past medical history, past social history, past surgical history and problem list.  Past Medical History:   Diagnosis Date   • Anxiety    • GERD (gastroesophageal reflux disease)    • Hypertension      Past Surgical History:   Procedure Laterality Date   •  SECTION      X2   • CHOLECYSTECTOMY     • HERNIA REPAIR      X3   • HYSTERECTOMY     • RECTAL POLYPECTOMY       The patient has a family history of  Family History   Problem Relation Age of Onset   • Heart disease Mother    • Heart disease Father      Social History     Socioeconomic History   • Marital status:    Tobacco Use   • Smoking status: Never Smoker   • Smokeless tobacco: Never Used   Substance and Sexual Activity   • Alcohol use: Yes     Comment: SOCIAL   • Drug use: Never   • Sexual activity: Yes       Review of Systems   Constitutional: Negative for activity change, appetite change, chills and fever.   HENT: Negative for congestion, ear pain, postnasal drip, sinus pressure, sore throat and swollen  "glands.    Respiratory: Negative for cough, choking, chest tightness, shortness of breath, wheezing and stridor.    Cardiovascular: Negative for chest pain.   Gastrointestinal: Positive for nausea. Negative for vomiting.   Musculoskeletal: Positive for back pain.   Skin: Negative for dry skin and rash.   Neurological: Positive for headache.     Visit Vitals  /81 (BP Location: Left arm, Patient Position: Sitting, Cuff Size: Adult)   Pulse 93   Temp 97.8 °F (36.6 °C) (Temporal)   Ht 152.4 cm (60\")   Wt 71.5 kg (157 lb 9.6 oz)   LMP  (LMP Unknown)   SpO2 94%   BMI 30.78 kg/m²       Current Outpatient Medications:   •  atorvastatin (LIPITOR) 40 MG tablet, Take 1 tablet by mouth once daily, Disp: 90 tablet, Rfl: 3  •  escitalopram (Lexapro) 20 MG tablet, Take 1 tablet by mouth Daily., Disp: 90 tablet, Rfl: 1  •  lansoprazole (PREVACID) 15 MG capsule, Take 15 mg by mouth Daily., Disp: , Rfl:   •  lisinopril (PRINIVIL,ZESTRIL) 10 MG tablet, Take 1 tablet by mouth Daily., Disp: 90 tablet, Rfl: 1  •  Multiple Vitamins-Minerals (MULTIVITAMIN ADULT PO), Take 1 tablet by mouth Daily., Disp: , Rfl:   •  methylPREDNISolone (Medrol) 4 MG dose pack, Take as directed on package instructions., Disp: 1 each, Rfl: 0  •  tiZANidine (ZANAFLEX) 2 MG tablet, Take 1 tablet by mouth 2 (Two) Times a Day As Needed for Muscle Spasms., Disp: 20 tablet, Rfl: 0    Objective   Physical Exam  Vitals and nursing note reviewed.   Constitutional:       General: She is not in acute distress.     Appearance: Normal appearance. She is well-developed. She is not ill-appearing.   HENT:      Head: Normocephalic and atraumatic.   Cardiovascular:      Rate and Rhythm: Normal rate and regular rhythm.      Heart sounds: Normal heart sounds. No murmur heard.  No gallop.    Pulmonary:      Effort: Pulmonary effort is normal. No respiratory distress.      Breath sounds: Normal breath sounds. No wheezing, rhonchi or rales.   Chest:      Chest wall: No " tenderness.   Musculoskeletal:      Cervical back: Normal range of motion and neck supple. No rigidity.      Comments: No palpation tenderness over the muscles in the upper or lower back, no spinal processes palpation tenderness.  Negative straight leg raising test bilaterally.   Lymphadenopathy:      Cervical: No cervical adenopathy.   Neurological:      General: No focal deficit present.      Mental Status: She is alert and oriented to person, place, and time. Mental status is at baseline.      Cranial Nerves: No cranial nerve deficit.      Sensory: No sensory deficit.      Motor: No weakness.      Coordination: Coordination normal.   Psychiatric:         Mood and Affect: Mood normal.       Diagnoses and all orders for this visit:    1. New daily persistent headache (Primary)  -     CT Head Without Contrast  -     COVID-19,[facility]TIMA PANTHER(ZUNILDA),[facility], NP/OP SWAB IN UTM/VTM/SALINE TRANSPORT MEDIA,24 HR TAT - Swab, Nasopharynx; Future  -     Basic Metabolic Panel  -     CBC Auto Differential  -     COVID-19,[facility]TIMA PANTHER(ZUNILDA),[facility], NP/OP SWAB IN UTM/VTM/SALINE TRANSPORT MEDIA,24 HR TAT - Swab, Nasopharynx  -     methylPREDNISolone (Medrol) 4 MG dose pack; Take as directed on package instructions.  Dispense: 1 each; Refill: 0    2. Acute bilateral low back pain without sciatica  -     CT Head Without Contrast  -     COVID-19,[facility]TIMA PANTHER(ZUNILDA),[facility], NP/OP SWAB IN UTM/VTM/SALINE TRANSPORT MEDIA,24 HR TAT - Swab, Nasopharynx; Future  -     Urinalysis With Culture If Indicated - Urine, Clean Catch  -     Basic Metabolic Panel  -     CBC Auto Differential  -     COVID-19,[facility]TIMA PANTHER(ZUNILDA),[facility], NP/OP SWAB IN UTM/VTM/SALINE TRANSPORT MEDIA,24 HR TAT - Swab, Nasopharynx  -     methylPREDNISolone (Medrol) 4 MG dose pack; Take as directed on package instructions.  Dispense: 1 each; Refill: 0  -     tiZANidine (ZANAFLEX) 2 MG tablet; Take 1 tablet by mouth 2 (Two) Times a Day As Needed for Muscle Spasms.  Dispense:  20 tablet; Refill: 0      Stat CT of the head was done today and it is reported as negative for any acute findings.  I will be testing her for COVID-19 and blood work is being done as well.  I will be starting her on steroid pack and I also gave her prescription for some muscle relaxant.  She was advised to stay off work at this point and quarantine.  Further recommendations will depend on the results of the tests and patient's progress.        Return if symptoms worsen or fail to improve, for Recheck.    Requested Prescriptions     Signed Prescriptions Disp Refills   • methylPREDNISolone (Medrol) 4 MG dose pack 1 each 0     Sig: Take as directed on package instructions.   • tiZANidine (ZANAFLEX) 2 MG tablet 20 tablet 0     Sig: Take 1 tablet by mouth 2 (Two) Times a Day As Needed for Muscle Spasms.

## 2021-12-02 DIAGNOSIS — M54.50 ACUTE BILATERAL LOW BACK PAIN WITHOUT SCIATICA: Primary | ICD-10-CM

## 2021-12-02 PROBLEM — U07.1 CLINICAL DIAGNOSIS OF COVID-19: Status: ACTIVE | Noted: 2021-12-02

## 2021-12-02 LAB
ANION GAP SERPL CALCULATED.3IONS-SCNC: 12.5 MMOL/L (ref 5–15)
BACTERIA UR QL AUTO: NORMAL /HPF
BASOPHILS # BLD AUTO: 0.05 10*3/MM3 (ref 0–0.2)
BASOPHILS NFR BLD AUTO: 0.5 % (ref 0–1.5)
BILIRUB UR QL STRIP: NEGATIVE
BUN SERPL-MCNC: 13 MG/DL (ref 6–20)
BUN/CREAT SERPL: 18.8 (ref 7–25)
CALCIUM SPEC-SCNC: 9.6 MG/DL (ref 8.6–10.5)
CHLORIDE SERPL-SCNC: 103 MMOL/L (ref 98–107)
CLARITY UR: CLEAR
CO2 SERPL-SCNC: 25.5 MMOL/L (ref 22–29)
COLOR UR: YELLOW
CREAT SERPL-MCNC: 0.69 MG/DL (ref 0.57–1)
DEPRECATED RDW RBC AUTO: 41.5 FL (ref 37–54)
EOSINOPHIL # BLD AUTO: 0.53 10*3/MM3 (ref 0–0.4)
EOSINOPHIL NFR BLD AUTO: 5.2 % (ref 0.3–6.2)
ERYTHROCYTE [DISTWIDTH] IN BLOOD BY AUTOMATED COUNT: 12.4 % (ref 12.3–15.4)
GFR SERPL CREATININE-BSD FRML MDRD: 91 ML/MIN/1.73
GLUCOSE SERPL-MCNC: 106 MG/DL (ref 65–99)
GLUCOSE UR STRIP-MCNC: NEGATIVE MG/DL
HCT VFR BLD AUTO: 42.3 % (ref 34–46.6)
HGB BLD-MCNC: 14.8 G/DL (ref 12–15.9)
HGB UR QL STRIP.AUTO: ABNORMAL
HYALINE CASTS UR QL AUTO: NORMAL /LPF
IMM GRANULOCYTES # BLD AUTO: 0.04 10*3/MM3 (ref 0–0.05)
IMM GRANULOCYTES NFR BLD AUTO: 0.4 % (ref 0–0.5)
KETONES UR QL STRIP: NEGATIVE
LEUKOCYTE ESTERASE UR QL STRIP.AUTO: NEGATIVE
LYMPHOCYTES # BLD AUTO: 3.73 10*3/MM3 (ref 0.7–3.1)
LYMPHOCYTES NFR BLD AUTO: 36.3 % (ref 19.6–45.3)
MCH RBC QN AUTO: 32.3 PG (ref 26.6–33)
MCHC RBC AUTO-ENTMCNC: 35 G/DL (ref 31.5–35.7)
MCV RBC AUTO: 92.4 FL (ref 79–97)
MONOCYTES # BLD AUTO: 0.75 10*3/MM3 (ref 0.1–0.9)
MONOCYTES NFR BLD AUTO: 7.3 % (ref 5–12)
NEUTROPHILS NFR BLD AUTO: 5.18 10*3/MM3 (ref 1.7–7)
NEUTROPHILS NFR BLD AUTO: 50.3 % (ref 42.7–76)
NITRITE UR QL STRIP: NEGATIVE
NRBC BLD AUTO-RTO: 0 /100 WBC (ref 0–0.2)
PH UR STRIP.AUTO: 6 [PH] (ref 5–8)
PLATELET # BLD AUTO: 322 10*3/MM3 (ref 140–450)
PMV BLD AUTO: 10.9 FL (ref 6–12)
POTASSIUM SERPL-SCNC: 4 MMOL/L (ref 3.5–5.2)
PROT UR QL STRIP: NEGATIVE
RBC # BLD AUTO: 4.58 10*6/MM3 (ref 3.77–5.28)
RBC # UR STRIP: NORMAL /HPF
REF LAB TEST METHOD: NORMAL
SARS-COV-2 ORF1AB RESP QL NAA+PROBE: DETECTED
SODIUM SERPL-SCNC: 141 MMOL/L (ref 136–145)
SP GR UR STRIP: 1.01 (ref 1–1.03)
SQUAMOUS #/AREA URNS HPF: NORMAL /HPF
UROBILINOGEN UR QL STRIP: ABNORMAL
WBC # UR STRIP: NORMAL /HPF
WBC NRBC COR # BLD: 10.28 10*3/MM3 (ref 3.4–10.8)

## 2021-12-02 RX ORDER — DIPHENHYDRAMINE HYDROCHLORIDE 50 MG/ML
50 INJECTION INTRAMUSCULAR; INTRAVENOUS ONCE AS NEEDED
Status: CANCELLED | OUTPATIENT
Start: 2021-12-03

## 2021-12-02 RX ORDER — EPINEPHRINE 1 MG/ML
0.3 INJECTION, SOLUTION INTRAMUSCULAR; SUBCUTANEOUS AS NEEDED
Status: CANCELLED | OUTPATIENT
Start: 2021-12-03

## 2021-12-02 RX ORDER — DIPHENHYDRAMINE HCL 25 MG
50 TABLET ORAL ONCE AS NEEDED
Status: CANCELLED | OUTPATIENT
Start: 2021-12-03

## 2021-12-02 RX ORDER — ACETAMINOPHEN AND CODEINE PHOSPHATE 300; 30 MG/1; MG/1
1 TABLET ORAL 3 TIMES DAILY PRN
Qty: 9 TABLET | Refills: 0 | Status: SHIPPED | OUTPATIENT
Start: 2021-12-02 | End: 2021-12-13

## 2021-12-02 RX ORDER — SODIUM CHLORIDE 9 MG/ML
30 INJECTION, SOLUTION INTRAVENOUS ONCE
Status: CANCELLED | OUTPATIENT
Start: 2021-12-03

## 2021-12-02 RX ORDER — METHYLPREDNISOLONE SODIUM SUCCINATE 125 MG/2ML
125 INJECTION, POWDER, LYOPHILIZED, FOR SOLUTION INTRAMUSCULAR; INTRAVENOUS AS NEEDED
Status: CANCELLED | OUTPATIENT
Start: 2021-12-03

## 2021-12-03 ENCOUNTER — HOSPITAL ENCOUNTER (EMERGENCY)
Facility: HOSPITAL | Age: 46
Discharge: HOME | End: 2021-12-03
Attending: EMERGENCY MEDICINE | Admitting: EMERGENCY MEDICINE

## 2021-12-03 ENCOUNTER — HOSPITAL ENCOUNTER (OUTPATIENT)
Dept: INFUSION THERAPY | Facility: HOSPITAL | Age: 46
Discharge: HOME | End: 2021-12-03
Admitting: FAMILY MEDICINE

## 2021-12-03 ENCOUNTER — APPOINTMENT (OUTPATIENT)
Dept: GENERAL RADIOLOGY | Facility: HOSPITAL | Age: 46
End: 2021-12-03

## 2021-12-03 ENCOUNTER — TELEPHONE (OUTPATIENT)
Dept: FAMILY MEDICINE CLINIC | Facility: CLINIC | Age: 46
End: 2021-12-03

## 2021-12-03 VITALS
HEART RATE: 121 BPM | TEMPERATURE: 97.3 F | OXYGEN SATURATION: 98 % | DIASTOLIC BLOOD PRESSURE: 102 MMHG | RESPIRATION RATE: 16 BRPM | SYSTOLIC BLOOD PRESSURE: 203 MMHG

## 2021-12-03 VITALS
TEMPERATURE: 97.8 F | HEIGHT: 60 IN | WEIGHT: 157 LBS | DIASTOLIC BLOOD PRESSURE: 64 MMHG | BODY MASS INDEX: 30.82 KG/M2 | HEART RATE: 98 BPM | OXYGEN SATURATION: 93 % | RESPIRATION RATE: 18 BRPM | SYSTOLIC BLOOD PRESSURE: 125 MMHG

## 2021-12-03 DIAGNOSIS — U07.1 CLINICAL DIAGNOSIS OF COVID-19: Primary | ICD-10-CM

## 2021-12-03 DIAGNOSIS — T88.7XXA NON-DOSE-RELATED ADVERSE EFFECT OF MEDICATION, INITIAL ENCOUNTER: ICD-10-CM

## 2021-12-03 DIAGNOSIS — U07.1 COVID: Primary | ICD-10-CM

## 2021-12-03 LAB
ANION GAP SERPL CALCULATED.3IONS-SCNC: 15 MMOL/L (ref 5–15)
BASOPHILS # BLD AUTO: 0.1 10*3/MM3 (ref 0–0.2)
BASOPHILS NFR BLD AUTO: 0.7 % (ref 0–1.5)
BUN SERPL-MCNC: 10 MG/DL (ref 6–20)
BUN/CREAT SERPL: 16.7 (ref 7–25)
CALCIUM SPEC-SCNC: 9 MG/DL (ref 8.6–10.5)
CHLORIDE SERPL-SCNC: 103 MMOL/L (ref 98–107)
CO2 SERPL-SCNC: 20 MMOL/L (ref 22–29)
CREAT SERPL-MCNC: 0.6 MG/DL (ref 0.57–1)
DEPRECATED RDW RBC AUTO: 42.4 FL (ref 37–54)
EOSINOPHIL # BLD AUTO: 0.6 10*3/MM3 (ref 0–0.4)
EOSINOPHIL NFR BLD AUTO: 4.8 % (ref 0.3–6.2)
ERYTHROCYTE [DISTWIDTH] IN BLOOD BY AUTOMATED COUNT: 13 % (ref 12.3–15.4)
GFR SERPL CREATININE-BSD FRML MDRD: 107 ML/MIN/1.73
GLUCOSE SERPL-MCNC: 132 MG/DL (ref 65–99)
HCT VFR BLD AUTO: 41 % (ref 34–46.6)
HGB BLD-MCNC: 14.8 G/DL (ref 12–15.9)
HOLD SPECIMEN: NORMAL
LYMPHOCYTES # BLD AUTO: 6.3 10*3/MM3 (ref 0.7–3.1)
LYMPHOCYTES NFR BLD AUTO: 50.1 % (ref 19.6–45.3)
MCH RBC QN AUTO: 33.7 PG (ref 26.6–33)
MCHC RBC AUTO-ENTMCNC: 36.1 G/DL (ref 31.5–35.7)
MCV RBC AUTO: 93.4 FL (ref 79–97)
MONOCYTES # BLD AUTO: 0.5 10*3/MM3 (ref 0.1–0.9)
MONOCYTES NFR BLD AUTO: 4.2 % (ref 5–12)
NEUTROPHILS NFR BLD AUTO: 40.2 % (ref 42.7–76)
NEUTROPHILS NFR BLD AUTO: 5.1 10*3/MM3 (ref 1.7–7)
NRBC BLD AUTO-RTO: 0.1 /100 WBC (ref 0–0.2)
PLATELET # BLD AUTO: 307 10*3/MM3 (ref 140–450)
PMV BLD AUTO: 8.5 FL (ref 6–12)
POTASSIUM SERPL-SCNC: 3.3 MMOL/L (ref 3.5–5.2)
RBC # BLD AUTO: 4.39 10*6/MM3 (ref 3.77–5.28)
SODIUM SERPL-SCNC: 138 MMOL/L (ref 136–145)
WBC NRBC COR # BLD: 12.6 10*3/MM3 (ref 3.4–10.8)

## 2021-12-03 PROCEDURE — 85025 COMPLETE CBC W/AUTO DIFF WBC: CPT | Performed by: EMERGENCY MEDICINE

## 2021-12-03 PROCEDURE — M0243 CASIRIVI AND IMDEVI INFUSION: HCPCS | Performed by: FAMILY MEDICINE

## 2021-12-03 PROCEDURE — 25010000002 EPINEPHRINE (ANAPHYLAXIS) 1 MG/ML SOLUTION: Performed by: FAMILY MEDICINE

## 2021-12-03 PROCEDURE — 80048 BASIC METABOLIC PNL TOTAL CA: CPT | Performed by: EMERGENCY MEDICINE

## 2021-12-03 PROCEDURE — 25010000002 DIPHENHYDRAMINE PER 50 MG: Performed by: FAMILY MEDICINE

## 2021-12-03 PROCEDURE — 96365 THER/PROPH/DIAG IV INF INIT: CPT

## 2021-12-03 PROCEDURE — 25010000002 INJECTION, CASIRIVIMAB AND IMDEVIMAB, 1200 MG: Performed by: FAMILY MEDICINE

## 2021-12-03 PROCEDURE — 71045 X-RAY EXAM CHEST 1 VIEW: CPT

## 2021-12-03 PROCEDURE — 25010000002 METHYLPREDNISOLONE PER 125 MG: Performed by: FAMILY MEDICINE

## 2021-12-03 PROCEDURE — 96374 THER/PROPH/DIAG INJ IV PUSH: CPT

## 2021-12-03 PROCEDURE — 93005 ELECTROCARDIOGRAM TRACING: CPT | Performed by: EMERGENCY MEDICINE

## 2021-12-03 PROCEDURE — 96372 THER/PROPH/DIAG INJ SC/IM: CPT

## 2021-12-03 PROCEDURE — 96375 TX/PRO/DX INJ NEW DRUG ADDON: CPT

## 2021-12-03 PROCEDURE — 99283 EMERGENCY DEPT VISIT LOW MDM: CPT

## 2021-12-03 RX ORDER — METHYLPREDNISOLONE SODIUM SUCCINATE 125 MG/2ML
125 INJECTION, POWDER, LYOPHILIZED, FOR SOLUTION INTRAMUSCULAR; INTRAVENOUS AS NEEDED
Status: CANCELLED | OUTPATIENT
Start: 2021-12-03

## 2021-12-03 RX ORDER — DIPHENHYDRAMINE HYDROCHLORIDE 50 MG/ML
50 INJECTION INTRAMUSCULAR; INTRAVENOUS ONCE AS NEEDED
Status: DISCONTINUED | OUTPATIENT
Start: 2021-12-03 | End: 2021-12-05 | Stop reason: HOSPADM

## 2021-12-03 RX ORDER — DIPHENHYDRAMINE HYDROCHLORIDE 50 MG/ML
50 INJECTION INTRAMUSCULAR; INTRAVENOUS ONCE AS NEEDED
Status: CANCELLED | OUTPATIENT
Start: 2021-12-03

## 2021-12-03 RX ORDER — DIPHENHYDRAMINE HCL 25 MG
50 CAPSULE ORAL ONCE AS NEEDED
Status: CANCELLED | OUTPATIENT
Start: 2021-12-03

## 2021-12-03 RX ORDER — METHYLPREDNISOLONE SODIUM SUCCINATE 125 MG/2ML
125 INJECTION, POWDER, LYOPHILIZED, FOR SOLUTION INTRAMUSCULAR; INTRAVENOUS AS NEEDED
Status: DISCONTINUED | OUTPATIENT
Start: 2021-12-03 | End: 2021-12-05 | Stop reason: HOSPADM

## 2021-12-03 RX ORDER — DIPHENHYDRAMINE HCL 25 MG
50 CAPSULE ORAL ONCE AS NEEDED
Status: DISCONTINUED | OUTPATIENT
Start: 2021-12-03 | End: 2021-12-05 | Stop reason: HOSPADM

## 2021-12-03 RX ORDER — EPINEPHRINE 1 MG/ML
0.3 INJECTION, SOLUTION, CONCENTRATE INTRAVENOUS AS NEEDED
Status: DISCONTINUED | OUTPATIENT
Start: 2021-12-03 | End: 2021-12-05 | Stop reason: HOSPADM

## 2021-12-03 RX ORDER — SODIUM CHLORIDE 9 MG/ML
30 INJECTION, SOLUTION INTRAVENOUS ONCE
Status: DISCONTINUED | OUTPATIENT
Start: 2021-12-03 | End: 2021-12-05 | Stop reason: HOSPADM

## 2021-12-03 RX ORDER — SODIUM CHLORIDE 9 MG/ML
30 INJECTION, SOLUTION INTRAVENOUS ONCE
Status: CANCELLED | OUTPATIENT
Start: 2021-12-03

## 2021-12-03 RX ORDER — EPINEPHRINE 1 MG/ML
0.3 INJECTION, SOLUTION, CONCENTRATE INTRAVENOUS AS NEEDED
Status: CANCELLED | OUTPATIENT
Start: 2021-12-03

## 2021-12-03 RX ADMIN — IMDEVIMAB 1200 MG: 300 INJECTION, SOLUTION, CONCENTRATE INTRAVENOUS at 08:00

## 2021-12-03 RX ADMIN — METHYLPREDNISOLONE SODIUM SUCCINATE 125 MG: 125 INJECTION, POWDER, FOR SOLUTION INTRAMUSCULAR; INTRAVENOUS at 08:14

## 2021-12-03 RX ADMIN — DIPHENHYDRAMINE HYDROCHLORIDE 50 MG: 50 INJECTION, SOLUTION INTRAMUSCULAR; INTRAVENOUS at 08:10

## 2021-12-03 RX ADMIN — EPINEPHRINE 0.3 MG: 1 INJECTION INTRAMUSCULAR; INTRAVENOUS; SUBCUTANEOUS at 08:15

## 2021-12-03 NOTE — ED PROVIDER NOTES
Subjective   History of Present Illness  Adverse reaction to infusion  46-year-old female was an outpatient area receiving monoclonal antibody infusion for her recent diagnosis of Covid when she started feeling some burning in her throat and chest and felt short of breath.  They stopped the infusion to give her epinephrine and Solu-Medrol and Benadryl.  Upon arrival here she is resting comfortably.  There is no apparent rash.  She reports no focal numbness or weakness.  She states she was diagnosed with Covid yesterday after having a couple of days of diffuse myalgias and some nausea  Review of Systems   Constitutional: Negative.    HENT: Negative.    Eyes: Negative.    Respiratory: Positive for chest tightness.    Gastrointestinal: Negative.    Genitourinary: Negative.    Musculoskeletal: Positive for myalgias.   Skin: Negative.    Neurological: Negative.    Psychiatric/Behavioral: Negative.        Past Medical History:   Diagnosis Date   • Anxiety    • COVID    • GERD (gastroesophageal reflux disease)    • Hypertension        Allergies   Allergen Reactions   • Butorphanol Unknown - High Severity     Slows breathing-unresponsive     • Drug [Casirivimab-Imdevimab] Other (See Comments)     Patient hot, nauseated, throat swelling, chest pain, headache,shaking uncontrollably   • Hydromorphone Hcl Unknown - High Severity     Slows breathing -unresponsive   • Nalbuphine Unknown - High Severity       Past Surgical History:   Procedure Laterality Date   •  SECTION      X2   • CHOLECYSTECTOMY     • HERNIA REPAIR      X3   • HYSTERECTOMY     • RECTAL POLYPECTOMY         Family History   Problem Relation Age of Onset   • Heart disease Mother    • Heart disease Father        Social History     Socioeconomic History   • Marital status:    Tobacco Use   • Smoking status: Never Smoker   • Smokeless tobacco: Never Used   Substance and Sexual Activity   • Alcohol use: Yes     Comment: SOCIAL   • Drug use: Never   •  "Sexual activity: Yes       Prior to Admission medications    Medication Sig Start Date End Date Taking? Authorizing Provider   acetaminophen-codeine (TYLENOL #3) 300-30 MG per tablet Take 1 tablet by mouth 3 (Three) Times a Day As Needed for Moderate Pain . 12/2/21   Rupa Viera MD   atorvastatin (LIPITOR) 40 MG tablet Take 1 tablet by mouth once daily 11/21/21   Kathia Cooper MD   escitalopram (Lexapro) 20 MG tablet Take 1 tablet by mouth Daily. 8/20/21   Kathia Cooper MD   lansoprazole (PREVACID) 15 MG capsule Take 15 mg by mouth Daily. 9/22/17   Emergency, Nurse CARMEN Rivas   lisinopril (PRINIVIL,ZESTRIL) 10 MG tablet Take 1 tablet by mouth Daily. 11/10/21   Kathia Cooper MD   methylPREDNISolone (Medrol) 4 MG dose pack Take as directed on package instructions. 12/1/21   Rupa Viera MD   Multiple Vitamins-Minerals (MULTIVITAMIN ADULT PO) Take 1 tablet by mouth Daily.    Emergency, Nurse CARMEN Rivas   tiZANidine (ZANAFLEX) 2 MG tablet Take 1 tablet by mouth 2 (Two) Times a Day As Needed for Muscle Spasms. 12/1/21   Rupa Viera MD     /87   Pulse 106   Temp 97.8 °F (36.6 °C) (Oral)   Resp 18   Ht 152.4 cm (60\")   Wt 71.2 kg (157 lb)   LMP  (LMP Unknown)   SpO2 97%   Breastfeeding No   BMI 30.66 kg/m²   I examined the patient using the appropriate personal protective equipment.        Objective   Physical Exam  General: Well-developed well-appearing, no acute distress, alert and appropriate  Eyes: Conjunctiva normal  HEENT: Mucous membranes moist, no mucosal swelling  Neck: Supple, no nuchal rigidity, no soft tissue swelling, no stridor  Respirations: Respirations nonlabored, equal breath sounds bilaterally, clear lungs, no wheezing  Heart regular rate and rhythm, no murmurs rubs or gallops,   Abdomen soft nontender nondistended,   Extremities no clubbing cyanosis or edema, calves are symmetric and nontender  Neuro cranial nerves grossly intact, no focal limb deficits  Psych " oriented, pleasant affect  Skin no rash, brisk cap refill  Procedures           ED Course         My EKG interpretation sinus rhythm, rate of 83, no acute ST or T wave abnormalities                              Results for orders placed or performed during the hospital encounter of 12/03/21   Basic Metabolic Panel    Specimen: Blood   Result Value Ref Range    Glucose 132 (H) 65 - 99 mg/dL    BUN 10 6 - 20 mg/dL    Creatinine 0.60 0.57 - 1.00 mg/dL    Sodium 138 136 - 145 mmol/L    Potassium 3.3 (L) 3.5 - 5.2 mmol/L    Chloride 103 98 - 107 mmol/L    CO2 20.0 (L) 22.0 - 29.0 mmol/L    Calcium 9.0 8.6 - 10.5 mg/dL    eGFR Non African Amer 107 >60 mL/min/1.73    BUN/Creatinine Ratio 16.7 7.0 - 25.0    Anion Gap 15.0 5.0 - 15.0 mmol/L   CBC Auto Differential    Specimen: Blood   Result Value Ref Range    WBC 12.60 (H) 3.40 - 10.80 10*3/mm3    RBC 4.39 3.77 - 5.28 10*6/mm3    Hemoglobin 14.8 12.0 - 15.9 g/dL    Hematocrit 41.0 34.0 - 46.6 %    MCV 93.4 79.0 - 97.0 fL    MCH 33.7 (H) 26.6 - 33.0 pg    MCHC 36.1 (H) 31.5 - 35.7 g/dL    RDW 13.0 12.3 - 15.4 %    RDW-SD 42.4 37.0 - 54.0 fl    MPV 8.5 6.0 - 12.0 fL    Platelets 307 140 - 450 10*3/mm3    Neutrophil % 40.2 (L) 42.7 - 76.0 %    Lymphocyte % 50.1 (H) 19.6 - 45.3 %    Monocyte % 4.2 (L) 5.0 - 12.0 %    Eosinophil % 4.8 0.3 - 6.2 %    Basophil % 0.7 0.0 - 1.5 %    Neutrophils, Absolute 5.10 1.70 - 7.00 10*3/mm3    Lymphocytes, Absolute 6.30 (H) 0.70 - 3.10 10*3/mm3    Monocytes, Absolute 0.50 0.10 - 0.90 10*3/mm3    Eosinophils, Absolute 0.60 (H) 0.00 - 0.40 10*3/mm3    Basophils, Absolute 0.10 0.00 - 0.20 10*3/mm3    nRBC 0.1 0.0 - 0.2 /100 WBC   Gold Top - SST   Result Value Ref Range    Extra Tube Hold for add-ons.      CT Head Without Contrast    Result Date: 12/1/2021   1. No acute findings. 2. Chronic paranasal sinus disease.  Electronically Signed By-Diomedes Mustafa MD On:12/1/2021 4:43 PM This report was finalized on [id number] by  Diomedes Mustafa MD.    XR  Chest 1 View    Result Date: 12/3/2021  No evidence of acute cardiopulmonary disease.  Electronically Signed By-Bryce Law On:12/3/2021 9:35 AM This report was finalized on [id number] by  Bryce Law, .                 MDM  Pain remained stable throughout the emergency room course.  She has no sign of anaphylaxis.  Oxygen levels are 97% on room air she is not in respiratory distress.  She was discharged in good condition after period of observation.  She was advised to continue home quarantine.  She was given warning signs for return.  Final diagnoses:   COVID   Non-dose-related adverse effect of medication, initial encounter       ED Disposition  ED Disposition     ED Disposition Condition Comment    Discharge Stable           Kathia Cooper MD  [address] [place] [id number] [phone]      As needed         Medication List      No changes were made to your prescriptions during this visit.          Dangelo Nugent MD  12/03/21 0208

## 2021-12-03 NOTE — DISCHARGE INSTRUCTIONS
Rest, drink plenty fluids, return for increased shortness of breath, persistent vomiting or any other concerns.  Continue home quarantine

## 2021-12-03 NOTE — TELEPHONE ENCOUNTER
Patient called and stated she went for the monoclonal antibody infusion @ [facility] and had a severe reaction and was sent to the ER. Patient just wanted you to know

## 2021-12-03 NOTE — CODE DOCUMENTATION
Patient here for [facility] Infusion.  5 minutes into infusion, patient started feeling hot and nauseated, stated throat feels funny.  Stopped infusion, hung normal saline wide open, gave 50 mg IV Benadryl.  Oxygen saturation's continue to stay in the high 90's, blood pressure increasing.  Patient started complaining of chest pain, but states throat feeling better.  Gave patient solumedrol 125 mg IV push.  While giving, patient stated throat feels like closing again, fast team called, gave Epinephrine 0.3 mg IM, fast team arrived.  Gave report to Tahira MORALES and she decided to take patient down to the emergency room.  Tahira said I did not need to go down with the patient.  Fast team transported patient to the emergency room via wheelchair.  Went out and spoke to patient's  to let him know what occurred and to let him know to go to the emergency room.

## 2021-12-13 ENCOUNTER — OFFICE VISIT (OUTPATIENT)
Dept: FAMILY MEDICINE CLINIC | Facility: CLINIC | Age: 46
End: 2021-12-13

## 2021-12-13 VITALS
HEART RATE: 96 BPM | DIASTOLIC BLOOD PRESSURE: 91 MMHG | TEMPERATURE: 98.2 F | BODY MASS INDEX: 29.69 KG/M2 | WEIGHT: 152 LBS | SYSTOLIC BLOOD PRESSURE: 146 MMHG | OXYGEN SATURATION: 97 %

## 2021-12-13 DIAGNOSIS — M79.10 MYALGIA: ICD-10-CM

## 2021-12-13 DIAGNOSIS — U07.1 CLINICAL DIAGNOSIS OF COVID-19: Primary | ICD-10-CM

## 2021-12-13 PROCEDURE — 99213 OFFICE O/P EST LOW 20 MIN: CPT | Performed by: FAMILY MEDICINE

## 2021-12-13 RX ORDER — PREDNISONE 10 MG/1
10 TABLET ORAL 2 TIMES DAILY
Qty: 20 TABLET | Refills: 0 | Status: SHIPPED | OUTPATIENT
Start: 2021-12-13 | End: 2022-05-04

## 2021-12-13 RX ORDER — CYCLOBENZAPRINE HCL 10 MG
10 TABLET ORAL 3 TIMES DAILY PRN
Qty: 30 TABLET | Refills: 0 | Status: ON HOLD | OUTPATIENT
Start: 2021-12-13 | End: 2022-07-01

## 2021-12-13 NOTE — PROGRESS NOTES
Chief Complaint  Back Pain (rt back side, pain is worse and spread to shoulders to head )    Subjective          Evelyn Garcia presents to [facility]  She started feeling bad on 11/25/21.  She saw Dr. Viera for headache, nausea, neck pain, mid back ache.  She was treated with Medrol and muscle relaxer but they did not help much. She tried Tyl #3 but had itching and felt worse so she stopped that.  She had a COVID test that was positive on 12/1/21.  She had iv monoclonal antibody infusion on 12/3/21 but a few minutes in she had a reaction to the infusion. Heaviness in her chest and back, nausea.  Tylenol helps a little.  Advil hurts her stomach.   Currently she has severe pain in shoulders, arms, head, neck and back.  Trouble sleeping due to the pain.      Back Pain        Objective   Vital Signs:   /91 (BP Location: Left arm, Patient Position: Sitting, Cuff Size: Adult)   Pulse 96   Temp 98.2 °F (36.8 °C) (Infrared)   Wt 68.9 kg (152 lb)   SpO2 97%   BMI 29.69 kg/m²     Physical Exam  Vitals and nursing note reviewed.   Constitutional:       General: She is not in acute distress.     Appearance: She is well-developed.   HENT:      Head: Normocephalic.   Eyes:      General: Lids are normal.      Conjunctiva/sclera: Conjunctivae normal.   Neck:      Thyroid: No thyroid mass or thyromegaly.      Trachea: Trachea normal.   Cardiovascular:      Rate and Rhythm: Normal rate and regular rhythm.      Heart sounds: Normal heart sounds.   Pulmonary:      Effort: Pulmonary effort is normal.      Breath sounds: Normal breath sounds.   Musculoskeletal:         General: Tenderness present.      Cervical back: Normal range of motion.      Thoracic back: Spasms and tenderness present.      Lumbar back: Spasms and tenderness present.   Lymphadenopathy:      Cervical: No cervical adenopathy.   Skin:     General: Skin is warm and dry.   Neurological:      Mental Status: She is alert and  oriented to person, place, and time.   Psychiatric:         Attention and Perception: She is attentive.         Mood and Affect: Mood normal.         Speech: Speech normal.         Behavior: Behavior normal.        Result Review :   The following data was reviewed by: Kathia Cooper MD on 12/13/2021:  Common labs    Common Labsle 11/10/21 11/10/21 11/10/21 12/1/21 12/1/21 12/3/21 12/3/21    1011 1011 1011 1453 1453 0843 0843   Glucose   100 (A)  106 (A)  132 (A)   BUN   10  13  10   Creatinine   0.64  0.69  0.60   eGFR Non  Am   99  91  107   Sodium   140  141  138   Potassium   4.2  4.0  3.3 (A)   Chloride   105  103  103   Calcium   9.8  9.6  9.0   Albumin   4.60       Total Bilirubin   0.3       Alkaline Phosphatase   31 (A)       AST (SGOT)   18       ALT (SGPT)   28       WBC    10.28  12.60 (A)    Hemoglobin    14.8  14.8    Hematocrit    42.3  41.0    Platelets    322  307    Total Cholesterol  192        Triglycerides  163 (A)        HDL Cholesterol  45        LDL Cholesterol   118 (A)        Hemoglobin A1C 5.4         (A) Abnormal value                      Assessment and Plan    Diagnoses and all orders for this visit:    1. Clinical diagnosis of COVID-19 (Primary)    2. Myalgia  -     predniSONE (DELTASONE) 10 MG tablet; Take 1 tablet by mouth 2 (Two) Times a Day.  Dispense: 20 tablet; Refill: 0  -     cyclobenzaprine (FLEXERIL) 10 MG tablet; Take 1 tablet by mouth 3 (Three) Times a Day As Needed for Muscle Spasms.  Dispense: 30 tablet; Refill: 0        Follow Up   No follow-ups on file.  Patient was given instructions and counseling regarding her condition or for health maintenance advice. Please see specific information pulled into the AVS if appropriate.

## 2021-12-27 ENCOUNTER — PATIENT MESSAGE (OUTPATIENT)
Dept: FAMILY MEDICINE CLINIC | Facility: CLINIC | Age: 46
End: 2021-12-27

## 2021-12-29 RX ORDER — GABAPENTIN 300 MG/1
300 CAPSULE ORAL 2 TIMES DAILY
Qty: 60 CAPSULE | Refills: 0 | Status: ON HOLD | OUTPATIENT
Start: 2021-12-29 | End: 2022-07-01

## 2022-03-04 RX ORDER — ESCITALOPRAM OXALATE 20 MG/1
TABLET ORAL
Qty: 90 TABLET | Refills: 0 | Status: ON HOLD | OUTPATIENT
Start: 2022-03-04 | End: 2022-07-01

## 2022-05-04 ENCOUNTER — OFFICE VISIT (OUTPATIENT)
Dept: FAMILY MEDICINE CLINIC | Facility: CLINIC | Age: 47
End: 2022-05-04

## 2022-05-04 VITALS
HEART RATE: 74 BPM | TEMPERATURE: 97.7 F | DIASTOLIC BLOOD PRESSURE: 81 MMHG | BODY MASS INDEX: 31.29 KG/M2 | OXYGEN SATURATION: 96 % | RESPIRATION RATE: 16 BRPM | WEIGHT: 159.4 LBS | HEIGHT: 60 IN | SYSTOLIC BLOOD PRESSURE: 120 MMHG

## 2022-05-04 DIAGNOSIS — R11.2 NAUSEA AND VOMITING, UNSPECIFIED VOMITING TYPE: ICD-10-CM

## 2022-05-04 DIAGNOSIS — R10.13 EPIGASTRIC PAIN: Primary | ICD-10-CM

## 2022-05-04 PROCEDURE — 99213 OFFICE O/P EST LOW 20 MIN: CPT | Performed by: FAMILY MEDICINE

## 2022-05-04 RX ORDER — SUCRALFATE 1 G/1
1 TABLET ORAL 4 TIMES DAILY
Qty: 120 TABLET | Refills: 0 | Status: ON HOLD | OUTPATIENT
Start: 2022-05-04 | End: 2022-07-01

## 2022-05-04 NOTE — PROGRESS NOTES
"Chief Complaint  Hiatal Hernia, mesh issues, and hormones    Subjective          Evelyn Garcia presents to [facility]  About 10 days ago she woke up from sleep with mid chest pain.  She went to [facility] in [place] and had an evaluation.  Heart disease ruled out.  Still having some pain, nausea, diarrhea, heaviness in her upper abdomen.   She has had 3 umbilical hernia repairs and cholecystectomy.      Hiatal Hernia        Objective   Vital Signs:   /81 (BP Location: Left arm, Patient Position: Sitting, Cuff Size: Adult)   Pulse 74   Temp 97.7 °F (36.5 °C) (Temporal)   Resp 16   Ht 152.4 cm (60\")   Wt 72.3 kg (159 lb 6.4 oz)   SpO2 96%   BMI 31.13 kg/m²     Physical Exam  Vitals and nursing note reviewed.   Constitutional:       General: She is not in acute distress.     Appearance: She is well-developed.   HENT:      Head: Normocephalic.   Eyes:      General: Lids are normal.      Conjunctiva/sclera: Conjunctivae normal.   Neck:      Thyroid: No thyroid mass or thyromegaly.      Trachea: Trachea normal.   Cardiovascular:      Rate and Rhythm: Normal rate and regular rhythm.      Heart sounds: Normal heart sounds.   Pulmonary:      Effort: Pulmonary effort is normal.      Breath sounds: Normal breath sounds.   Abdominal:      Palpations: Abdomen is soft.      Tenderness: There is abdominal tenderness in the epigastric area.   Musculoskeletal:      Cervical back: Normal range of motion.   Lymphadenopathy:      Cervical: No cervical adenopathy.   Skin:     General: Skin is warm and dry.   Neurological:      Mental Status: She is alert and oriented to person, place, and time.   Psychiatric:         Attention and Perception: She is attentive.         Mood and Affect: Mood normal.         Speech: Speech normal.         Behavior: Behavior normal.        Result Review :   The following data was reviewed by: Kathia Cooper MD on 05/04/2022:  Common labs    Common Labsle 11/10/21 " 11/10/21 11/10/21 12/1/21 12/1/21 12/3/21 12/3/21    1011 1011 1011 1453 1453 0843 0843   Glucose   100 (A)  106 (A)  132 (A)   BUN   10  13  10   Creatinine   0.64  0.69  0.60   eGFR Non  Am   99  91  107   Sodium   140  141  138   Potassium   4.2  4.0  3.3 (A)   Chloride   105  103  103   Calcium   9.8  9.6  9.0   Albumin   4.60       Total Bilirubin   0.3       Alkaline Phosphatase   31 (A)       AST (SGOT)   18       ALT (SGPT)   28       WBC    10.28  12.60 (A)    Hemoglobin    14.8  14.8    Hematocrit    42.3  41.0    Platelets    322  307    Total Cholesterol  192        Triglycerides  163 (A)        HDL Cholesterol  45        LDL Cholesterol   118 (A)        Hemoglobin A1C 5.4         (A) Abnormal value                      Assessment and Plan    Diagnoses and all orders for this visit:    1. Epigastric pain (Primary)  -     Ambulatory referral for Screening EGD    2. Nausea and vomiting, unspecified vomiting type  -     Ambulatory referral for Screening EGD    Other orders  -     sucralfate (Carafate) 1 g tablet; Take 1 tablet by mouth 4 (Four) Times a Day.  Dispense: 120 tablet; Refill: 0      BMI is >= 30 and <= 34.9 (Class 1 obesity). The following options were offered after discussion: exercise counseling/recommendations         Follow Up   No follow-ups on file.  Patient was given instructions and counseling regarding her condition or for health maintenance advice. Please see specific information pulled into the AVS if appropriate.

## 2022-05-23 ENCOUNTER — TELEPHONE (OUTPATIENT)
Dept: FAMILY MEDICINE CLINIC | Facility: CLINIC | Age: 47
End: 2022-05-23

## 2022-05-23 NOTE — TELEPHONE ENCOUNTER
PT CALLED IN INQUIRING ABOUT HER NOT TO GO BACK TO WORK. I SAW THE CONVERSATION IN HER CHART BETWEEN YOU GUYS, BUT DIDN'T SEE A NOTE.    THANKS

## 2022-05-25 ENCOUNTER — DOCUMENTATION (OUTPATIENT)
Dept: FAMILY MEDICINE CLINIC | Facility: CLINIC | Age: 47
End: 2022-05-25

## 2022-05-25 ENCOUNTER — TELEPHONE (OUTPATIENT)
Dept: FAMILY MEDICINE CLINIC | Facility: CLINIC | Age: 47
End: 2022-05-25

## 2022-05-25 NOTE — TELEPHONE ENCOUNTER
"PATIENT CALLED ABOUT HER WORK LETTER. IT NEEDS TO STATES \"SHE HAS BEEN RELEASED TO RETURN TO WORK\" NEEDS TO BE ADDED TO LETTER    RESEND TO MY CHART  "

## 2022-07-01 ENCOUNTER — HOSPITAL ENCOUNTER (OUTPATIENT)
Facility: HOSPITAL | Age: 47
Discharge: HOME | End: 2022-07-03
Attending: EMERGENCY MEDICINE | Admitting: INTERNAL MEDICINE

## 2022-07-01 ENCOUNTER — APPOINTMENT (OUTPATIENT)
Dept: GENERAL RADIOLOGY | Facility: HOSPITAL | Age: 47
End: 2022-07-01

## 2022-07-01 ENCOUNTER — APPOINTMENT (OUTPATIENT)
Dept: CT IMAGING | Facility: HOSPITAL | Age: 47
End: 2022-07-01

## 2022-07-01 DIAGNOSIS — R11.0 NAUSEA: ICD-10-CM

## 2022-07-01 DIAGNOSIS — R42 DIZZINESS: ICD-10-CM

## 2022-07-01 DIAGNOSIS — R07.9 CHEST PAIN, UNSPECIFIED TYPE: Primary | ICD-10-CM

## 2022-07-01 DIAGNOSIS — I20.8 STABLE ANGINA PECTORIS: ICD-10-CM

## 2022-07-01 LAB
ALBUMIN SERPL-MCNC: 4.5 G/DL (ref 3.5–5.2)
ALBUMIN/GLOB SERPL: 1.7 G/DL
ALP SERPL-CCNC: 35 U/L (ref 39–117)
ALT SERPL W P-5'-P-CCNC: 29 U/L (ref 1–33)
ANION GAP SERPL CALCULATED.3IONS-SCNC: 13 MMOL/L (ref 5–15)
AST SERPL-CCNC: 19 U/L (ref 1–32)
B PARAPERT DNA SPEC QL NAA+PROBE: NOT DETECTED
B PERT DNA SPEC QL NAA+PROBE: NOT DETECTED
BACTERIA UR QL AUTO: ABNORMAL /HPF
BASOPHILS # BLD AUTO: 0.1 10*3/MM3 (ref 0–0.2)
BASOPHILS NFR BLD AUTO: 0.9 % (ref 0–1.5)
BILIRUB SERPL-MCNC: 0.4 MG/DL (ref 0–1.2)
BILIRUB UR QL STRIP: NEGATIVE
BUN SERPL-MCNC: 13 MG/DL (ref 6–20)
BUN/CREAT SERPL: 25.5 (ref 7–25)
C PNEUM DNA NPH QL NAA+NON-PROBE: NOT DETECTED
CALCIUM SPEC-SCNC: 9.7 MG/DL (ref 8.6–10.5)
CHLORIDE SERPL-SCNC: 102 MMOL/L (ref 98–107)
CLARITY UR: CLEAR
CO2 SERPL-SCNC: 22 MMOL/L (ref 22–29)
COLOR UR: YELLOW
CREAT SERPL-MCNC: 0.51 MG/DL (ref 0.57–1)
D DIMER PPP FEU-MCNC: 0.46 MG/L (FEU) (ref 0–0.59)
DEPRECATED RDW RBC AUTO: 41.1 FL (ref 37–54)
EGFRCR SERPLBLD CKD-EPI 2021: 115.3 ML/MIN/1.73
EOSINOPHIL # BLD AUTO: 0.5 10*3/MM3 (ref 0–0.4)
EOSINOPHIL NFR BLD AUTO: 5.2 % (ref 0.3–6.2)
ERYTHROCYTE [DISTWIDTH] IN BLOOD BY AUTOMATED COUNT: 12.6 % (ref 12.3–15.4)
FLUAV SUBTYP SPEC NAA+PROBE: NOT DETECTED
FLUBV RNA ISLT QL NAA+PROBE: NOT DETECTED
GLOBULIN UR ELPH-MCNC: 2.6 GM/DL
GLUCOSE SERPL-MCNC: 95 MG/DL (ref 65–99)
GLUCOSE UR STRIP-MCNC: NEGATIVE MG/DL
HADV DNA SPEC NAA+PROBE: NOT DETECTED
HCOV 229E RNA SPEC QL NAA+PROBE: NOT DETECTED
HCOV HKU1 RNA SPEC QL NAA+PROBE: NOT DETECTED
HCOV NL63 RNA SPEC QL NAA+PROBE: NOT DETECTED
HCOV OC43 RNA SPEC QL NAA+PROBE: NOT DETECTED
HCT VFR BLD AUTO: 40.6 % (ref 34–46.6)
HGB BLD-MCNC: 14.1 G/DL (ref 12–15.9)
HGB UR QL STRIP.AUTO: ABNORMAL
HMPV RNA NPH QL NAA+NON-PROBE: NOT DETECTED
HPIV1 RNA ISLT QL NAA+PROBE: NOT DETECTED
HPIV2 RNA SPEC QL NAA+PROBE: NOT DETECTED
HPIV3 RNA NPH QL NAA+PROBE: NOT DETECTED
HPIV4 P GENE NPH QL NAA+PROBE: NOT DETECTED
HYALINE CASTS UR QL AUTO: ABNORMAL /LPF
KETONES UR QL STRIP: NEGATIVE
LEUKOCYTE ESTERASE UR QL STRIP.AUTO: NEGATIVE
LIPASE SERPL-CCNC: 33 U/L (ref 13–60)
LYMPHOCYTES # BLD AUTO: 3.4 10*3/MM3 (ref 0.7–3.1)
LYMPHOCYTES NFR BLD AUTO: 36.1 % (ref 19.6–45.3)
M PNEUMO IGG SER IA-ACNC: NOT DETECTED
MAGNESIUM SERPL-MCNC: 2.2 MG/DL (ref 1.6–2.6)
MCH RBC QN AUTO: 32.8 PG (ref 26.6–33)
MCHC RBC AUTO-ENTMCNC: 34.7 G/DL (ref 31.5–35.7)
MCV RBC AUTO: 94.7 FL (ref 79–97)
MONOCYTES # BLD AUTO: 0.7 10*3/MM3 (ref 0.1–0.9)
MONOCYTES NFR BLD AUTO: 7 % (ref 5–12)
NEUTROPHILS NFR BLD AUTO: 4.8 10*3/MM3 (ref 1.7–7)
NEUTROPHILS NFR BLD AUTO: 50.8 % (ref 42.7–76)
NITRITE UR QL STRIP: NEGATIVE
NRBC BLD AUTO-RTO: 0 /100 WBC (ref 0–0.2)
NT-PROBNP SERPL-MCNC: 22.2 PG/ML (ref 0–450)
PH UR STRIP.AUTO: 6 [PH] (ref 5–8)
PLATELET # BLD AUTO: 293 10*3/MM3 (ref 140–450)
PMV BLD AUTO: 8.6 FL (ref 6–12)
POTASSIUM SERPL-SCNC: 4.3 MMOL/L (ref 3.5–5.2)
PROT SERPL-MCNC: 7.1 G/DL (ref 6–8.5)
PROT UR QL STRIP: NEGATIVE
RBC # BLD AUTO: 4.29 10*6/MM3 (ref 3.77–5.28)
RBC # UR STRIP: ABNORMAL /HPF
REF LAB TEST METHOD: ABNORMAL
RHINOVIRUS RNA SPEC NAA+PROBE: NOT DETECTED
RSV RNA NPH QL NAA+NON-PROBE: NOT DETECTED
SARS-COV-2 RNA NPH QL NAA+NON-PROBE: NOT DETECTED
SARS-COV-2 RNA PNL SPEC NAA+PROBE: NOT DETECTED
SODIUM SERPL-SCNC: 137 MMOL/L (ref 136–145)
SP GR UR STRIP: 1.01 (ref 1–1.03)
SQUAMOUS #/AREA URNS HPF: ABNORMAL /HPF
TROPONIN T SERPL-MCNC: <0.01 NG/ML (ref 0–0.03)
UROBILINOGEN UR QL STRIP: ABNORMAL
WBC # UR STRIP: ABNORMAL /HPF
WBC NRBC COR # BLD: 9.4 10*3/MM3 (ref 3.4–10.8)

## 2022-07-01 PROCEDURE — 83880 ASSAY OF NATRIURETIC PEPTIDE: CPT

## 2022-07-01 PROCEDURE — 70450 CT HEAD/BRAIN W/O DYE: CPT

## 2022-07-01 PROCEDURE — G0378 HOSPITAL OBSERVATION PER HR: HCPCS

## 2022-07-01 PROCEDURE — 85379 FIBRIN DEGRADATION QUANT: CPT

## 2022-07-01 PROCEDURE — 99284 EMERGENCY DEPT VISIT MOD MDM: CPT

## 2022-07-01 PROCEDURE — 81001 URINALYSIS AUTO W/SCOPE: CPT

## 2022-07-01 PROCEDURE — 85025 COMPLETE CBC W/AUTO DIFF WBC: CPT

## 2022-07-01 PROCEDURE — 83690 ASSAY OF LIPASE: CPT

## 2022-07-01 PROCEDURE — 96374 THER/PROPH/DIAG INJ IV PUSH: CPT

## 2022-07-01 PROCEDURE — 25010000002 KETOROLAC TROMETHAMINE PER 15 MG

## 2022-07-01 PROCEDURE — 83735 ASSAY OF MAGNESIUM: CPT

## 2022-07-01 PROCEDURE — 71045 X-RAY EXAM CHEST 1 VIEW: CPT

## 2022-07-01 PROCEDURE — 84484 ASSAY OF TROPONIN QUANT: CPT

## 2022-07-01 PROCEDURE — 93005 ELECTROCARDIOGRAM TRACING: CPT

## 2022-07-01 PROCEDURE — 80053 COMPREHEN METABOLIC PANEL: CPT

## 2022-07-01 PROCEDURE — 96375 TX/PRO/DX INJ NEW DRUG ADDON: CPT

## 2022-07-01 PROCEDURE — 25010000002 METOCLOPRAMIDE PER 10 MG

## 2022-07-01 PROCEDURE — 0202U NFCT DS 22 TRGT SARS-COV-2: CPT | Performed by: PHYSICIAN ASSISTANT

## 2022-07-01 RX ORDER — IBUPROFEN 400 MG/1
400 TABLET ORAL ONCE
Status: DISCONTINUED | OUTPATIENT
Start: 2022-07-01 | End: 2022-07-02

## 2022-07-01 RX ORDER — SODIUM CHLORIDE 0.9 % (FLUSH) 0.9 %
10 SYRINGE (ML) INJECTION AS NEEDED
Status: DISCONTINUED | OUTPATIENT
Start: 2022-07-01 | End: 2022-07-03 | Stop reason: HOSPADM

## 2022-07-01 RX ORDER — METOCLOPRAMIDE HYDROCHLORIDE 5 MG/ML
10 INJECTION INTRAMUSCULAR; INTRAVENOUS ONCE
Status: COMPLETED | OUTPATIENT
Start: 2022-07-01 | End: 2022-07-01

## 2022-07-01 RX ORDER — KETOROLAC TROMETHAMINE 15 MG/ML
15 INJECTION, SOLUTION INTRAMUSCULAR; INTRAVENOUS ONCE
Status: COMPLETED | OUTPATIENT
Start: 2022-07-01 | End: 2022-07-01

## 2022-07-01 RX ADMIN — SODIUM CHLORIDE 500 ML: 9 INJECTION, SOLUTION INTRAVENOUS at 11:31

## 2022-07-01 RX ADMIN — ALUMINA, MAGNESIA, AND SIMETHICONE: 2400; 2400; 240 SUSPENSION ORAL at 17:00

## 2022-07-01 RX ADMIN — KETOROLAC TROMETHAMINE 15 MG: 15 INJECTION, SOLUTION INTRAMUSCULAR; INTRAVENOUS at 11:32

## 2022-07-01 RX ADMIN — METOCLOPRAMIDE 10 MG: 5 INJECTION, SOLUTION INTRAMUSCULAR; INTRAVENOUS at 11:32

## 2022-07-01 NOTE — ED PROVIDER NOTES
Subjective   Chief Complaint: Chest pain    Context: Patient is a 47-year-old obese  female who presents today with complaints of midsternal chest pain, dizziness and nausea that began this morning.  She reports a history of GERD but does not believe that this pain feels like indigestion.  She states that pain occurs midsternal but radiates to her right chest.  She states earlier this morning it also radiated to her back but that has since resolved.  She rates the pain 5/10 and describes as a dull/heavy pain.  She cannot identify any relieving or aggravating factors.  She states she does not have any cardiac history but she has family history of cardiac issues in her mother and father.  She denies vomiting, shortness of breath, fever and dysuria.  She reports a history of hypertension, hypercholesteremia, and GERD.  She denies drug and alcohol use, as well as tobacco.    Duration: 4 hours    Timing: Waxes and wanes    Severity: 5/10    Associated: Dizziness, nausea          Review of Systems   Constitutional: Negative for appetite change, fatigue and fever.   HENT: Negative for congestion, rhinorrhea and sore throat.    Eyes: Negative.    Respiratory: Negative for cough, chest tightness and shortness of breath.    Cardiovascular: Positive for chest pain. Negative for palpitations.   Gastrointestinal: Positive for nausea. Negative for abdominal pain, diarrhea and vomiting.   Endocrine: Negative.    Genitourinary: Negative for dysuria, frequency and urgency.   Musculoskeletal: Negative for arthralgias and myalgias.   Skin: Negative.    Allergic/Immunologic: Negative.    Neurological: Positive for dizziness. Negative for syncope, weakness and headaches.   Hematological: Negative.    Psychiatric/Behavioral: Negative.    All other systems reviewed and are negative.      Past Medical History:   Diagnosis Date   • Anxiety    • COVID    • GERD (gastroesophageal reflux disease)    • Hypertension        Allergies    Allergen Reactions   • Butorphanol Unknown - High Severity     Slows breathing-unresponsive     • Drug [Casirivimab-Imdevimab] Other (See Comments)     Patient hot, nauseated, throat swelling, chest pain, headache,shaking uncontrollably   • Hydromorphone Hcl Unknown - High Severity     Slows breathing -unresponsive   • Monoclonal Antibodies Anaphylaxis     Patient had monoclonal antibody infusion and stated she was sent to er with reaction   • Nalbuphine Unknown - High Severity   • Latex, Natural Rubber Hives       Past Surgical History:   Procedure Laterality Date   •  SECTION      X2   • CHOLECYSTECTOMY     • HERNIA REPAIR      X3   • HYSTERECTOMY     • RECTAL POLYPECTOMY         Family History   Problem Relation Age of Onset   • Heart disease Mother    • Heart disease Father        Social History     Socioeconomic History   • Marital status:    Tobacco Use   • Smoking status: Never Smoker   • Smokeless tobacco: Never Used   Substance and Sexual Activity   • Alcohol use: Yes     Comment: SOCIAL   • Drug use: Never   • Sexual activity: Yes           Objective   Physical Exam  Vitals and nursing note reviewed.   Constitutional:       General: She is not in acute distress.     Appearance: Normal appearance. She is well-developed. She is obese. She is not ill-appearing or diaphoretic.   HENT:      Head: Normocephalic and atraumatic.   Eyes:      Extraocular Movements: Extraocular movements intact.      Pupils: Pupils are equal, round, and reactive to light.   Cardiovascular:      Rate and Rhythm: Normal rate and regular rhythm.      Pulses: Normal pulses.      Heart sounds: Normal heart sounds. No murmur heard.  Pulmonary:      Effort: Pulmonary effort is normal.      Breath sounds: Normal breath sounds.   Chest:      Chest wall: No mass or tenderness.   Abdominal:      General: Bowel sounds are normal.      Palpations: Abdomen is soft.   Musculoskeletal:         General: Normal range of motion.       "Cervical back: Normal range of motion and neck supple.      Right lower leg: No edema.      Left lower leg: No edema.   Skin:     General: Skin is warm and dry.      Capillary Refill: Capillary refill takes less than 2 seconds.   Neurological:      General: No focal deficit present.      Mental Status: She is alert and oriented to person, place, and time. Mental status is at baseline.      GCS: GCS eye subscore is 4. GCS verbal subscore is 5. GCS motor subscore is 6.      Cranial Nerves: Cranial nerves are intact.      Sensory: Sensation is intact.      Deep Tendon Reflexes: Reflexes are normal and symmetric.   Psychiatric:         Mood and Affect: Mood normal.         Behavior: Behavior normal.         Procedures                 ED Course  ED Course as of 07/01/22 1310   Fri Jul 01, 2022   1104 EKG was reviewed by myself and read by Dr. Avitia.  It shows sinus rhythm with rate of 79.  No ectopy or ST elevation. [SJ]      ED Course User Index  [SJ] Akua Badillo, APRN      /84 (BP Location: Right arm, Patient Position: Lying)   Pulse 93   Temp 97.5 °F (36.4 °C)   Resp 16   Ht 152.4 cm (60\")   Wt 70.8 kg (156 lb)   LMP  (LMP Unknown)   SpO2 98%   BMI 30.47 kg/m²   Labs Reviewed   COMPREHENSIVE METABOLIC PANEL - Abnormal; Notable for the following components:       Result Value    Creatinine 0.51 (*)     Alkaline Phosphatase 35 (*)     BUN/Creatinine Ratio 25.5 (*)     All other components within normal limits    Narrative:     GFR Normal >60  Chronic Kidney Disease <60  Kidney Failure <15     URINALYSIS W/ MICROSCOPIC IF INDICATED (NO CULTURE) - Abnormal; Notable for the following components:    Blood, UA Small (1+) (*)     All other components within normal limits   CBC WITH AUTO DIFFERENTIAL - Abnormal; Notable for the following components:    Lymphocytes, Absolute 3.40 (*)     Eosinophils, Absolute 0.50 (*)     All other components within normal limits   URINALYSIS, MICROSCOPIC ONLY - Abnormal; " Notable for the following components:    RBC, UA 0-2 (*)     WBC, UA 0-2 (*)     All other components within normal limits   LIPASE - Normal   TROPONIN (IN-HOUSE) - Normal    Narrative:     Troponin T Reference Range:  <= 0.03 ng/mL-   Negative for AMI  >0.03 ng/mL-     Abnormal for myocardial necrosis.  Clinicians would have to utilize clinical acumen, EKG, Troponin and serial changes to determine if it is an Acute Myocardial Infarction or myocardial injury due to an underlying chronic condition.       Results may be falsely decreased if patient taking Biotin.     BNP (IN-HOUSE) - Normal    Narrative:     Among patients with dyspnea, NT-proBNP is highly sensitive for the detection of acute congestive heart failure. In addition NT-proBNP of <300 pg/ml effectively rules out acute congestive heart failure with 99% negative predictive value.    Results may be falsely decreased if patient taking Biotin.     D-DIMER, QUANTITATIVE - Normal    Narrative:     Reference Range  --------------------------------------------------------------------     < 0.50   Negative Predictive Value  0.50-0.59   Indeterminate    >= 0.60   Probable VTE             A very low percentage of patients with DVT may yield D-Dimer results   below the cut-off of 0.50 mg/L FEU.  This is known to be more   prevalent in patients with distal DVT.             Results of this test should always be interpreted in conjunction with   the patient's medical history, clinical presentation and other   findings.  Clinical diagnosis should not be based on the result of   INNOVANCE D-Dimer alone.   MAGNESIUM - Normal   CBC AND DIFFERENTIAL    Narrative:     The following orders were created for panel order CBC & Differential.  Procedure                               Abnormality         Status                     ---------                               -----------         ------                     CBC Auto Differential[[id number]]        Abnormal            Final  result                 Please view results for these tests on the individual orders.     Medications   sodium chloride 0.9 % flush 10 mL (has no administration in time range)   sodium chloride 0.9 % bolus 500 mL (500 mL Intravenous New Bag 7/1/22 1131)   metoclopramide (REGLAN) injection 10 mg (10 mg Intravenous Given 7/1/22 1132)   ketorolac (TORADOL) injection 15 mg (15 mg Intravenous Given 7/1/22 1132)     CT Head Without Contrast    Result Date: 7/1/2022  No acute intracranial abnormality.  Chronic paranasal sinus disease  Electronically Signed By-Moo Arroyo On:7/1/2022 12:49 PM This report was finalized on [id number] by  Moo Arroyo, .    XR Chest 1 View    Result Date: 7/1/2022  No acute cardiopulmonary process.  Electronically Signed By-Susi Agarwal MD On:7/1/2022 11:35 AM This report was finalized on [id number] by  Susi Agarwal MD.                    HEART Score: 3                      MDM  Number of Diagnoses or Management Options  Chest pain, unspecified type  Dizziness  Nausea  Diagnosis management comments: Patient was placed in a gown prior to assessment.  She is alert, nontoxic-appearing and stable upon exam.  She is afebrile and not currently requiring oxygen.  An IV was established and labs were obtained.  She was medicated with Reglan, Toradol and received a fluid bolus.  Imaging was ordered.    Chart Review: Chart review reveals history of GERD, hyperlipidemia, hypertension, anxiety and sciatica.  Patient's last ER visit was December third 2021 when she was diagnosed with COVID.  It appears patient's last cardiac work-up was in May 2020.    Comorbidities: Family history of cardiac issues, GERD, hyperlipidemia, hypertension, anxiety  Differentials: Anxiety, cardiac ischemia, GERD.  Not all inclusive of differentials considered.   Discussion with Provider: Dr. Avitia  EKG reviewed by me and interpreted by Dr. Avitia    Lab Interpretation: As above  Radiology Interpretation: As  above    Appropriate PPE worn during exam.    Patient has had a grossly unremarkable work-up in the ER today.  Her troponin is negative.  Head CT is negative.  Blood work is unremarkable and urine is negative for infection.  Upon reassessment, patient is alert, stable and nontoxic-appearing.  She reports improvement in symptoms after fluid bolus and medication.  I discussed the findings with patient and advised her to stay for further evaluation of chest pain.  Patient has agreed to stay for cardiology consult and stress test.  She has remained afebrile throughout her ER stay.  She will be placed in ED observation unit.  I spoke with MARY Barclay regarding plan of care.  He plans to contact cardiology for scheduling stress test.  He was informed the patient has remained n.p.o. since last night.  Patient is agreeable to the plan of care and will be moved to her new room shortly.    This document is intended for medical expert use only.  Reading of this document by patients and/or patient's family without participating medical staff guidance may result in misinterpretation and unintended morbidity.  Any interpretation of such data is the responsibility of the patient and/or family member responsible for the patient in concert with their primary or specialist providers, not to be left for sources of online search as such as [employer], [employer] or similar queries.  Relying on these approaches to knowledge may result in misinterpretation, misguided goals of care and even death should patient or family members try recommendations outside of the realm of professional medical care in a supervised inpatient environment.       Amount and/or Complexity of Data Reviewed  Clinical lab tests: ordered and reviewed  Tests in the radiology section of CPT®: ordered and reviewed    Risk of Complications, Morbidity, and/or Mortality  Presenting problems: high  Diagnostic procedures: high  Management options: high    Patient Progress  Patient  progress: improved      Final diagnoses:   Chest pain, unspecified type   Dizziness   Nausea       ED Disposition  ED Disposition     ED Disposition   Decision to Admit    Condition   --    Comment   --             No follow-up provider specified.       Medication List      No changes were made to your prescriptions during this visit.          Akua Badillo, APRN  07/01/22 5900

## 2022-07-01 NOTE — PLAN OF CARE
Goal Outcome Evaluation:                 Problem: Adult Inpatient Plan of Care  Goal: Plan of Care Review  Outcome: Ongoing, Progressing  Flowsheets (Taken 7/1/2022 0965)  Progress: no change  Plan of Care Reviewed With: patient  Outcome Evaluation: Patient new admit from ED with chest pain. Myoview tomorrow.

## 2022-07-01 NOTE — H&P
[facility] Observation Unit H&P    Patient Name: Evelyn Garcia  : 1975  MRN: [id number]  Primary Care Physician: Kathia Cooper MD  Date of admission: 2022     Patient Care Team:  Kathia Cooper MD as PCP - General          Subjective   History Present Illness     Chief Complaint:   Chief Complaint   Patient presents with   • Chest Pain         Obtained from ED provider HPI on 2022:  Patient is a 47-year-old obese  female who presents today with complaints of midsternal chest pain, dizziness and nausea that began this morning.  She reports a history of GERD but does not believe that this pain feels like indigestion.  She states that pain occurs midsternal but radiates to her right chest.  She states earlier this morning it also radiated to her back but that has since resolved.  She rates the pain 5/10 and describes as a dull/heavy pain.  She cannot identify any relieving or aggravating factors.  She states she does not have any cardiac history but she has family history of cardiac issues in her mother and father.  She denies vomiting, shortness of breath, fever and dysuria.  She reports a history of hypertension, hypercholesteremia, and GERD.  She denies drug and alcohol use, as well as tobacco.    2022 (postobservation admission): Patient confirms the HPI noted above including a sudden onset of substernal right-sided chest pain described as a pressure with severe associated nausea without vomiting which occurred on the morning of presentation while patient was at work but without any significant exertion.  She notes the pain radiated towards her back without obvious provoking or palliative factors.  She denies any dyspnea, fever, palpitations, peripheral edema, syncope or near syncope.  She has had a mildly productive cough for some time now.  Family history is notable for CAD in both her mother, father as well as all of her grandparents.  No previous cardiac work-up has been  performed.  She does have a history of cholecystectomy.      Review of Systems   Constitutional: Negative.   HENT: Negative.    Eyes: Negative.    Cardiovascular: Positive for chest pain. Negative for leg swelling, near-syncope, orthopnea, palpitations and syncope.   Respiratory: Negative.    Skin: Negative.    Musculoskeletal: Negative.    Gastrointestinal: Positive for abdominal pain and nausea. Negative for vomiting.   Genitourinary: Negative.    Neurological: Negative.    Psychiatric/Behavioral: Negative.            Personal History     Past Medical History:   Past Medical History:   Diagnosis Date   • Anxiety    • COVID    • GERD (gastroesophageal reflux disease)    • Hypertension        Surgical History:      Past Surgical History:   Procedure Laterality Date   •  SECTION      X2   • CHOLECYSTECTOMY     • HERNIA REPAIR      X3   • HYSTERECTOMY     • RECTAL POLYPECTOMY             Family History: family history includes Heart disease in her father and mother. Otherwise pertinent FHx was reviewed and unremarkable.     Social History:  reports that she has never smoked. She has never used smokeless tobacco. She reports current alcohol use. She reports that she does not use drugs.      Medications:  Prior to Admission medications    Medication Sig Start Date End Date Taking? Authorizing Provider   atorvastatin (LIPITOR) 40 MG tablet Take 1 tablet by mouth once daily 21   Kathia Cooper MD   cyclobenzaprine (FLEXERIL) 10 MG tablet Take 1 tablet by mouth 3 (Three) Times a Day As Needed for Muscle Spasms. 21   Kathia Cooper MD   escitalopram (LEXAPRO) 20 MG tablet Take 1 tablet by mouth once daily 3/4/22   Kathia Cooper MD   gabapentin (NEURONTIN) 300 MG capsule Take 1 capsule by mouth 2 (Two) Times a Day. 21   Kathia Cooper MD   lisinopril (PRINIVIL,ZESTRIL) 10 MG tablet Take 1 tablet by mouth Daily. 11/10/21   Kathia Cooper MD   Multiple Vitamins-Minerals  (MULTIVITAMIN ADULT PO) Take 1 tablet by mouth Daily.    Emergency, Nurse Evelyn, RN   sucralfate (Carafate) 1 g tablet Take 1 tablet by mouth 4 (Four) Times a Day. 5/4/22   Kathia Cooper MD       Allergies:    Allergies   Allergen Reactions   • Butorphanol Unknown - High Severity     Slows breathing-unresponsive     • Drug [Casirivimab-Imdevimab] Other (See Comments)     Patient hot, nauseated, throat swelling, chest pain, headache,shaking uncontrollably   • Hydromorphone Hcl Unknown - High Severity     Slows breathing -unresponsive   • Monoclonal Antibodies Anaphylaxis     Patient had monoclonal antibody infusion and stated she was sent to er with reaction   • Nalbuphine Unknown - High Severity   • Latex, Natural Rubber Hives       Objective   Objective     Vital Signs  Temp:  [97.5 °F (36.4 °C)] 97.5 °F (36.4 °C)  Heart Rate:  [80-93] 93  Resp:  [16] 16  BP: (131-151)/(84-88) 136/84  SpO2:  [97 %-100 %] 98 %  on   ;   Device (Oxygen Therapy): room air  Body mass index is 30.47 kg/m².    Physical Exam  Constitutional:       General: She is not in acute distress.     Appearance: Normal appearance. She is obese. She is not ill-appearing, toxic-appearing or diaphoretic.   HENT:      Head: Normocephalic.      Right Ear: External ear normal.      Left Ear: External ear normal.      Nose: Nose normal.      Mouth/Throat:      Mouth: Mucous membranes are moist.   Eyes:      Extraocular Movements: Extraocular movements intact.   Cardiovascular:      Rate and Rhythm: Normal rate and regular rhythm.      Pulses: Normal pulses.   Pulmonary:      Effort: Pulmonary effort is normal.      Breath sounds: Normal breath sounds.   Abdominal:      General: Bowel sounds are normal.      Palpations: Abdomen is soft.   Musculoskeletal:         General: Normal range of motion.      Cervical back: Normal range of motion.      Right lower leg: No edema.      Left lower leg: No edema.   Skin:     General: Skin is warm and dry.       Capillary Refill: Capillary refill takes less than 2 seconds.   Neurological:      General: No focal deficit present.      Mental Status: She is alert.   Psychiatric:         Mood and Affect: Mood normal.         Behavior: Behavior normal.         Thought Content: Thought content normal.         Judgment: Judgment normal.           Results Review:  I have personally reviewed most recent cardiac tracings, lab results and radiology images and interpretations and agree with findings, most notably: Troponin, proBNP, CMP, CBC, D-dimer, lipase, magnesium, UA, chest x-ray, CT of head and EKG.    Results from last 7 days   Lab Units 07/01/22  1126   WBC 10*3/mm3 9.40   HEMOGLOBIN g/dL 14.1   HEMATOCRIT % 40.6   PLATELETS 10*3/mm3 293     Results from last 7 days   Lab Units 07/01/22  1126   SODIUM mmol/L 137   POTASSIUM mmol/L 4.3   CHLORIDE mmol/L 102   CO2 mmol/L 22.0   BUN mg/dL 13   CREATININE mg/dL 0.51*   GLUCOSE mg/dL 95   CALCIUM mg/dL 9.7   ALT (SGPT) U/L 29   AST (SGOT) U/L 19   TROPONIN T ng/mL <0.010   PROBNP pg/mL 22.2     Estimated Creatinine Clearance: 118.4 mL/min (A) (by C-G formula based on SCr of 0.51 mg/dL (L)).  Brief Urine Lab Results  (Last result in the past 365 days)      Color   Clarity   Blood   Leuk Est   Nitrite   Protein   CREAT   Urine HCG        07/01/22 1132 Yellow   Clear   Small (1+)   Negative   Negative   Negative                 Microbiology Results (last 10 days)     ** No results found for the last 240 hours. **          ECG/EMG Results (most recent)     Procedure Component Value Units Date/Time    ECG 12 Lead [[id number]] Collected: 07/01/22 1058     Updated: 07/01/22 1059     QT Interval 394 ms     Narrative:      HEART RATE= 79  bpm  RR Interval= 756  ms  AK Interval= 156  ms  P Horizontal Axis= -5  deg  P Front Axis= 42  deg  QRSD Interval= 87  ms  QT Interval= 394  ms  QRS Axis= 5  deg  T Wave Axis= 25  deg  - NORMAL ECG -  Sinus rhythm  Electronically Signed By:   Date and Time  of Study: 2022-07-01 10:58:18                  CT Head Without Contrast    Result Date: 7/1/2022  No acute intracranial abnormality.  Chronic paranasal sinus disease  Electronically Signed By-Moo Arroyo On:7/1/2022 12:49 PM This report was finalized on [id number] by  Moo Arroyo, .    XR Chest 1 View    Result Date: 7/1/2022  No acute cardiopulmonary process.  Electronically Signed By-Susi Agarwal MD On:7/1/2022 11:35 AM This report was finalized on [id number] by  Susi Agarwal MD.        Estimated Creatinine Clearance: 118.4 mL/min (A) (by C-G formula based on SCr of 0.51 mg/dL (L)).    Assessment & Plan   Assessment/Plan       Active Hospital Problems    Diagnosis  POA   • Chest pain [R07.9]  Yes      Resolved Hospital Problems   No resolved problems to display.     Chest pain  -Initial troponin: Less than 0.010, trend  -proBNP: 22.2  -D-dimer: 0.46  -Chest X-Ray: Chest x-ray shows no acute cardiopulmonary process  -EKG shows sinus rhythm at 79 without obvious acute ST changes or ectopy and a QTC of 4 and 53 ms  -healthy heart diet for now, NPO at midnight  -Check lipid panel  -81mg ASA daily  -Stress Test ordered  -Telemetry    Hypertension  -Well controlled with a most recent blood pressure of 122/86  - Continue lisinopril  - Monitor while admitted    Hyperlipidemia  -Statin    Depression  -Lexapro    Obesity (BMI: 30.47)  -Encourage diet lifestyle modification          VTE Prophylaxis -   Mechanical Order History:     None      Pharmalogical Order History:     None          CODE STATUS:    There are no questions and answers to display.       This patient has been examined wearing personal protective equipment.     I discussed the patient's findings and my recommendations with patient and nursing staff.      Signature:Electronically signed by Trenton Fortune PA-C, 07/01/22, 4:27 PM EDT.

## 2022-07-01 NOTE — CASE MANAGEMENT/SOCIAL WORK
Discharge Planning Assessment  [facility]     Patient Name: Evelyn Garcia  MRN: [id number]  Today's Date: 7/1/2022    Admit Date: 7/1/2022     Discharge Needs Assessment     Row Name 07/01/22 1350       Living Environment    People in Home spouse    Name(s) of People in Home [place]-spouse    Current Living Arrangements home    Primary Care Provided by self    Provides Primary Care For no one    Family Caregiver if Needed significant other    Quality of Family Relationships supportive    Able to Return to Prior Arrangements yes       Resource/Environmental Concerns    Resource/Environmental Concerns none    Transportation Concerns none       Transition Planning    Patient/Family Anticipates Transition to home with family    Transportation Anticipated family or friend will provide       Discharge Needs Assessment    Readmission Within the Last 30 Days no previous admission in last 30 days    Equipment Currently Used at Home none    Concerns to be Addressed discharge planning    Equipment Needed After Discharge none    Provided Post Acute Provider List? N/A    Provided Post Acute Provider Quality & Resource List? N/A               Discharge Plan     Row Name 07/01/22 9771       Plan    Plan DC Plan: Return Home    Plan Comments Spoke with patient at bedside, lives in own home with spouse. Independent with ADL's. Confirmed PCP/Pharmacy. No transportation or prescription coverage issues. Denies needs.              Demographic Summary     Row Name 07/01/22 1345       General Information    Admission Type observation    Arrived From emergency department    Referral Source emergency department    Reason for Consult discharge planning    Preferred Language English               Functional Status     Row Name 07/01/22 1344       Functional Status    Usual Activity Tolerance good    Current Activity Tolerance good       Functional Status, IADL    Medications independent    Meal Preparation independent    Housekeeping independent     Laundry independent    Shopping independent       Mental Status    General Appearance WDL WDL            Met with patient in room wearing PPE: mask, face shield/goggles, gloves, gown.      Maintained distance greater than six feet and spent less than 15 minutes in the room.          An Dyer RN

## 2022-07-01 NOTE — ED NOTES
[facility] rm 3

## 2022-07-02 ENCOUNTER — APPOINTMENT (OUTPATIENT)
Dept: NUCLEAR MEDICINE | Facility: HOSPITAL | Age: 47
End: 2022-07-02

## 2022-07-02 ENCOUNTER — APPOINTMENT (OUTPATIENT)
Dept: CT IMAGING | Facility: HOSPITAL | Age: 47
End: 2022-07-02

## 2022-07-02 LAB
ANION GAP SERPL CALCULATED.3IONS-SCNC: 16 MMOL/L (ref 5–15)
BH CV REST NUCLEAR ISOTOPE DOSE: 7.9 MCI
BH CV STRESS BP STAGE 1: NORMAL
BH CV STRESS BP STAGE 2: NORMAL
BH CV STRESS BP STAGE 3: NORMAL
BH CV STRESS DURATION MIN STAGE 1: 3
BH CV STRESS DURATION MIN STAGE 2: 3
BH CV STRESS DURATION MIN STAGE 3: 2
BH CV STRESS DURATION SEC STAGE 1: 0
BH CV STRESS DURATION SEC STAGE 2: 0
BH CV STRESS DURATION SEC STAGE 3: 0
BH CV STRESS GRADE STAGE 1: 10
BH CV STRESS GRADE STAGE 2: 12
BH CV STRESS GRADE STAGE 3: 14
BH CV STRESS HR STAGE 1: 112
BH CV STRESS HR STAGE 2: 127
BH CV STRESS HR STAGE 3: 149
BH CV STRESS METS STAGE 1: 5
BH CV STRESS METS STAGE 2: 7.5
BH CV STRESS METS STAGE 3: 10
BH CV STRESS NUCLEAR ISOTOPE DOSE: 21.8 MCI
BH CV STRESS PROTOCOL 1: NORMAL
BH CV STRESS RECOVERY BP: NORMAL MMHG
BH CV STRESS RECOVERY HR: 91 BPM
BH CV STRESS SPEED STAGE 1: 1.7
BH CV STRESS SPEED STAGE 2: 2.5
BH CV STRESS SPEED STAGE 3: 3.4
BH CV STRESS STAGE 1: 1
BH CV STRESS STAGE 2: 2
BH CV STRESS STAGE 3: 3
BUN SERPL-MCNC: 11 MG/DL (ref 6–20)
BUN/CREAT SERPL: 22 (ref 7–25)
CALCIUM SPEC-SCNC: 8.5 MG/DL (ref 8.6–10.5)
CHLORIDE SERPL-SCNC: 105 MMOL/L (ref 98–107)
CHOLEST SERPL-MCNC: 184 MG/DL (ref 0–200)
CO2 SERPL-SCNC: 19 MMOL/L (ref 22–29)
CREAT SERPL-MCNC: 0.5 MG/DL (ref 0.57–1)
EGFRCR SERPLBLD CKD-EPI 2021: 115.9 ML/MIN/1.73
GLUCOSE SERPL-MCNC: 109 MG/DL (ref 65–99)
HDLC SERPL-MCNC: 43 MG/DL (ref 40–60)
LDLC SERPL CALC-MCNC: 121 MG/DL (ref 0–100)
LDLC/HDLC SERPL: 2.75 {RATIO}
LV EF NUC BP: 80 %
MAGNESIUM SERPL-MCNC: 2.1 MG/DL (ref 1.6–2.6)
MAGNESIUM SERPL-MCNC: 2.4 MG/DL (ref 1.6–2.6)
MAXIMAL PREDICTED HEART RATE: 172 BPM
PERCENT MAX PREDICTED HR: 87.21 %
POTASSIUM SERPL-SCNC: 3.9 MMOL/L (ref 3.5–5.2)
POTASSIUM SERPL-SCNC: 4.2 MMOL/L (ref 3.5–5.2)
QT INTERVAL: 357 MS
SODIUM SERPL-SCNC: 140 MMOL/L (ref 136–145)
STRESS BASELINE BP: NORMAL MMHG
STRESS BASELINE HR: 93 BPM
STRESS PERCENT HR: 103 %
STRESS POST EXERCISE DUR MIN: 8 MIN
STRESS POST EXERCISE DUR SEC: 0 SEC
STRESS POST PEAK BP: NORMAL MMHG
STRESS POST PEAK HR: 150 BPM
STRESS TARGET HR: 146 BPM
TRIGL SERPL-MCNC: 113 MG/DL (ref 0–150)
TROPONIN T SERPL-MCNC: <0.01 NG/ML (ref 0–0.03)
TROPONIN T SERPL-MCNC: <0.01 NG/ML (ref 0–0.03)
VLDLC SERPL-MCNC: 20 MG/DL (ref 5–40)

## 2022-07-02 PROCEDURE — 0 TECHNETIUM TETROFOSMIN KIT: Performed by: EMERGENCY MEDICINE

## 2022-07-02 PROCEDURE — 93016 CV STRESS TEST SUPVJ ONLY: CPT | Performed by: NURSE PRACTITIONER

## 2022-07-02 PROCEDURE — G0378 HOSPITAL OBSERVATION PER HR: HCPCS

## 2022-07-02 PROCEDURE — 96376 TX/PRO/DX INJ SAME DRUG ADON: CPT

## 2022-07-02 PROCEDURE — 84484 ASSAY OF TROPONIN QUANT: CPT | Performed by: PHYSICIAN ASSISTANT

## 2022-07-02 PROCEDURE — 25010000002 MORPHINE PER 10 MG

## 2022-07-02 PROCEDURE — 83735 ASSAY OF MAGNESIUM: CPT | Performed by: PHYSICIAN ASSISTANT

## 2022-07-02 PROCEDURE — 0 IOPAMIDOL PER 1 ML: Performed by: EMERGENCY MEDICINE

## 2022-07-02 PROCEDURE — 78452 HT MUSCLE IMAGE SPECT MULT: CPT

## 2022-07-02 PROCEDURE — 80048 BASIC METABOLIC PNL TOTAL CA: CPT | Performed by: PHYSICIAN ASSISTANT

## 2022-07-02 PROCEDURE — 84132 ASSAY OF SERUM POTASSIUM: CPT | Performed by: PHYSICIAN ASSISTANT

## 2022-07-02 PROCEDURE — 80061 LIPID PANEL: CPT | Performed by: PHYSICIAN ASSISTANT

## 2022-07-02 PROCEDURE — 25010000002 KETOROLAC TROMETHAMINE PER 15 MG: Performed by: PHYSICIAN ASSISTANT

## 2022-07-02 PROCEDURE — 96375 TX/PRO/DX INJ NEW DRUG ADDON: CPT

## 2022-07-02 PROCEDURE — A9502 TC99M TETROFOSMIN: HCPCS | Performed by: EMERGENCY MEDICINE

## 2022-07-02 PROCEDURE — 93017 CV STRESS TEST TRACING ONLY: CPT

## 2022-07-02 PROCEDURE — 93018 CV STRESS TEST I&R ONLY: CPT | Performed by: INTERNAL MEDICINE

## 2022-07-02 PROCEDURE — 25010000002 ENOXAPARIN PER 10 MG: Performed by: PHYSICIAN ASSISTANT

## 2022-07-02 PROCEDURE — 96372 THER/PROPH/DIAG INJ SC/IM: CPT

## 2022-07-02 PROCEDURE — 71275 CT ANGIOGRAPHY CHEST: CPT

## 2022-07-02 PROCEDURE — 93005 ELECTROCARDIOGRAM TRACING: CPT | Performed by: EMERGENCY MEDICINE

## 2022-07-02 PROCEDURE — 78452 HT MUSCLE IMAGE SPECT MULT: CPT | Performed by: INTERNAL MEDICINE

## 2022-07-02 RX ORDER — ONDANSETRON 4 MG/1
4 TABLET, FILM COATED ORAL EVERY 6 HOURS PRN
Status: DISCONTINUED | OUTPATIENT
Start: 2022-07-02 | End: 2022-07-03 | Stop reason: HOSPADM

## 2022-07-02 RX ORDER — NALOXONE HCL 0.4 MG/ML
0.4 VIAL (ML) INJECTION
Status: DISCONTINUED | OUTPATIENT
Start: 2022-07-02 | End: 2022-07-03 | Stop reason: HOSPADM

## 2022-07-02 RX ORDER — MORPHINE SULFATE 2 MG/ML
2 INJECTION, SOLUTION INTRAMUSCULAR; INTRAVENOUS ONCE
Status: COMPLETED | OUTPATIENT
Start: 2022-07-02 | End: 2022-07-02

## 2022-07-02 RX ORDER — ALUMINA, MAGNESIA, AND SIMETHICONE 2400; 2400; 240 MG/30ML; MG/30ML; MG/30ML
15 SUSPENSION ORAL EVERY 6 HOURS PRN
Status: DISCONTINUED | OUTPATIENT
Start: 2022-07-02 | End: 2022-07-03 | Stop reason: HOSPADM

## 2022-07-02 RX ORDER — NALOXONE HYDROCHLORIDE 0.4 MG/ML
INJECTION, SOLUTION INTRAMUSCULAR; INTRAVENOUS; SUBCUTANEOUS
Status: DISCONTINUED
Start: 2022-07-02 | End: 2022-07-02 | Stop reason: WASHOUT

## 2022-07-02 RX ORDER — MORPHINE SULFATE 2 MG/ML
INJECTION, SOLUTION INTRAMUSCULAR; INTRAVENOUS
Status: COMPLETED
Start: 2022-07-02 | End: 2022-07-02

## 2022-07-02 RX ORDER — SIMETHICONE 80 MG
80 TABLET,CHEWABLE ORAL ONCE
Status: COMPLETED | OUTPATIENT
Start: 2022-07-02 | End: 2022-07-02

## 2022-07-02 RX ORDER — LIDOCAINE HYDROCHLORIDE 20 MG/ML
10 SOLUTION OROPHARYNGEAL ONCE
Status: COMPLETED | OUTPATIENT
Start: 2022-07-02 | End: 2022-07-02

## 2022-07-02 RX ORDER — KETOROLAC TROMETHAMINE 15 MG/ML
15 INJECTION, SOLUTION INTRAMUSCULAR; INTRAVENOUS EVERY 6 HOURS PRN
Status: DISCONTINUED | OUTPATIENT
Start: 2022-07-02 | End: 2022-07-03

## 2022-07-02 RX ORDER — SODIUM CHLORIDE 0.9 % (FLUSH) 0.9 %
10 SYRINGE (ML) INJECTION AS NEEDED
Status: DISCONTINUED | OUTPATIENT
Start: 2022-07-02 | End: 2022-07-03 | Stop reason: HOSPADM

## 2022-07-02 RX ORDER — ONDANSETRON 2 MG/ML
4 INJECTION INTRAMUSCULAR; INTRAVENOUS EVERY 6 HOURS PRN
Status: DISCONTINUED | OUTPATIENT
Start: 2022-07-02 | End: 2022-07-03 | Stop reason: HOSPADM

## 2022-07-02 RX ORDER — ATORVASTATIN CALCIUM 40 MG/1
40 TABLET, FILM COATED ORAL NIGHTLY
Status: DISCONTINUED | OUTPATIENT
Start: 2022-07-02 | End: 2022-07-03 | Stop reason: HOSPADM

## 2022-07-02 RX ORDER — NITROGLYCERIN 0.4 MG/1
0.4 TABLET SUBLINGUAL
Status: DISCONTINUED | OUTPATIENT
Start: 2022-07-02 | End: 2022-07-03 | Stop reason: HOSPADM

## 2022-07-02 RX ORDER — CHOLECALCIFEROL (VITAMIN D3) 125 MCG
5 CAPSULE ORAL NIGHTLY PRN
Status: DISCONTINUED | OUTPATIENT
Start: 2022-07-02 | End: 2022-07-03 | Stop reason: HOSPADM

## 2022-07-02 RX ORDER — ENOXAPARIN SODIUM 100 MG/ML
40 INJECTION SUBCUTANEOUS EVERY 24 HOURS
Status: DISCONTINUED | OUTPATIENT
Start: 2022-07-02 | End: 2022-07-03

## 2022-07-02 RX ORDER — PANTOPRAZOLE SODIUM 40 MG/10ML
40 INJECTION, POWDER, LYOPHILIZED, FOR SOLUTION INTRAVENOUS ONCE
Status: COMPLETED | OUTPATIENT
Start: 2022-07-02 | End: 2022-07-02

## 2022-07-02 RX ORDER — LISINOPRIL 5 MG/1
10 TABLET ORAL DAILY
Status: DISCONTINUED | OUTPATIENT
Start: 2022-07-02 | End: 2022-07-03 | Stop reason: HOSPADM

## 2022-07-02 RX ORDER — SODIUM CHLORIDE 0.9 % (FLUSH) 0.9 %
10 SYRINGE (ML) INJECTION EVERY 12 HOURS SCHEDULED
Status: DISCONTINUED | OUTPATIENT
Start: 2022-07-02 | End: 2022-07-03 | Stop reason: HOSPADM

## 2022-07-02 RX ORDER — LABETALOL HYDROCHLORIDE 5 MG/ML
10 INJECTION, SOLUTION INTRAVENOUS ONCE
Status: COMPLETED | OUTPATIENT
Start: 2022-07-02 | End: 2022-07-02

## 2022-07-02 RX ADMIN — ENOXAPARIN SODIUM 40 MG: 100 INJECTION SUBCUTANEOUS at 17:05

## 2022-07-02 RX ADMIN — SIMETHICONE 80 MG: 80 TABLET, CHEWABLE ORAL at 18:59

## 2022-07-02 RX ADMIN — NITROGLYCERIN 0.4 MG: 0.4 TABLET SUBLINGUAL at 14:06

## 2022-07-02 RX ADMIN — TETROFOSMIN 1 DOSE: 1.38 INJECTION, POWDER, LYOPHILIZED, FOR SOLUTION INTRAVENOUS at 10:12

## 2022-07-02 RX ADMIN — ALUMINA, MAGNESIA, AND SIMETHICONE 15 ML: 2400; 2400; 240 SUSPENSION ORAL at 12:43

## 2022-07-02 RX ADMIN — TETROFOSMIN 1 DOSE: 1.38 INJECTION, POWDER, LYOPHILIZED, FOR SOLUTION INTRAVENOUS at 07:56

## 2022-07-02 RX ADMIN — Medication 10 MG: at 14:25

## 2022-07-02 RX ADMIN — KETOROLAC TROMETHAMINE 15 MG: 15 INJECTION, SOLUTION INTRAMUSCULAR; INTRAVENOUS at 09:03

## 2022-07-02 RX ADMIN — KETOROLAC TROMETHAMINE 15 MG: 15 INJECTION, SOLUTION INTRAMUSCULAR; INTRAVENOUS at 18:29

## 2022-07-02 RX ADMIN — PANTOPRAZOLE SODIUM 40 MG: 40 INJECTION, POWDER, FOR SOLUTION INTRAVENOUS at 18:59

## 2022-07-02 RX ADMIN — NITROGLYCERIN 0.4 MG: 0.4 TABLET SUBLINGUAL at 14:19

## 2022-07-02 RX ADMIN — Medication 10 ML: at 09:05

## 2022-07-02 RX ADMIN — MORPHINE SULFATE 2 MG: 2 INJECTION, SOLUTION INTRAMUSCULAR; INTRAVENOUS at 14:13

## 2022-07-02 RX ADMIN — LIDOCAINE HYDROCHLORIDE 10 ML: 20 SOLUTION ORAL; TOPICAL at 14:00

## 2022-07-02 RX ADMIN — LISINOPRIL 10 MG: 5 TABLET ORAL at 09:03

## 2022-07-02 RX ADMIN — MORPHINE SULFATE 2 MG: 2 INJECTION, SOLUTION INTRAMUSCULAR; INTRAVENOUS at 14:50

## 2022-07-02 RX ADMIN — IOPAMIDOL 100 ML: 755 INJECTION, SOLUTION INTRAVENOUS at 15:18

## 2022-07-02 RX ADMIN — ATORVASTATIN CALCIUM 40 MG: 40 TABLET, FILM COATED ORAL at 20:55

## 2022-07-02 RX ADMIN — Medication 10 ML: at 20:55

## 2022-07-02 RX ADMIN — Medication 10 ML: at 14:02

## 2022-07-02 NOTE — PLAN OF CARE
Problem: Adult Inpatient Plan of Care  Goal: Plan of Care Review  Outcome: Ongoing, Progressing  Goal: Patient-Specific Goal (Individualized)  Outcome: Ongoing, Progressing  Goal: Absence of Hospital-Acquired Illness or Injury  Outcome: Ongoing, Progressing  Intervention: Identify and Manage Fall Risk  Description: Perform standard risk assessment on admission using a validated tool or comprehensive approach appropriate to the patient; reassess fall risk frequently, with change in status or transfer to another level of care.  Communicate fall injury risk to interprofessional healthcare team.  Determine need for increased observation, equipment and environmental modification, such as low bed, signage and supportive, nonskid footwear.  Adjust safety measures to individual developmental age, stage and identified risk factors.  Reinforce the importance of safety and physical activity with patient and family.  Perform regular intentional rounding to assess need for position change, pain assessment and personal needs, including assistance with toileting.  Recent Flowsheet Documentation  Taken 7/2/2022 1400 by Shraddha Montoya RN  Safety Promotion/Fall Prevention:   clutter free environment maintained   assistive device/personal items within reach   safety round/check completed   room organization consistent  Taken 7/2/2022 1200 by Shraddha Montoya RN  Safety Promotion/Fall Prevention:   assistive device/personal items within reach   clutter free environment maintained   safety round/check completed   room organization consistent  Intervention: Prevent Skin Injury  Description: Perform a screening for skin injury risk, such as pressure or moisture associated skin damage on admission and at regular intervals throughout hospital stay.  Keep all areas of skin (especially folds) clean and dry.  Maintain adequate skin hydration.  Relieve and redistribute pressure and protect bony prominences; implement measures based on  patient-specific risk factors.  Match turning and repositioning schedule to clinical condition.  Encourage weight shift frequently; assist with reposition if unable to complete independently.  Float heels off bed; avoid pressure on the Achilles tendon.  Keep skin free from extended contact with medical devices.  Encourage functional activity and mobility, as early as tolerated.  Use aids (e.g., slide boards, mechanical lift) during transfer.  Recent Flowsheet Documentation  Taken 7/2/2022 1400 by Shraddha Montoya RN  Body Position:   sitting up in bed   position changed independently  Taken 7/2/2022 1200 by Shraddha Montoya RN  Body Position:   left   side-lying  Intervention: Prevent and Manage VTE (Venous Thromboembolism) Risk  Description: Assess for VTE (venous thromboembolism) risk.  Encourage and assist with early ambulation.  Initiate and maintain compression or other therapy, as indicated, based on identified risk in accordance with organizational protocol and provider order.  Encourage both active and passive leg exercises while in bed, if unable to ambulate.  Recent Flowsheet Documentation  Taken 7/2/2022 1400 by Shraddha Montoya RN  Activity Management: activity encouraged  Taken 7/2/2022 1200 by Shraddha Montoya RN  Activity Management: activity encouraged  Intervention: Prevent Infection  Description: Maintain skin and mucous membrane integrity; promote hand, oral and pulmonary hygiene.  Optimize fluid balance, nutrition, sleep and glycemic control to maximize infection resistance.  Identify potential sources of infection early to prevent or mitigate progression of infection (e.g., wound, lines, devices).  Evaluate ongoing need for invasive devices; remove promptly when no longer indicated.  Recent Flowsheet Documentation  Taken 7/2/2022 1400 by Shraddha Montoya RN  Infection Prevention:   single patient room provided   rest/sleep promoted  Taken 7/2/2022 1200 by Shraddha Montoya RN  Infection Prevention:   single  patient room provided   rest/sleep promoted  Goal: Optimal Comfort and Wellbeing  Outcome: Ongoing, Progressing  Intervention: Monitor Pain and Promote Comfort  Description: Assess pain level, treatment efficacy and patient response at regular intervals using a consistent pain scale.  Consider the presence and impact of preexisting chronic pain.  Encourage patient and caregiver involvement in pain assessment, interventions and safety measures.  Recent Flowsheet Documentation  Taken 7/2/2022 1200 by Shraddha Montoya RN  Pain Management Interventions: quiet environment facilitated  Intervention: Provide Person-Centered Care  Description: Use a family-focused approach to care.  Develop trust and rapport by proactively providing information, encouraging questions, addressing concerns and offering reassurance.  Acknowledge emotional response to hospitalization.  Recognize and utilize personal coping strategies.  Honor spiritual and cultural preferences.  Recent Flowsheet Documentation  Taken 7/2/2022 1200 by Shraddha Montoya RN  Trust Relationship/Rapport:   care explained   choices provided  Goal: Readiness for Transition of Care  Outcome: Ongoing, Progressing   Goal Outcome Evaluation:         Patient having continued chest pain, further testing ordered.

## 2022-07-02 NOTE — DISCHARGE SUMMARY
[facility]    Kathia Cooper MD    CHIEF COMPLAINT:     Chest pain    HISTORY OF PRESENT ILLNESS:    Obtained from ED provider HPI on 7/1/2022:  Patient is a 47-year-old obese  female who presents today with complaints of midsternal chest pain, dizziness and nausea that began this morning.  She reports a history of GERD but does not believe that this pain feels like indigestion.  She states that pain occurs midsternal but radiates to her right chest.  She states earlier this morning it also radiated to her back but that has since resolved.  She rates the pain 5/10 and describes as a dull/heavy pain.  She cannot identify any relieving or aggravating factors.  She states she does not have any cardiac history but she has family history of cardiac issues in her mother and father.  She denies vomiting, shortness of breath, fever and dysuria.  She reports a history of hypertension, hypercholesteremia, and GERD.  She denies drug and alcohol use, as well as tobacco.     7/1/2022 (postobservation admission): Patient confirms the HPI noted above including a sudden onset of substernal right-sided chest pain described as a pressure with severe associated nausea without vomiting which occurred on the morning of presentation while patient was at work but without any significant exertion.  She notes the pain radiated towards her back without obvious provoking or palliative factors.  She denies any dyspnea, fever, palpitations, peripheral edema, syncope or near syncope.  She has had a mildly productive cough for some time now.  Family history is notable for CAD in both her mother, father as well as all of her grandparents.  No previous cardiac work-up has been performed.  She does have a history of cholecystectomy.     7/2/2022: Patient reports she did fairly well throughout the night though she does continue to have some right-sided chest pain that radiates towards her back which seems to be  worse with deep breathing.        Past Medical History:   Diagnosis Date   • Anxiety    • COVID    • GERD (gastroesophageal reflux disease)    • Hypertension      Past Surgical History:   Procedure Laterality Date   •  SECTION      X2   • CHOLECYSTECTOMY     • HERNIA REPAIR      X3   • HYSTERECTOMY     • RECTAL POLYPECTOMY       Family History   Problem Relation Age of Onset   • Heart disease Mother    • Heart disease Father      Social History     Tobacco Use   • Smoking status: Never Smoker   • Smokeless tobacco: Never Used   Substance Use Topics   • Alcohol use: Yes     Comment: SOCIAL   • Drug use: Never     Medications Prior to Admission   Medication Sig Dispense Refill Last Dose   • atorvastatin (LIPITOR) 40 MG tablet Take 1 tablet by mouth once daily 90 tablet 3 2022 at Unknown time   • b complex-C-folic acid 1 MG capsule Take 1 capsule by mouth Daily.   2022 at Unknown time   • lisinopril (PRINIVIL,ZESTRIL) 10 MG tablet Take 1 tablet by mouth Daily. 90 tablet 1 2022 at Unknown time   • Multiple Vitamins-Minerals (MULTIVITAMIN ADULT PO) Take 1 tablet by mouth Daily.   2022 at Unknown time     Allergies:  Butorphanol; Drug [casirivimab-imdevimab]; Hydromorphone hcl; Monoclonal antibodies; Nalbuphine; and Latex, natural rubber    Immunization History   Administered Date(s) Administered   • COVID-19 (PFIZER) PURPLE CAP 2021, 10/22/2021   • Flu Vaccine Intradermal Quad 18-64YR 10/16/2018, 2019   • Flu Vaccine Quad PF 6-35MO 2016, 11/10/2021   • FluLaval/Fluarix/Fluzone >6 2020, 11/10/2021   • Fluzone Split Quad (Multi-dose) 10/16/2018   • H1N1 Inj 2009   • Influenza, Unspecified 2015   • MMR 11/10/2021   • PPD Test 2017, 07/10/2017, 2018   • Varicella 11/10/2021   • flucelvax quad pfs =>4 YRS 2019           REVIEW OF SYSTEMS:    Review of Systems   Constitutional: Negative.   HENT: Negative.    Eyes: Negative.    Cardiovascular:  Positive for chest pain. Negative for leg swelling, near-syncope, orthopnea, palpitations and syncope.   Respiratory: Negative.    Skin: Negative.    Musculoskeletal: Negative.    Gastrointestinal: Positive for abdominal pain and nausea. Negative for vomiting.   Genitourinary: Negative.    Neurological: Negative.    Psychiatric/Behavioral: Negative.      Vital Signs  Temp:  [97.6 °F (36.4 °C)-98.2 °F (36.8 °C)] 97.9 °F (36.6 °C)  Heart Rate:  [69-88] 86  Resp:  [11-18] 16  BP: (104-134)/(67-80) 126/79          Physical Exam:  Physical Exam  Vitals reviewed.   Constitutional:       General: She is not in acute distress.     Appearance: Normal appearance. She is obese. She is not ill-appearing, toxic-appearing or diaphoretic.   HENT:      Head: Normocephalic.      Right Ear: External ear normal.      Left Ear: External ear normal.      Nose: Nose normal.      Mouth/Throat:      Mouth: Mucous membranes are moist.   Eyes:      Extraocular Movements: Extraocular movements intact.   Cardiovascular:      Rate and Rhythm: Normal rate and regular rhythm.      Pulses: Normal pulses.   Pulmonary:      Effort: Pulmonary effort is normal.      Breath sounds: Normal breath sounds.   Abdominal:      General: Bowel sounds are normal.      Palpations: Abdomen is soft.   Musculoskeletal:         General: Normal range of motion.      Cervical back: Normal range of motion.      Right lower leg: No edema.      Left lower leg: No edema.   Skin:     General: Skin is warm and dry.      Capillary Refill: Capillary refill takes less than 2 seconds.   Neurological:      General: No focal deficit present.      Mental Status: She is alert.   Psychiatric:         Mood and Affect: Mood normal.         Behavior: Behavior normal.         Thought Content: Thought content normal.         Judgment: Judgment normal.         Emotional Behavior:   Normal   Debilities:  None  Results Review:    I reviewed the patient's new clinical results.  Lab Results  (most recent)     Procedure Component Value Units Date/Time    Basic Metabolic Panel [[id number]]  (Abnormal) Collected: 07/02/22 0724    Specimen: Blood Updated: 07/02/22 0951     Glucose 109 mg/dL      BUN 11 mg/dL      Creatinine 0.50 mg/dL      Sodium 140 mmol/L      Potassium 4.2 mmol/L      Comment: Slight hemolysis detected by analyzer. Results may be affected.        Chloride 105 mmol/L      CO2 19.0 mmol/L      Calcium 8.5 mg/dL      BUN/Creatinine Ratio 22.0     Anion Gap 16.0 mmol/L      eGFR 115.9 mL/min/1.73      Comment: National Kidney Foundation and American Society of Nephrology (ASN) Task Force recommended calculation based on the Chronic Kidney Disease Epidemiology Collaboration (CKD-EPI) equation refit without adjustment for race.       Narrative:      GFR Normal >60  Chronic Kidney Disease <60  Kidney Failure <15      Magnesium [[id number]]  (Normal) Collected: 07/02/22 0724    Specimen: Blood Updated: 07/02/22 0945     Magnesium 2.1 mg/dL     Lipid Panel [[id number]]  (Abnormal) Collected: 07/02/22 0724    Specimen: Blood from Arm, Left Updated: 07/02/22 0909     Total Cholesterol 184 mg/dL      Triglycerides 113 mg/dL      HDL Cholesterol 43 mg/dL      LDL Cholesterol  121 mg/dL      VLDL Cholesterol 20 mg/dL      LDL/HDL Ratio 2.75    Narrative:      Cholesterol Reference Ranges  (U.S. Department of Health and Human Services ATP III Classifications)    Desirable          <200 mg/dL  Borderline High    200-239 mg/dL  High Risk          >240 mg/dL      Triglyceride Reference Ranges  (U.S. Department of Health and Human Services ATP III Classifications)    Normal           <150 mg/dL  Borderline High  150-199 mg/dL  High             200-499 mg/dL  Very High        >500 mg/dL    HDL Reference Ranges  (U.S. Department of Health and Human Services ATP III Classifications)    Low     <40 mg/dl (major risk factor for CHD)  High    >60 mg/dl ('negative' risk factor for CHD)        LDL Reference  Ranges  (U.S. Department of Health and Human Services ATP III Classifications)    Optimal          <100 mg/dL  Near Optimal     100-129 mg/dL  Borderline High  130-159 mg/dL  High             160-189 mg/dL  Very High        >189 mg/dL    Troponin [[id number]]  (Normal) Collected: 07/02/22 0724    Specimen: Blood from Arm, Left Updated: 07/02/22 0754     Troponin T <0.010 ng/mL     Narrative:      Troponin T Reference Range:  <= 0.03 ng/mL-   Negative for AMI  >0.03 ng/mL-     Abnormal for myocardial necrosis.  Clinicians would have to utilize clinical acumen, EKG, Troponin and serial changes to determine if it is an Acute Myocardial Infarction or myocardial injury due to an underlying chronic condition.       Results may be falsely decreased if patient taking Biotin.      Respiratory Panel PCR w/COVID-19(SARS-CoV-2) OPAL/SAÚL/STEPHANI/PAD/COR/MAD/CARSON In-House, NP Swab in UTM/VTM, 3-4 HR TAT - Swab, Nasopharynx [[id number]]  (Normal) Collected: 07/01/22 1638    Specimen: Swab from Nasopharynx Updated: 07/01/22 1733     ADENOVIRUS, PCR Not Detected     Coronavirus 229E Not Detected     Coronavirus HKU1 Not Detected     Coronavirus NL63 Not Detected     Coronavirus OC43 Not Detected     COVID19 Not Detected     Human Metapneumovirus Not Detected     Human Rhinovirus/Enterovirus Not Detected     Influenza A PCR Not Detected     Influenza B PCR Not Detected     Parainfluenza Virus 1 Not Detected     Parainfluenza Virus 2 Not Detected     Parainfluenza Virus 3 Not Detected     Parainfluenza Virus 4 Not Detected     RSV, PCR Not Detected     Bordetella pertussis pcr Not Detected     Bordetella parapertussis PCR Not Detected     Chlamydophila pneumoniae PCR Not Detected     Mycoplasma pneumo by PCR Not Detected    Narrative:      In the setting of a positive respiratory panel with a viral infection PLUS a negative procalcitonin without other underlying concern for bacterial infection, consider observing off antibiotics or  discontinuation of antibiotics and continue supportive care. If the respiratory panel is positive for atypical bacterial infection (Bordetella pertussis, Chlamydophila pneumoniae, or Mycoplasma pneumoniae), consider antibiotic de-escalation to target atypical bacterial infection.    COVID PRE-OP / PRE-PROCEDURE SCREENING ORDER (NO ISOLATION) - Swab, Nasopharynx [[id number]]  (Normal) Collected: 07/01/22 1409    Specimen: Swab from Nasopharynx Updated: 07/01/22 1430    Narrative:      The following orders were created for panel order COVID PRE-OP / PRE-PROCEDURE SCREENING ORDER (NO ISOLATION) - Swab, Nasopharynx.  Procedure                               Abnormality         Status                     ---------                               -----------         ------                     COVID-19,CEPHEID/BRIAN,CO...[[id number]]  Normal              Final result                 Please view results for these tests on the individual orders.    COVID-19,CEPHEID/BRIAN,COR/STEPHANI/PAD/MYRON IN-HOUSE(OR EMERGENT/ADD-ON),NP SWAB IN TRANSPORT MEDIA 3-4 HR TAT, RT-PCR - Swab, Nasopharynx [[id number]]  (Normal) Collected: 07/01/22 1409    Specimen: Swab from Nasopharynx Updated: 07/01/22 1430     COVID19 Not Detected    Narrative:      Fact sheet for providers: https://www.fda.gov/media/661201/download     Fact sheet for patients: https://www.fda.gov/media/135547/download  Fact sheet for providers: https://www.fda.gov/media/325961/download    Fact sheet for patients: https://www.fda.gov/media/060419/download    Test performed by PCR.    Urinalysis With Microscopic If Indicated (No Culture) - Urine, Clean Catch [[id number]]  (Abnormal) Collected: 07/01/22 1132    Specimen: Urine, Clean Catch Updated: 07/01/22 1156     Color, UA Yellow     Appearance, UA Clear     pH, UA 6.0     Specific Gravity, UA 1.009     Glucose, UA Negative     Ketones, UA Negative     Bilirubin, UA Negative     Blood, UA Small (1+)     Protein, UA Negative     Leuk  Esterase, UA Negative     Nitrite, UA Negative     Urobilinogen, UA 0.2 E.U./dL    Urinalysis, Microscopic Only - Urine, Clean Catch [[id number]]  (Abnormal) Collected: 07/01/22 1132    Specimen: Urine, Clean Catch Updated: 07/01/22 1156     RBC, UA 0-2 /HPF      WBC, UA 0-2 /HPF      Bacteria, UA None Seen /HPF      Squamous Epithelial Cells, UA 0-2 /HPF      Hyaline Casts, UA None Seen /LPF      Methodology Automated Microscopy    Comprehensive Metabolic Panel [[id number]]  (Abnormal) Collected: 07/01/22 1126    Specimen: Blood Updated: 07/01/22 1150     Glucose 95 mg/dL      BUN 13 mg/dL      Creatinine 0.51 mg/dL      Sodium 137 mmol/L      Potassium 4.3 mmol/L      Chloride 102 mmol/L      CO2 22.0 mmol/L      Calcium 9.7 mg/dL      Total Protein 7.1 g/dL      Albumin 4.50 g/dL      ALT (SGPT) 29 U/L      AST (SGOT) 19 U/L      Alkaline Phosphatase 35 U/L      Total Bilirubin 0.4 mg/dL      Globulin 2.6 gm/dL      A/G Ratio 1.7 g/dL      BUN/Creatinine Ratio 25.5     Anion Gap 13.0 mmol/L      eGFR 115.3 mL/min/1.73      Comment: National Kidney Foundation and American Society of Nephrology (ASN) Task Force recommended calculation based on the Chronic Kidney Disease Epidemiology Collaboration (CKD-EPI) equation refit without adjustment for race.       Narrative:      GFR Normal >60  Chronic Kidney Disease <60  Kidney Failure <15      Lipase [[id number]]  (Normal) Collected: 07/01/22 1126    Specimen: Blood Updated: 07/01/22 1150     Lipase 33 U/L     Troponin [[id number]]  (Normal) Collected: 07/01/22 1126    Specimen: Blood Updated: 07/01/22 1150     Troponin T <0.010 ng/mL     Narrative:      Troponin T Reference Range:  <= 0.03 ng/mL-   Negative for AMI  >0.03 ng/mL-     Abnormal for myocardial necrosis.  Clinicians would have to utilize clinical acumen, EKG, Troponin and serial changes to determine if it is an Acute Myocardial Infarction or myocardial injury due to an underlying chronic condition.       Results  may be falsely decreased if patient taking Biotin.      Magnesium [[id number]]  (Normal) Collected: 07/01/22 1126    Specimen: Blood Updated: 07/01/22 1150     Magnesium 2.2 mg/dL     BNP [[id number]]  (Normal) Collected: 07/01/22 1126    Specimen: Blood Updated: 07/01/22 1148     proBNP 22.2 pg/mL     Narrative:      Among patients with dyspnea, NT-proBNP is highly sensitive for the detection of acute congestive heart failure. In addition NT-proBNP of <300 pg/ml effectively rules out acute congestive heart failure with 99% negative predictive value.    Results may be falsely decreased if patient taking Biotin.      D-dimer, Quantitative [[id number]]  (Normal) Collected: 07/01/22 1126    Specimen: Blood Updated: 07/01/22 1140     D-Dimer, Quantitative 0.46 mg/L (FEU)     Narrative:      Reference Range  --------------------------------------------------------------------     < 0.50   Negative Predictive Value  0.50-0.59   Indeterminate    >= 0.60   Probable VTE             A very low percentage of patients with DVT may yield D-Dimer results   below the cut-off of 0.50 mg/L FEU.  This is known to be more   prevalent in patients with distal DVT.             Results of this test should always be interpreted in conjunction with   the patient's medical history, clinical presentation and other   findings.  Clinical diagnosis should not be based on the result of   INNOVANCE D-Dimer alone.    CBC & Differential [[id number]]  (Abnormal) Collected: 07/01/22 1126    Specimen: Blood Updated: 07/01/22 1129    Narrative:      The following orders were created for panel order CBC & Differential.  Procedure                               Abnormality         Status                     ---------                               -----------         ------                     CBC Auto Differential[[id number]]        Abnormal            Final result                 Please view results for these tests on the individual orders.    CBC Auto  Differential [[id number]]  (Abnormal) Collected: 07/01/22 1126    Specimen: Blood Updated: 07/01/22 1129     WBC 9.40 10*3/mm3      RBC 4.29 10*6/mm3      Hemoglobin 14.1 g/dL      Hematocrit 40.6 %      MCV 94.7 fL      MCH 32.8 pg      MCHC 34.7 g/dL      RDW 12.6 %      RDW-SD 41.1 fl      MPV 8.6 fL      Platelets 293 10*3/mm3      Neutrophil % 50.8 %      Lymphocyte % 36.1 %      Monocyte % 7.0 %      Eosinophil % 5.2 %      Basophil % 0.9 %      Neutrophils, Absolute 4.80 10*3/mm3      Lymphocytes, Absolute 3.40 10*3/mm3      Monocytes, Absolute 0.70 10*3/mm3      Eosinophils, Absolute 0.50 10*3/mm3      Basophils, Absolute 0.10 10*3/mm3      nRBC 0.0 /100 WBC           Imaging Results (Most Recent)     Procedure Component Value Units Date/Time    CT Angiogram Chest Pulmonary Embolism [[id number]] Collected: 07/02/22 1525     Updated: 07/02/22 1532    Narrative:      CT ANGIOGRAM CHEST PULMONARY EMBOLISM-     Date of Exam: 7/2/2022 3:08 PM     Indication: Pulmonary embolism (PE) suspected, neg D-dimer; R07.9-Chest  pain, unspecified; R42-Dizziness and giddiness; R11.0-Nausea.  Chest  pain. Shortness of air.     Comparison: Chest radiograph 07/01/2022     Technique: Serial and axial CT images of the chest were obtained  following the uneventful intravenous administration of 100 cc Isovue  contrast. Reconstructions in the coronal and sagittal planes were also  performed. In addition, a 3 D volume rendered image was obtained after  post processing.  Automated exposure control and iterative  reconstruction methods were used.     FINDINGS:     Pulmonary Arteries: There is excellent opacification pulmonary arteries.  There are no intraluminal filling defects to suggest pulmonary artery  embolus. Pulmonary arteries have normal caliber.        Hilum and Mediastinum: No pathologically enlarged lymph nodes. Mild  cardiomegaly..  No significant coronary artery atherosclerotic disease.  Unremarkable thoracic aorta.   No  pericardial effusion. Small hiatal  hernia.     Lung Parenchyma and Pleura: No focal consolidations. Low lung volumes.  Mild bronchial wall thickening. Nonspecific diffuse groundglass opacity.  No suspicious pulmonary nodules.  No endobronchial lesions.  No  significant pleural effusions.     Upper Abdomen: Hepatomegaly. Status post cholecystectomy.     Soft tissues: Unremarkable.     Osseous structures: No aggressive focal lytic or sclerotic osseous  lesions.       Impression:      1.No evidence of pulmonary embolus.  2.Low lung volumes with diffuse bronchial wall thickening is nonspecific  groundglass opacity. This could be due to poor inspiration. This could  be seen with early infection or pulmonary edema.  3.Mild cardiomegaly.  4.Small hiatal hernia.  5.Hepatomegaly.              Electronically Signed By-Jessica Giang MD On:7/2/2022 3:30 PM  This report was finalized on [id number] by  Jessica Giang MD.    CT Head Without Contrast [[id number]] Collected: 07/01/22 1234     Updated: 07/01/22 1251    Narrative:      CT HEAD WO CONTRAST-     Date of Exam: 7/1/2022 12:23 PM     Indication: dizziness; R07.9-Chest pain, unspecified; R42-Dizziness and  giddiness; R11.0-Nausea.     Comparison: 12/01/2021     Technique:  Contiguous axial CT images of the head were obtained without  contrast from skull base to vertex.  Coronal and sagittal  reconstructions were performed.  Automated exposure control and  iterative reconstruction methods were used.       FINDINGS:  Brain/Ventricles: There is no acute intracranial hemorrhage, mass effect  or midline shift. No abnormal extra-axial fluid collection.  The  gray-white differentiation is maintained without evidence of an acute  infarct.  There is no evidence of hydrocephalus . Cerebellar tonsils are  low-lying.     Orbits: The visualized portion of the orbits demonstrate no acute  abnormality.     Sinuses:Chronic partial opacification of the paranasal sinuses again  noted. Mastoid  air cells are clear     Soft Tissues/Skull: No acute abnormality of the visualized skull or soft  tissues.       Impression:      No acute intracranial abnormality.     Chronic paranasal sinus disease     Electronically Signed By-Moo Arroyo On:7/1/2022 12:49 PM  This report was finalized on [id number] by  Moo Arroyo, .    XR Chest 1 View [[id number]] Collected: 07/01/22 1134     Updated: 07/01/22 1137    Narrative:      Examination: XR CHEST 1 VW-     Date of Exam: 7/1/2022 11:28 AM     Indication: cp.  Chest pain      Comparison: Radiograph 12/03/2021     Technique: 1 view of the chest      Findings:  There are no airspace consolidations. No pleural effusions. No  pneumothorax. The heart size is normal. The pulmonary vasculature  appears within normal limits. No acute osseous abnormality identified.       Impression:      No acute cardiopulmonary process.     Electronically Signed By-Susi Agarwal MD On:7/1/2022 11:35 AM  This report was finalized on [id number] by  Susi Agarwal MD.        reviewed    ECG/EMG Results (most recent)     Procedure Component Value Units Date/Time    ECG 12 Lead [[id number]] Collected: 07/01/22 1058     Updated: 07/01/22 1059     QT Interval 394 ms     Narrative:      HEART RATE= 79  bpm  RR Interval= 756  ms  OH Interval= 156  ms  P Horizontal Axis= -5  deg  P Front Axis= 42  deg  QRSD Interval= 87  ms  QT Interval= 394  ms  QRS Axis= 5  deg  T Wave Axis= 25  deg  - NORMAL ECG -  Sinus rhythm  Electronically Signed By:   Date and Time of Study: 2022-07-01 10:58:18    ECG 12 Lead [[id number]] Collected: 07/02/22 1420     Updated: 07/02/22 1421     QT Interval 357 ms     Narrative:      HEART RATE= 97  bpm  RR Interval= 616  ms  OH Interval= 141  ms  P Horizontal Axis= 26  deg  P Front Axis= 32  deg  QRSD Interval= 82  ms  QT Interval= 357  ms  QRS Axis= 10  deg  T Wave Axis= 35  deg  - NORMAL ECG -  Sinus rhythm  Electronically Signed By:   Date and Time of Study:  2022-07-02 14:20:01        reviewed            Microbiology Results (last 10 days)     Procedure Component Value - Date/Time    Respiratory Panel PCR w/COVID-19(SARS-CoV-2) [facility]AL/SA[facility]L/STEPHANI/PAD/COR/[facility] In-House, NP Swab in UTM/VTM, 3-4 HR TAT - Swab, Nasopharynx [[id number]]  (Normal) Collected: 07/01/22 1638    Lab Status: Final result Specimen: Swab from Nasopharynx Updated: 07/01/22 1733     ADENOVIRUS, PCR Not Detected     Coronavirus 229E Not Detected     Coronavirus HKU1 Not Detected     Coronavirus NL63 Not Detected     Coronavirus OC43 Not Detected     COVID19 Not Detected     Human Metapneumovirus Not Detected     Human Rhinovirus/Enterovirus Not Detected     Influenza A PCR Not Detected     Influenza B PCR Not Detected     Parainfluenza Virus 1 Not Detected     Parainfluenza Virus 2 Not Detected     Parainfluenza Virus 3 Not Detected     Parainfluenza Virus 4 Not Detected     RSV, PCR Not Detected     Bordetella pertussis pcr Not Detected     Bordetella parapertussis PCR Not Detected     Chlamydophila pneumoniae PCR Not Detected     Mycoplasma pneumo by PCR Not Detected    Narrative:      In the setting of a positive respiratory panel with a viral infection PLUS a negative procalcitonin without other underlying concern for bacterial infection, consider observing off antibiotics or discontinuation of antibiotics and continue supportive care. If the respiratory panel is positive for atypical bacterial infection (Bordetella pertussis, Chlamydophila pneumoniae, or Mycoplasma pneumoniae), consider antibiotic de-escalation to target atypical bacterial infection.    COVID PRE-OP / PRE-PROCEDURE SCREENING ORDER (NO ISOLATION) - Swab, Nasopharynx [[id number]]  (Normal) Collected: 07/01/22 1409    Lab Status: Final result Specimen: Swab from Nasopharynx Updated: 07/01/22 1430    Narrative:      The following orders were created for panel order COVID PRE-OP / PRE-PROCEDURE SCREENING ORDER (NO ISOLATION) - Swab,  Nasopharynx.  Procedure                               Abnormality         Status                     ---------                               -----------         ------                     COVID-19,CEPHEID/BRIAN,CO...[[id number]]  Normal              Final result                 Please view results for these tests on the individual orders.    COVID-19,CEPHEID/BRIAN,COR/STEPHANI/PAD/M[facility]ON IN-HOUSE(OR EMERGENT/ADD-ON),NP SWAB IN TRANSPORT MEDIA 3-4 HR TAT, RT-PCR - Swab, Nasopharynx [[id number]]  (Normal) Collected: 07/01/22 1409    Lab Status: Final result Specimen: Swab from Nasopharynx Updated: 07/01/22 1430     COVID19 Not Detected    Narrative:      Fact sheet for providers: https://www.fda.gov/media/290755/download     Fact sheet for patients: https://www.fda.gov/media/860851/download  Fact sheet for providers: https://www.fda.gov/media/594915/download    Fact sheet for patients: https://www.fda.gov/media/747416/download    Test performed by PCR.          Assessment & Plan     Chest pain       Chest pain  -Serial troponin: Less than 0.010,   -proBNP: 22.2  -D-dimer: 0.46  -Chest X-Ray: Chest x-ray shows no acute cardiopulmonary process  -EKG shows sinus rhythm at 79 without obvious acute ST changes or ectopy and a QTC of 4 and 53 ms  -healthy heart diet for now, NPO at midnight  -Check lipid panel  -81mg ASA daily  -Stress Test reported with a normal ECG stress test, normal myocardial perfusion imaging with no evidence of ischemia and a hyperdynamic EF greater than 70% consistent with a low risk study per interpreting provider  -GI was consulted following a severe episode of chest/back and right upper quadrant pain with a normal EGD reported and plans for outpatient esophageal manometry.  They also recommend potentially adding nitroglycerin for use when symptoms occur  -Telemetry     Hypertension  -Well controlled with a most recent blood pressure of 122/86  - Continue lisinopril  - Monitor while  admitted     Hyperlipidemia  -Statin     Depression  -Lexapro     Obesity (BMI: 30.47)  -Encourage diet lifestyle modification    I discussed the patients findings and my recommendations with patient and nursing staff.     Discharge Diagnosis:      Chest pain      Hospital Course  Patient is a 47 y.o. female presented with chest pain and HPI noted above.  Serial troponins remain less than 0.010 and proBNP was within normal limits at 22.2.  D-dimer was initially found to be 0.46 in the ED with subsequent chest x-ray showing no acute cardiopulmonary process.  EKG also showed no acute changes.  She was continued on telemetry throughout her admission without significant events noted overnight and patient did continue to report some right-sided chest pain radiating towards her back just below the superior portion of her scapula.  Stress testing was performed on 7/2/2022 and was reported as low risk by interpreting physician.  Patient had a severe episode of chest/back and epigastric pain following stress testing.  She was given IV labetalol as well as IV morphine.  Subsequent CT PE protocol showing no PE or aortic pathology.  Given the observed severity of this episode GI was consulted who evaluated patient and performed EGD which was noted as normal on 7/3/2022 with plans for outpatient esophageal manometry and recommendation for nitroglycerin use if symptoms recur.  At this time patient felt to be in good condition for discharge with close follow-up with her PCP and GI on an outpatient basis.  Her full testing/results and plan were discussed with patient with concerning/alarm symptoms which to call 911/return to the ED. nitroglycerin and a short course of Norco will be prescribed at discharge.  All questions were answered she verbalizes her understanding and agreement.    Past Medical History:     Past Medical History:   Diagnosis Date   • Anxiety    • COVID    • GERD (gastroesophageal reflux disease)    • Hypertension         Past Surgical History:     Past Surgical History:   Procedure Laterality Date   •  SECTION      X2   • CHOLECYSTECTOMY     • HERNIA REPAIR      X3   • HYSTERECTOMY     • RECTAL POLYPECTOMY         Social History:   Social History     Socioeconomic History   • Marital status:    Tobacco Use   • Smoking status: Never Smoker   • Smokeless tobacco: Never Used   Substance and Sexual Activity   • Alcohol use: Yes     Comment: SOCIAL   • Drug use: Never   • Sexual activity: Yes       Procedures Performed     [facility]    Consults:   Consults     Date and Time Order Name Status Description    2022  4:17 PM Inpatient Gastroenterology Consult            Condition on Discharge:     Stable    Discharge Disposition  Home or Self Care    Discharge Medications     Discharge Medications      New Medications      Instructions Start Date   HYDROcodone-acetaminophen 5-325 MG per tablet  Commonly known as: NORCO   1 tablet, Oral, Every 6 Hours PRN      nitroglycerin 0.4 MG SL tablet  Commonly known as: NITROSTAT   0.4 mg, Sublingual, Every 5 Minutes PRN, Take no more than 3 doses in 15 minutes.         Continue These Medications      Instructions Start Date   atorvastatin 40 MG tablet  Commonly known as: LIPITOR   Take 1 tablet by mouth once daily      b complex-C-folic acid 1 MG capsule   1 capsule, Oral, Daily      lisinopril 10 MG tablet  Commonly known as: PRINIVIL,ZESTRIL   10 mg, Oral, Daily      multivitamin with minerals tablet tablet   1 tablet, Oral, Daily             Discharge Diet:     Activity at Discharge:   Activity Instructions     Driving Restrictions      Type of Restriction: Driving    Driving Restrictions: No Driving While Taking Narcotics    Work Restrictions      Type of Restriction: Work    May Return to Work: Specific Date    Return To Work Date: 2022    With / Without Restrictions: Without Restrictions          Follow-up Appointments  No future  appointments.  Additional Instructions for the Follow-ups that You Need to Schedule     Discharge Follow-up with PCP   As directed       Currently Documented PCP:    Kathia Cooper MD    PCP Phone Number:    [phone]     Follow Up Details: 5 to 7 days         Discharge Follow-up with Specified Provider: GI; 1 Month   As directed      To: GI    Follow Up: 1 Month               Test Results Pending at Discharge       Risk for Readmission (LACE) Score: 2 (7/3/2022  6:00 AM)          Trenton Fortune PA-C  07/03/22  15:40 EDT

## 2022-07-02 NOTE — PROGRESS NOTES
Former[facility] Observation Unit progress note    Patient Name: Evelyn Garcia  : 1975  MRN: [id number]  Primary Care Physician: Kathia Cooper MD  Date of admission: 2022     Patient Care Team:  Kathia Cooper MD as PCP - General          Subjective   History Present Illness     Chief Complaint:   Chief Complaint   Patient presents with   • Chest Pain         Obtained from ED provider HPI on 2022:  Patient is a 47-year-old obese  female who presents today with complaints of midsternal chest pain, dizziness and nausea that began this morning.  She reports a history of GERD but does not believe that this pain feels like indigestion.  She states that pain occurs midsternal but radiates to her right chest.  She states earlier this morning it also radiated to her back but that has since resolved.  She rates the pain 5/10 and describes as a dull/heavy pain.  She cannot identify any relieving or aggravating factors.  She states she does not have any cardiac history but she has family history of cardiac issues in her mother and father.  She denies vomiting, shortness of breath, fever and dysuria.  She reports a history of hypertension, hypercholesteremia, and GERD.  She denies drug and alcohol use, as well as tobacco.     2022 (postobservation admission): Patient confirms the HPI noted above including a sudden onset of substernal right-sided chest pain described as a pressure with severe associated nausea without vomiting which occurred on the morning of presentation while patient was at work but without any significant exertion.  She notes the pain radiated towards her back without obvious provoking or palliative factors.  She denies any dyspnea, fever, palpitations, peripheral edema, syncope or near syncope.  She has had a mildly productive cough for some time now.  Family history is notable for CAD in both her mother, father as well as all of her grandparents.  No previous cardiac work-up has  been performed.  She does have a history of cholecystectomy.     2022: Patient reports she did fairly well throughout the night though she does continue to have some right-sided chest pain that radiates towards her back which seems to be worse with deep breathing.    Patient underwent stress testing with a fairly unremarkable course however she was reevaluated posttesting noted continued and somewhat evolving pain on the right side of her chest in the lower portion radiating towards her back discussed this with patient and family and had agreed to obtain a CT PE protocol.  While waiting for this test patient had several episodes of profound right-sided chest and right upper quadrant pain radiating towards her back with diaphoresis and hypertension noted      Review of Systems   Constitutional: Positive for diaphoresis.   HENT: Negative.    Eyes: Negative.    Cardiovascular: Positive for chest pain.   Respiratory: Negative.    Skin: Negative.    Musculoskeletal: Negative.    Gastrointestinal: Positive for abdominal pain.   Genitourinary: Negative.    Neurological: Negative.    Psychiatric/Behavioral: Negative.            Personal History     Past Medical History:   Past Medical History:   Diagnosis Date   • Anxiety    • COVID    • GERD (gastroesophageal reflux disease)    • Hypertension        Surgical History:      Past Surgical History:   Procedure Laterality Date   •  SECTION      X2   • CHOLECYSTECTOMY     • HERNIA REPAIR      X3   • HYSTERECTOMY     • RECTAL POLYPECTOMY             Family History: family history includes Heart disease in her father and mother. Otherwise pertinent FHx was reviewed and unremarkable.     Social History:  reports that she has never smoked. She has never used smokeless tobacco. She reports current alcohol use. She reports that she does not use drugs.      Medications:  Prior to Admission medications    Medication Sig Start Date End Date Taking? Authorizing Provider    atorvastatin (LIPITOR) 40 MG tablet Take 1 tablet by mouth once daily 11/21/21  Yes Kathia Cooper MD   b complex-C-folic acid 1 MG capsule Take 1 capsule by mouth Daily.   Yes Provider, MD Monica   lisinopril (PRINIVIL,ZESTRIL) 10 MG tablet Take 1 tablet by mouth Daily. 11/10/21  Yes Kathia Cooper MD   Multiple Vitamins-Minerals (MULTIVITAMIN ADULT PO) Take 1 tablet by mouth Daily.   Yes Emergency, Nurse Evelyn, RN       Allergies:    Allergies   Allergen Reactions   • Butorphanol Unknown - High Severity     Slows breathing-unresponsive     • Drug [Casirivimab-Imdevimab] Other (See Comments)     Patient hot, nauseated, throat swelling, chest pain, headache,shaking uncontrollably   • Hydromorphone Hcl Unknown - High Severity     Slows breathing -unresponsive   • Monoclonal Antibodies Anaphylaxis     Patient had monoclonal antibody infusion and stated she was sent to er with reaction   • Nalbuphine Unknown - High Severity   • Latex, Natural Rubber Hives       Objective   Objective     Vital Signs  Temp:  [97.9 °F (36.6 °C)-98.2 °F (36.8 °C)] 97.9 °F (36.6 °C)  Heart Rate:  [] 90  Resp:  [17-18] 18  BP: (113-149)/(73-93) 118/76  SpO2:  [94 %-100 %] 100 %  on  Flow (L/min):  [4] 4;   Device (Oxygen Therapy): nasal cannula  Body mass index is 31.25 kg/m².    Physical Exam  Constitutional:       General: She is not in acute distress.     Appearance: Normal appearance. She is not ill-appearing, toxic-appearing or diaphoretic.   HENT:      Head: Normocephalic.      Right Ear: External ear normal.      Left Ear: External ear normal.      Nose: Nose normal.      Mouth/Throat:      Mouth: Mucous membranes are moist.   Eyes:      Extraocular Movements: Extraocular movements intact.   Cardiovascular:      Rate and Rhythm: Normal rate and regular rhythm.      Pulses: Normal pulses.   Pulmonary:      Effort: Pulmonary effort is normal.      Breath sounds: Normal breath sounds.   Abdominal:      General: Bowel  sounds are normal.      Palpations: Abdomen is soft.      Tenderness: There is abdominal tenderness.   Musculoskeletal:      Cervical back: Normal range of motion.      Right lower leg: No edema.      Left lower leg: No edema.   Skin:     General: Skin is warm and dry.      Capillary Refill: Capillary refill takes less than 2 seconds.   Neurological:      General: No focal deficit present.      Mental Status: She is alert.   Psychiatric:         Mood and Affect: Mood normal.         Behavior: Behavior normal.         Thought Content: Thought content normal.         Judgment: Judgment normal.           Results Review:  I have personally reviewed most recent cardiac tracings, lab results and radiology images and interpretations and agree with findings, most notably: Troponin, CBC, CMP, chest x-ray, EKG, stress test and CT PE protocol.    Results from last 7 days   Lab Units 07/01/22  1126   WBC 10*3/mm3 9.40   HEMOGLOBIN g/dL 14.1   HEMATOCRIT % 40.6   PLATELETS 10*3/mm3 293     Results from last 7 days   Lab Units 07/02/22  1440 07/02/22  0724 07/01/22  1126   SODIUM mmol/L  --  140 137   POTASSIUM mmol/L 3.9 4.2 4.3   CHLORIDE mmol/L  --  105 102   CO2 mmol/L  --  19.0* 22.0   BUN mg/dL  --  11 13   CREATININE mg/dL  --  0.50* 0.51*   GLUCOSE mg/dL  --  109* 95   CALCIUM mg/dL  --  8.5* 9.7   ALT (SGPT) U/L  --   --  29   AST (SGOT) U/L  --   --  19   TROPONIN T ng/mL <0.010 <0.010 <0.010   PROBNP pg/mL  --   --  22.2     Estimated Creatinine Clearance: 122.3 mL/min (A) (by C-G formula based on SCr of 0.5 mg/dL (L)).  Brief Urine Lab Results  (Last result in the past 365 days)      Color   Clarity   Blood   Leuk Est   Nitrite   Protein   CREAT   Urine HCG        07/01/22 1132 Yellow   Clear   Small (1+)   Negative   Negative   Negative                 Microbiology Results (last 10 days)     Procedure Component Value - Date/Time    Respiratory Panel PCR w/COVID-19(SARS-CoV-2) OPAL/[facility]/STEPHANI/PAD/COR/[facility] In-House, NP  Swab in UTM/VTM, 3-4 HR TAT - Swab, Nasopharynx [[id number]]  (Normal) Collected: 07/01/22 1638    Lab Status: Final result Specimen: Swab from Nasopharynx Updated: 07/01/22 1733     ADENOVIRUS, PCR Not Detected     Coronavirus 229E Not Detected     Coronavirus HKU1 Not Detected     Coronavirus NL63 Not Detected     Coronavirus OC43 Not Detected     COVID19 Not Detected     Human Metapneumovirus Not Detected     Human Rhinovirus/Enterovirus Not Detected     Influenza A PCR Not Detected     Influenza B PCR Not Detected     Parainfluenza Virus 1 Not Detected     Parainfluenza Virus 2 Not Detected     Parainfluenza Virus 3 Not Detected     Parainfluenza Virus 4 Not Detected     RSV, PCR Not Detected     Bordetella pertussis pcr Not Detected     Bordetella parapertussis PCR Not Detected     Chlamydophila pneumoniae PCR Not Detected     Mycoplasma pneumo by PCR Not Detected    Narrative:      In the setting of a positive respiratory panel with a viral infection PLUS a negative procalcitonin without other underlying concern for bacterial infection, consider observing off antibiotics or discontinuation of antibiotics and continue supportive care. If the respiratory panel is positive for atypical bacterial infection (Bordetella pertussis, Chlamydophila pneumoniae, or Mycoplasma pneumoniae), consider antibiotic de-escalation to target atypical bacterial infection.    COVID PRE-OP / PRE-PROCEDURE SCREENING ORDER (NO ISOLATION) - Swab, Nasopharynx [[id number]]  (Normal) Collected: 07/01/22 1409    Lab Status: Final result Specimen: Swab from Nasopharynx Updated: 07/01/22 1430    Narrative:      The following orders were created for panel order COVID PRE-OP / PRE-PROCEDURE SCREENING ORDER (NO ISOLATION) - Swab, Nasopharynx.  Procedure                               Abnormality         Status                     ---------                               -----------         ------                      COVID-19,CEPHEID/BRIAN,CO...[[id number]]  Normal              Final result                 Please view results for these tests on the individual orders.    COVID-19,CEPHEID/BRIAN,COR/STEPHANI/PAD/[facility] IN-HOUSE(OR EMERGENT/ADD-ON),NP SWAB IN TRANSPORT MEDIA 3-4 HR TAT, RT-PCR - Swab, Nasopharynx [[id number]]  (Normal) Collected: 07/01/22 1409    Lab Status: Final result Specimen: Swab from Nasopharynx Updated: 07/01/22 1430     COVID19 Not Detected    Narrative:      Fact sheet for providers: https://www.fda.gov/media/129151/download     Fact sheet for patients: https://www.fda.gov/media/200032/download  Fact sheet for providers: https://www.fda.gov/media/893295/download    Fact sheet for patients: https://www.fda.gov/media/794774/download    Test performed by PCR.          ECG/EMG Results (most recent)     Procedure Component Value Units Date/Time    ECG 12 Lead [[id number]] Collected: 07/01/22 1058     Updated: 07/01/22 1059     QT Interval 394 ms     Narrative:      HEART RATE= 79  bpm  RR Interval= 756  ms  IA Interval= 156  ms  P Horizontal Axis= -5  deg  P Front Axis= 42  deg  QRSD Interval= 87  ms  QT Interval= 394  ms  QRS Axis= 5  deg  T Wave Axis= 25  deg  - NORMAL ECG -  Sinus rhythm  Electronically Signed By:   Date and Time of Study: 2022-07-01 10:58:18    ECG 12 Lead [[id number]] Collected: 07/02/22 1420     Updated: 07/02/22 1421     QT Interval 357 ms     Narrative:      HEART RATE= 97  bpm  RR Interval= 616  ms  IA Interval= 141  ms  P Horizontal Axis= 26  deg  P Front Axis= 32  deg  QRSD Interval= 82  ms  QT Interval= 357  ms  QRS Axis= 10  deg  T Wave Axis= 35  deg  - NORMAL ECG -  Sinus rhythm  Electronically Signed By:   Date and Time of Study: 2022-07-02 14:20:01                  CT Head Without Contrast    Result Date: 7/1/2022  No acute intracranial abnormality.  Chronic paranasal sinus disease  Electronically Signed By-Moo Arroyo On:7/1/2022 12:49 PM This report was finalized on [id number] by   Moo Arroyo, .    XR Chest 1 View    Result Date: 7/1/2022  No acute cardiopulmonary process.  Electronically Signed By-Susi Agarwal MD On:7/1/2022 11:35 AM This report was finalized on [id number] by  Susi Agarwal MD.    CT Angiogram Chest Pulmonary Embolism    Result Date: 7/2/2022  1.No evidence of pulmonary embolus. 2.Low lung volumes with diffuse bronchial wall thickening is nonspecific groundglass opacity. This could be due to poor inspiration. This could be seen with early infection or pulmonary edema. 3.Mild cardiomegaly. 4.Small hiatal hernia. 5.Hepatomegaly.     Electronically Signed By-Jessica Giang MD On:7/2/2022 3:30 PM This report was finalized on [id number] by  Jessica Giang MD.        Estimated Creatinine Clearance: 122.3 mL/min (A) (by C-G formula based on SCr of 0.5 mg/dL (L)).    Assessment & Plan   Assessment/Plan       Active Hospital Problems    Diagnosis  POA   • Chest pain [R07.9]  Yes      Resolved Hospital Problems   No resolved problems to display.     Chest pain  -Serial troponin: Less than 0.010,   -proBNP: 22.2  -D-dimer: 0.46  -Chest X-Ray: Chest x-ray shows no acute cardiopulmonary process  -EKG shows sinus rhythm at 79 without obvious acute ST changes or ectopy and a QTC of 4 and 53 ms  -healthy heart diet for now, NPO at midnight  -Check lipid panel  -81mg ASA daily  -Stress Test reported with a normal ECG stress test, normal myocardial perfusion imaging with no evidence of ischemia and a hyperdynamic EF greater than 70% consistent with a low risk study per interpreting provider  -While waiting transport for PE protocol patient had a severe episode of right-sided chest and right upper quadrant pain that radiated to her back with significant amount of diaphoresis and hypertension noted at the same time.  She was given IV morphine x2 along with 10 mg of labetalol with improvement in pain reported to a moderate and tolerable level though no complete resolution  -CT PE protocol  showed no evidence of pulmonary embolism with diffuse groundglass opacities noted possibly related to early infection or pulmonary edema though radiologist does note this may be due to low lung volumes present for this exam.  Mild cardiomegaly along with a small hiatal hernia and hepatomegaly is also present  -GI consulted and patient due to intensity of patient's symptoms observed  -Telemetry     Hypertension  -Well controlled with a most recent blood pressure of 122/86  - Continue lisinopril  - Monitor while admitted     Hyperlipidemia  -Statin     Depression  -Lexapro     Obesity (BMI: 30.47)  -Encourage diet lifestyle modification            VTE Prophylaxis -   Mechanical Order History:     None      Pharmalogical Order History:      Ordered     Dose Route Frequency Stop    07/02/22 0701  Enoxaparin Sodium (LOVENOX) syringe 40 mg         40 mg SC Every 24 Hours --                CODE STATUS:    Code Status and Medical Interventions:   Ordered at: 07/01/22 1319     Code Status (Patient has no pulse and is not breathing):    CPR (Attempt to Resuscitate)     Medical Interventions (Patient has pulse or is breathing):    Full Support       This patient has been examined wearing personal protective equipment.     I discussed the patient's findings and my recommendations with patient and nursing staff.      Signature:Electronically signed by Trenton Fortune PA-C, 07/02/22, 4:16 PM EDT.

## 2022-07-02 NOTE — PLAN OF CARE
Problem: Adult Inpatient Plan of Care  Goal: Absence of Hospital-Acquired Illness or Injury  Intervention: Identify and Manage Fall Risk  Recent Flowsheet Documentation  Taken 7/1/2022 2000 by Andrés Marte RN  Safety Promotion/Fall Prevention: safety round/check completed  Intervention: Prevent and Manage VTE (Venous Thromboembolism) Risk  Recent Flowsheet Documentation  Taken 7/1/2022 2000 by Andrés Marte RN  Range of Motion: active ROM (range of motion) encouraged   Goal Outcome Evaluation:      Discussed plan of care with pt. Pt stated understanding about current plan.

## 2022-07-03 ENCOUNTER — ON CAMPUS - OUTPATIENT (OUTPATIENT)
Dept: URBAN - METROPOLITAN AREA HOSPITAL 85 | Facility: HOSPITAL | Age: 47
End: 2022-07-03

## 2022-07-03 ENCOUNTER — ANESTHESIA EVENT (OUTPATIENT)
Dept: GASTROENTEROLOGY | Facility: HOSPITAL | Age: 47
End: 2022-07-03

## 2022-07-03 ENCOUNTER — READMISSION MANAGEMENT (OUTPATIENT)
Dept: CALL CENTER | Facility: HOSPITAL | Age: 47
End: 2022-07-03

## 2022-07-03 ENCOUNTER — ANESTHESIA (OUTPATIENT)
Dept: GASTROENTEROLOGY | Facility: HOSPITAL | Age: 47
End: 2022-07-03

## 2022-07-03 VITALS
TEMPERATURE: 97.9 F | DIASTOLIC BLOOD PRESSURE: 79 MMHG | RESPIRATION RATE: 16 BRPM | BODY MASS INDEX: 31.41 KG/M2 | WEIGHT: 160 LBS | SYSTOLIC BLOOD PRESSURE: 126 MMHG | HEIGHT: 60 IN | OXYGEN SATURATION: 98 % | HEART RATE: 86 BPM

## 2022-07-03 DIAGNOSIS — R07.89 OTHER CHEST PAIN: ICD-10-CM

## 2022-07-03 LAB
BASOPHILS # BLD AUTO: 0.1 10*3/MM3 (ref 0–0.2)
BASOPHILS NFR BLD AUTO: 0.6 % (ref 0–1.5)
DEPRECATED RDW RBC AUTO: 41.1 FL (ref 37–54)
EOSINOPHIL # BLD AUTO: 0.5 10*3/MM3 (ref 0–0.4)
EOSINOPHIL NFR BLD AUTO: 4.7 % (ref 0.3–6.2)
ERYTHROCYTE [DISTWIDTH] IN BLOOD BY AUTOMATED COUNT: 12.5 % (ref 12.3–15.4)
HCT VFR BLD AUTO: 37.8 % (ref 34–46.6)
HGB BLD-MCNC: 13.2 G/DL (ref 12–15.9)
LYMPHOCYTES # BLD AUTO: 4.3 10*3/MM3 (ref 0.7–3.1)
LYMPHOCYTES NFR BLD AUTO: 41.8 % (ref 19.6–45.3)
MCH RBC QN AUTO: 32.9 PG (ref 26.6–33)
MCHC RBC AUTO-ENTMCNC: 35 G/DL (ref 31.5–35.7)
MCV RBC AUTO: 94.1 FL (ref 79–97)
MONOCYTES # BLD AUTO: 0.8 10*3/MM3 (ref 0.1–0.9)
MONOCYTES NFR BLD AUTO: 8.2 % (ref 5–12)
NEUTROPHILS NFR BLD AUTO: 4.6 10*3/MM3 (ref 1.7–7)
NEUTROPHILS NFR BLD AUTO: 44.7 % (ref 42.7–76)
NRBC BLD AUTO-RTO: 0.1 /100 WBC (ref 0–0.2)
PLATELET # BLD AUTO: 268 10*3/MM3 (ref 140–450)
PMV BLD AUTO: 8.9 FL (ref 6–12)
RBC # BLD AUTO: 4.02 10*6/MM3 (ref 3.77–5.28)
WBC NRBC COR # BLD: 10.3 10*3/MM3 (ref 3.4–10.8)

## 2022-07-03 PROCEDURE — G0378 HOSPITAL OBSERVATION PER HR: HCPCS

## 2022-07-03 PROCEDURE — 43235 EGD DIAGNOSTIC BRUSH WASH: CPT | Performed by: INTERNAL MEDICINE

## 2022-07-03 PROCEDURE — 25010000002 PROPOFOL 200 MG/20ML EMULSION: Performed by: ANESTHESIOLOGY

## 2022-07-03 PROCEDURE — 85025 COMPLETE CBC W/AUTO DIFF WBC: CPT | Performed by: PHYSICIAN ASSISTANT

## 2022-07-03 PROCEDURE — 96376 TX/PRO/DX INJ SAME DRUG ADON: CPT

## 2022-07-03 RX ORDER — HYDROCODONE BITARTRATE AND ACETAMINOPHEN 5; 325 MG/1; MG/1
1 TABLET ORAL EVERY 6 HOURS PRN
Qty: 12 TABLET | Refills: 0 | Status: SHIPPED | OUTPATIENT
Start: 2022-07-03 | End: 2022-07-06

## 2022-07-03 RX ORDER — NITROGLYCERIN 0.4 MG/1
0.4 TABLET SUBLINGUAL
Qty: 25 TABLET | Refills: 0 | Status: SHIPPED | OUTPATIENT
Start: 2022-07-03

## 2022-07-03 RX ORDER — PROPOFOL 10 MG/ML
INJECTION, EMULSION INTRAVENOUS AS NEEDED
Status: DISCONTINUED | OUTPATIENT
Start: 2022-07-03 | End: 2022-07-03 | Stop reason: SURG

## 2022-07-03 RX ORDER — PANTOPRAZOLE SODIUM 40 MG/10ML
40 INJECTION, POWDER, LYOPHILIZED, FOR SOLUTION INTRAVENOUS
Status: DISCONTINUED | OUTPATIENT
Start: 2022-07-03 | End: 2022-07-03 | Stop reason: HOSPADM

## 2022-07-03 RX ORDER — HYDROCODONE BITARTRATE AND ACETAMINOPHEN 5; 325 MG/1; MG/1
1 TABLET ORAL EVERY 6 HOURS PRN
Status: DISCONTINUED | OUTPATIENT
Start: 2022-07-03 | End: 2022-07-03 | Stop reason: HOSPADM

## 2022-07-03 RX ORDER — SODIUM CHLORIDE 9 MG/ML
INJECTION, SOLUTION INTRAVENOUS CONTINUOUS PRN
Status: DISCONTINUED | OUTPATIENT
Start: 2022-07-03 | End: 2022-07-03 | Stop reason: SURG

## 2022-07-03 RX ORDER — ONDANSETRON 2 MG/ML
4 INJECTION INTRAMUSCULAR; INTRAVENOUS EVERY 6 HOURS PRN
Status: DISCONTINUED | OUTPATIENT
Start: 2022-07-03 | End: 2022-07-03 | Stop reason: SDUPTHER

## 2022-07-03 RX ORDER — ONDANSETRON 4 MG/1
4 TABLET, FILM COATED ORAL EVERY 6 HOURS PRN
Status: DISCONTINUED | OUTPATIENT
Start: 2022-07-03 | End: 2022-07-03 | Stop reason: SDUPTHER

## 2022-07-03 RX ADMIN — HYDROCODONE BITARTRATE AND ACETAMINOPHEN 1 TABLET: 5; 325 TABLET ORAL at 16:46

## 2022-07-03 RX ADMIN — PROPOFOL 140 MG: 10 INJECTION, EMULSION INTRAVENOUS at 12:02

## 2022-07-03 RX ADMIN — Medication 10 ML: at 11:09

## 2022-07-03 RX ADMIN — PANTOPRAZOLE SODIUM 40 MG: 40 INJECTION, POWDER, FOR SOLUTION INTRAVENOUS at 11:09

## 2022-07-03 RX ADMIN — SODIUM CHLORIDE: 0.9 INJECTION, SOLUTION INTRAVENOUS at 12:02

## 2022-07-03 RX ADMIN — LISINOPRIL 10 MG: 5 TABLET ORAL at 11:09

## 2022-07-03 NOTE — ANESTHESIA POSTPROCEDURE EVALUATION
Patient: Evelyn Garcia    Procedure Summary     Date: 07/03/22 Room / Location: [facility] / [facility]    Anesthesia Start: 1200 Anesthesia Stop: 1214    Procedure: ESOPHAGOGASTRODUODENOSCOPY (N/A ) Diagnosis:       Stable angina pectoris (HCC)      (Stable angina pectoris (HCC) [I20.8])    Surgeons: Ashly Coronado MD Provider: Henrik Ledezma MD    Anesthesia Type: MAC ASA Status: 3          Anesthesia Type: MAC    Vitals  Vitals Value Taken Time   /74 07/03/22 1247   Temp     Pulse 69 07/03/22 1246   Resp 11 07/03/22 1214   SpO2 92 % 07/03/22 1246   Vitals shown include unvalidated device data.        Post Anesthesia Care and Evaluation    Patient location during evaluation: PACU  Patient participation: complete - patient participated  Level of consciousness: awake  Pain scale: See nurse's notes for pain score.  Pain management: adequate    Airway patency: patent  Anesthetic complications: No anesthetic complications  PONV Status: none  Cardiovascular status: acceptable  Respiratory status: acceptable  Hydration status: acceptable    Comments: Patient seen and examined postoperatively; vital signs stable; SpO2 greater than or equal to 90%; cardiopulmonary status stable; nausea/vomiting adequately controlled; pain adequately controlled; no apparent anesthesia complications; patient discharged from anesthesia care when discharge criteria were met

## 2022-07-03 NOTE — CONSULTS
GI CONSULT  NOTE:    Referring Provider:    Dr Avitia     Chief complaint:   Midsternal and right-sided chest pain    Subjective    Chest pain, dizziness and nausea    History of present illness:    Patient is a 47-year-old female with a history of GERD, hypertension, hyper cholesterolemia, anxiety, depression and cholecystectomy who presented to the ER on 7/1/2022 with midsternal to right-sided chest pain, dizziness and nausea.  Patient was evaluated in the ER had cardiac work-up and is still having pain.  GI consult was prompted.  Patient states she has been taking ibuprofen twice a day for several months for pain.  Patient does have history of reflux and takes Nexium over-the-counter once a day.  She has had nausea but no vomiting.  Patient states she has formed to soft stools every day.  Denies any constipation.  Denies any melena hematochezia or bright red blood per rectum.  Patient states she has possibly has had an EGD 15 years ago but no known ulcers.   states that she took Carafate at 1 time but stopped taking due to dislike and taste.  Patient has had her gallbladder out many years ago due to biliary dysfunction.  Patient states she has not had episodes of chest pain radiating up into her neck like this previously.  Her symptoms began on Friday, 7/1/2022 about midmorning when she was at work.  Patient states she described it as a heaviness that became worse.  It eventually came very sharp and felt as if she was going to pass out if the pain did not cease.  Pain waxes and wanes.  No shortness of breath with it.  She did note her blood pressure was elevated at work at 153/108 when she was having the pain.  States she has taken GI cocktails in the past which helped with the pain but states that this pain is totally different.  Patient's had worst episode of pain yesterday afternoon after stress test. Got 4 mg morphine.   Labs: Sodium and potassium are normal.  Creatinine 0.50.  LFTs are normal.   Magnesium level is 2.4.  Lipase 33.  WBCs 10.3, hemoglobin 13.2, platelets 268.  CT PE protocol negative.    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    Endo History:  Nothing in G med.  Patient states she had colon polyps at the age of 11 that were found when she had rectal bleeding.  Patient states she had multiple colonoscopies after that.  Admits to not having a colonoscopy for over 15 years and her last one was at [facility].  Patient does not know if she had polyps on her last colon.  Patient possibly had an EGD 15 years ago and questionable hiatal hernia      Past Medical History:  Past Medical History:   Diagnosis Date   • Anxiety    • COVID    • GERD (gastroesophageal reflux disease)    • Hypertension        Past Surgical History:  Past Surgical History:   Procedure Laterality Date   •  SECTION      X2   • CHOLECYSTECTOMY     • HERNIA REPAIR      X3   • HYSTERECTOMY     • RECTAL POLYPECTOMY         Social History:  Social History     Tobacco Use   • Smoking status: Never Smoker   • Smokeless tobacco: Never Used   Substance Use Topics   • Alcohol use: Yes     Comment: SOCIAL   • Drug use: Never       Family History: Patient denies any family history of GI difficulties or cancers.  Family History   Problem Relation Age of Onset   • Heart disease Mother    • Heart disease Father        Medications:  Medications Prior to Admission   Medication Sig Dispense Refill Last Dose   • atorvastatin (LIPITOR) 40 MG tablet Take 1 tablet by mouth once daily 90 tablet 3 2022 at Unknown time   • b complex-C-folic acid 1 MG capsule Take 1 capsule by mouth Daily.   2022 at Unknown time   • lisinopril (PRINIVIL,ZESTRIL) 10 MG tablet Take 1 tablet by mouth Daily. 90 tablet 1 2022 at Unknown time   • Multiple Vitamins-Minerals (MULTIVITAMIN ADULT PO) Take 1 tablet by mouth Daily.   2022 at Unknown time       Scheduled Meds:atorvastatin, 40 mg, Oral, Nightly  enoxaparin, 40 mg, Subcutaneous,  Q24H  lisinopril, 10 mg, Oral, Daily  pantoprazole, 40 mg, Intravenous, Q AM  sodium chloride, 10 mL, Intravenous, Q12H      Continuous Infusions:   PRN Meds:.•  aluminum-magnesium hydroxide-simethicone  •  melatonin  •  naloxone  •  nitroglycerin  •  ondansetron **OR** ondansetron  •  [COMPLETED] Insert peripheral IV **AND** sodium chloride  •  sodium chloride    ALLERGIES:  Butorphanol; Drug [casirivimab-imdevimab]; Hydromorphone hcl; Monoclonal antibodies; Nalbuphine; and Latex, natural rubber    ROS:  Review of Systems   Cardiovascular: Positive for chest pain.   Gastrointestinal: Positive for diarrhea and nausea. Negative for abdominal distention, abdominal pain, anal bleeding, blood in stool, constipation, rectal pain and vomiting.   Musculoskeletal: Positive for back pain.   Psychiatric/Behavioral: The patient is nervous/anxious.        The following systems were reviewed and negative;   Constitution:  No fevers, chills, no unintentional weight loss  Skin: no rash, no jaundice  Eyes:  No blurry vision, no eye pain  HENT:  No change in hearing or smell  Resp:  No dyspnea or cough  CV:  No  palpitations  :  No dysuria, hematuria  Musculoskeletal:  No leg cramps or arthralgias  Neuro:  No tremor, no numbness  Psych:  No depression or confusion    Objective  Resting in hospital bed.  Family present.  Room 115.  NAD.    Vital Signs:   Vitals:    07/02/22 1500 07/02/22 1743 07/02/22 2153 07/03/22 0612   BP:  108/73 104/70 134/80   BP Location:  Right arm Left arm Right arm   Patient Position:  Lying Lying Lying   Pulse: 90 78 75 69   Resp: 18 16 16 18   Temp: 97.9 °F (36.6 °C) 97.8 °F (36.6 °C) 97.6 °F (36.4 °C) 98 °F (36.7 °C)   TempSrc: Oral Oral Oral Oral   SpO2:  95% 95% 97%   Weight:       Height:           Physical Exam:   General Appearance:    Awake and alert, in no acute distress   Head:    Normocephalic, without obvious abnormality, atraumatic   Eyes:            Conjunctivae normal, anicteric sclerae,  pupils equal   Ears:    Ears appear intact with no abnormalities noted   Throat:   No oral lesions, no thrush, oral mucosa moist   Neck:   Supple, no JVD   Lungs:     respirations regular, even and unlabored       Chest Wall:    No abnormalities observed   Abdomen:     Normal bowel sounds, soft, nontender, no rebound or guarding, nondistended, no hepatosplenomegaly   Rectal:     Deferred   Extremities:   Moves all extremities, no edema, no cyanosis   Pulses:   Pulses palpable and equal bilaterally   Skin:   No rash, no jaundice, normal palpation       Neurologic:   Cranial nerves 2 - 12 grossly intact, no asterixis       Results Review:   I reviewed the patient's labs and imaging.  Lab Results (last 24 hours)     Procedure Component Value Units Date/Time    CBC & Differential [[id number]]  (Abnormal) Collected: 07/03/22 0219    Specimen: Blood Updated: 07/03/22 0336    Narrative:      The following orders were created for panel order CBC & Differential.  Procedure                               Abnormality         Status                     ---------                               -----------         ------                     CBC Auto Differential[[id number]]        Abnormal            Final result                 Please view results for these tests on the individual orders.    CBC Auto Differential [[id number]]  (Abnormal) Collected: 07/03/22 0219    Specimen: Blood Updated: 07/03/22 0336     WBC 10.30 10*3/mm3      RBC 4.02 10*6/mm3      Hemoglobin 13.2 g/dL      Hematocrit 37.8 %      MCV 94.1 fL      MCH 32.9 pg      MCHC 35.0 g/dL      RDW 12.5 %      RDW-SD 41.1 fl      MPV 8.9 fL      Platelets 268 10*3/mm3      Neutrophil % 44.7 %      Lymphocyte % 41.8 %      Monocyte % 8.2 %      Eosinophil % 4.7 %      Basophil % 0.6 %      Neutrophils, Absolute 4.60 10*3/mm3      Lymphocytes, Absolute 4.30 10*3/mm3      Monocytes, Absolute 0.80 10*3/mm3      Eosinophils, Absolute 0.50 10*3/mm3      Basophils, Absolute 0.10  10*3/mm3      nRBC 0.1 /100 WBC     Troponin [[id number]]  (Normal) Collected: 07/02/22 1440    Specimen: Blood Updated: 07/02/22 1508     Troponin T <0.010 ng/mL     Narrative:      Troponin T Reference Range:  <= 0.03 ng/mL-   Negative for AMI  >0.03 ng/mL-     Abnormal for myocardial necrosis.  Clinicians would have to utilize clinical acumen, EKG, Troponin and serial changes to determine if it is an Acute Myocardial Infarction or myocardial injury due to an underlying chronic condition.       Results may be falsely decreased if patient taking Biotin.      Potassium [[id number]]  (Normal) Collected: 07/02/22 1440    Specimen: Blood Updated: 07/02/22 1504     Potassium 3.9 mmol/L     Magnesium [[id number]]  (Normal) Collected: 07/02/22 1440    Specimen: Blood Updated: 07/02/22 1504     Magnesium 2.4 mg/dL     Basic Metabolic Panel [[id number]]  (Abnormal) Collected: 07/02/22 0724    Specimen: Blood Updated: 07/02/22 0951     Glucose 109 mg/dL      BUN 11 mg/dL      Creatinine 0.50 mg/dL      Sodium 140 mmol/L      Potassium 4.2 mmol/L      Comment: Slight hemolysis detected by analyzer. Results may be affected.        Chloride 105 mmol/L      CO2 19.0 mmol/L      Calcium 8.5 mg/dL      BUN/Creatinine Ratio 22.0     Anion Gap 16.0 mmol/L      eGFR 115.9 mL/min/1.73      Comment: National Kidney Foundation and American Society of Nephrology (ASN) Task Force recommended calculation based on the Chronic Kidney Disease Epidemiology Collaboration (CKD-EPI) equation refit without adjustment for race.       Narrative:      GFR Normal >60  Chronic Kidney Disease <60  Kidney Failure <15      Magnesium [[id number]]  (Normal) Collected: 07/02/22 0724    Specimen: Blood Updated: 07/02/22 0945     Magnesium 2.1 mg/dL     Lipid Panel [[id number]]  (Abnormal) Collected: 07/02/22 0724    Specimen: Blood from Arm, Left Updated: 07/02/22 0909     Total Cholesterol 184 mg/dL      Triglycerides 113 mg/dL      HDL Cholesterol 43 mg/dL       LDL Cholesterol  121 mg/dL      VLDL Cholesterol 20 mg/dL      LDL/HDL Ratio 2.75    Narrative:      Cholesterol Reference Ranges  (U.S. Department of Health and Human Services ATP III Classifications)    Desirable          <200 mg/dL  Borderline High    200-239 mg/dL  High Risk          >240 mg/dL      Triglyceride Reference Ranges  (U.S. Department of Health and Human Services ATP III Classifications)    Normal           <150 mg/dL  Borderline High  150-199 mg/dL  High             200-499 mg/dL  Very High        >500 mg/dL    HDL Reference Ranges  (U.S. Department of Health and Human Services ATP III Classifications)    Low     <40 mg/dl (major risk factor for CHD)  High    >60 mg/dl ('negative' risk factor for CHD)        LDL Reference Ranges  (U.S. Department of Health and Human Services ATP III Classifications)    Optimal          <100 mg/dL  Near Optimal     100-129 mg/dL  Borderline High  130-159 mg/dL  High             160-189 mg/dL  Very High        >189 mg/dL          Imaging Results (Last 24 Hours)     Procedure Component Value Units Date/Time    CT Angiogram Chest Pulmonary Embolism [[id number]] Collected: 07/02/22 1525     Updated: 07/02/22 1532    Narrative:      CT ANGIOGRAM CHEST PULMONARY EMBOLISM-     Date of Exam: 7/2/2022 3:08 PM     Indication: Pulmonary embolism (PE) suspected, neg D-dimer; R07.9-Chest  pain, unspecified; R42-Dizziness and giddiness; R11.0-Nausea.  Chest  pain. Shortness of air.     Comparison: Chest radiograph 07/01/2022     Technique: Serial and axial CT images of the chest were obtained  following the uneventful intravenous administration of 100 cc Isovue  contrast. Reconstructions in the coronal and sagittal planes were also  performed. In addition, a 3 D volume rendered image was obtained after  post processing.  Automated exposure control and iterative  reconstruction methods were used.     FINDINGS:     Pulmonary Arteries: There is excellent opacification pulmonary  arteries.  There are no intraluminal filling defects to suggest pulmonary artery  embolus. Pulmonary arteries have normal caliber.        Hilum and Mediastinum: No pathologically enlarged lymph nodes. Mild  cardiomegaly..  No significant coronary artery atherosclerotic disease.  Unremarkable thoracic aorta.   No pericardial effusion. Small hiatal  hernia.     Lung Parenchyma and Pleura: No focal consolidations. Low lung volumes.  Mild bronchial wall thickening. Nonspecific diffuse groundglass opacity.  No suspicious pulmonary nodules.  No endobronchial lesions.  No  significant pleural effusions.     Upper Abdomen: Hepatomegaly. Status post cholecystectomy.     Soft tissues: Unremarkable.     Osseous structures: No aggressive focal lytic or sclerotic osseous  lesions.       Impression:      1.No evidence of pulmonary embolus.  2.Low lung volumes with diffuse bronchial wall thickening is nonspecific  groundglass opacity. This could be due to poor inspiration. This could  be seen with early infection or pulmonary edema.  3.Mild cardiomegaly.  4.Small hiatal hernia.  5.Hepatomegaly.              Electronically Signed By-Jessica Giang MD On:7/2/2022 3:30 PM  This report was finalized on [id number] by  Jessica Giang MD.             ASSESSMENT AND PLAN:  Right-sided chest pain radiating to the neck consider esophagitis, esophageal spasm, gastritis, ulcer.  Negative cardiac work-up  GERD  Daily ibuprofen usage  Personal history of colon polyps at the age of 11  Hypertension  Anxiety  History of cholecystectomy due to biliary dyskinesia      PLAN:  Plan EGD today to evaluate for cause of pain  Continue PPI twice a day  Avoid NSAID usage  Would recommend GI cocktail if pain returns  Patient needs to follow-up for colonoscopy as soon as possible with her history of colon polyps at/a young age.  Supportive care in the interim.    I discussed the patient's findings and my recommendations with the patient.  Juliette Hallman,  RADHA  07/03/22  08:26 EDT    Time:

## 2022-07-03 NOTE — PLAN OF CARE
Problem: Adult Inpatient Plan of Care  Goal: Plan of Care Review  Outcome: Ongoing, Not Progressing  Flowsheets (Taken 7/3/2022 0551)  Progress: no change  Plan of Care Reviewed With: patient  Outcome Evaluation: will have a GI consult, VSS, SR on the monitor, VSS, will monitor, pt stable  Goal: Patient-Specific Goal (Individualized)  Outcome: Ongoing, Not Progressing  Goal: Absence of Hospital-Acquired Illness or Injury  Outcome: Ongoing, Not Progressing  Intervention: Identify and Manage Fall Risk  Recent Flowsheet Documentation  Taken 7/3/2022 0400 by Lacy Benitez, RN  Safety Promotion/Fall Prevention: safety round/check completed  Taken 7/3/2022 0200 by Lacy Benitez, RN  Safety Promotion/Fall Prevention: safety round/check completed  Taken 7/3/2022 0000 by Lacy Benitez, RN  Safety Promotion/Fall Prevention: safety round/check completed  Goal: Optimal Comfort and Wellbeing  Outcome: Ongoing, Not Progressing  Goal: Readiness for Transition of Care  Outcome: Ongoing, Not Progressing     Problem: Chest Pain  Goal: Resolution of Chest Pain Symptoms  Outcome: Ongoing, Not Progressing   Goal Outcome Evaluation:  Plan of Care Reviewed With: patient        Progress: no change  Outcome Evaluation: will have a GI consult, VSS, SR on the monitor, VSS, will monitor, pt stable

## 2022-07-03 NOTE — OUTREACH NOTE
Prep Survey    Flowsheet Row Responses   [facility] facility patient discharged from? [facility]   Is LACE score < 7 ? Yes   Emergency Room discharge w/ pulse ox? No   Eligibility [facility]   Date of Admission 07/01/22   Date of Discharge 07/03/22   Discharge Disposition Home or Self Care   Discharge diagnosis Chest pain   Does the patient have one of the following disease processes/diagnoses(primary or secondary)? Other   Does the patient have Home health ordered? No   Is there a DME ordered? No   Prep survey completed? Yes          GAURI CORRALES - Registered Nurse

## 2022-07-03 NOTE — ANESTHESIA PREPROCEDURE EVALUATION
Anesthesia Evaluation     Patient summary reviewed and Nursing notes reviewed   NPO Solid Status: > 8 hours  NPO Liquid Status: > 8 hours           Airway   Mallampati: II  TM distance: >3 FB  Neck ROM: full  No difficulty expected  Dental - normal exam     Pulmonary    Cardiovascular     (+) hypertension, hyperlipidemia,       Neuro/Psych  GI/Hepatic/Renal/Endo    (+) obesity,  GERD,      Musculoskeletal     Abdominal    Substance History      OB/GYN          Other                        Anesthesia Plan    ASA 3     MAC     intravenous induction     Anesthetic plan, risks, benefits, and alternatives have been provided, discussed and informed consent has been obtained with: patient.        CODE STATUS:    Code Status (Patient has no pulse and is not breathing): CPR (Attempt to Resuscitate)  Medical Interventions (Patient has pulse or is breathing): Full Support

## 2022-07-03 NOTE — OP NOTE
ESOPHAGOGASTRODUODENOSCOPY Procedure Report    Patient Name:  Evelyn Garcia  YOB: 1975    Date of Surgery:  7/3/2022     Pre-Op Diagnosis:  Atypical chest pain       Post Op Diagnosis:  Normal EGD    Procedure/CPT® Codes:      Procedure(s):  ESOPHAGOGASTRODUODENOSCOPY    Staff:  Surgeon(s):  Ashly Coronado MD         Anesthesia: Monitored Anesthesia Care    Implants:    Nothing was implanted during the procedure    Specimen:        See Below    No blood loss    Complications:  None     Description of Procedure:  Informed consent was obtained for the procedure, including sedation.  Risks of perforation, hemorrhage, adverse drug reaction and aspiration were discussed.  The patient was brought into the endoscopy suite. Continuous cardiopulmonary monitoring was performed. The patient was placed in the left lateral decubitus position.  The bite block was inserted into the patient's mouth. After adequate sedation was attained, the Olympus gastroscope was inserted into the patient's mouth and advanced to the second portion of the duodenum without difficulty.  Circumferential examination was performed. A retroflex exam was performed in the patient's stomach.  On completion of the exam, the bowel was decompressed, the scope was removed from the patient, the patient tolerated the procedure well, there were no immediate post-operative complications.     Examination of the esophagus showed normal mucosa  Examination of the stomach showed normal mucosa  Retroflex examination of the stomach was normal   Examination of the duodenum showed no mucosa      Impression:  Normal EGD  Nothing found on today's exam explain the patient's atypical chest pain    Recommendations:  Outpatient esophageal manometry  Okay for diet & discharge from GI standpoint      Ashly Coronado MD     Date: 7/3/2022  Time: 12:14 EDT

## 2022-07-04 NOTE — PLAN OF CARE
Problem: Adult Inpatient Plan of Care  Goal: Plan of Care Review  Outcome: Met  Goal: Patient-Specific Goal (Individualized)  Outcome: Met  Goal: Absence of Hospital-Acquired Illness or Injury  Outcome: Met  Intervention: Identify and Manage Fall Risk  Description: Perform standard risk assessment on admission using a validated tool or comprehensive approach appropriate to the patient; reassess fall risk frequently, with change in status or transfer to another level of care.  Communicate fall injury risk to interprofessional healthcare team.  Determine need for increased observation, equipment and environmental modification, such as low bed, signage and supportive, nonskid footwear.  Adjust safety measures to individual developmental age, stage and identified risk factors.  Reinforce the importance of safety and physical activity with patient and family.  Perform regular intentional rounding to assess need for position change, pain assessment and personal needs, including assistance with toileting.  Recent Flowsheet Documentation  Taken 7/3/2022 1800 by Shraddha Montoya RN  Safety Promotion/Fall Prevention:   assistive device/personal items within reach   clutter free environment maintained   safety round/check completed   room organization consistent  Taken 7/3/2022 1600 by Shraddha Montoya RN  Safety Promotion/Fall Prevention:   assistive device/personal items within reach   clutter free environment maintained   safety round/check completed   room organization consistent  Taken 7/3/2022 1400 by Shraddha Montoya RN  Safety Promotion/Fall Prevention:   clutter free environment maintained   assistive device/personal items within reach   safety round/check completed   room organization consistent  Taken 7/3/2022 1320 by Shraddha Montoya, RN  Safety Promotion/Fall Prevention:   assistive device/personal items within reach   clutter free environment maintained   safety round/check completed   room organization  consistent  Taken 7/3/2022 1200 by Shraddha Montoya RN  Safety Promotion/Fall Prevention:   assistive device/personal items within reach   clutter free environment maintained   safety round/check completed   room organization consistent  Taken 7/3/2022 1000 by Shraddha Montoya RN  Safety Promotion/Fall Prevention:   assistive device/personal items within reach   clutter free environment maintained   safety round/check completed   room organization consistent  Taken 7/3/2022 0810 by Shraddha Montoya RN  Safety Promotion/Fall Prevention:   assistive device/personal items within reach   clutter free environment maintained   safety round/check completed   room organization consistent  Intervention: Prevent Skin Injury  Description: Perform a screening for skin injury risk, such as pressure or moisture associated skin damage on admission and at regular intervals throughout hospital stay.  Keep all areas of skin (especially folds) clean and dry.  Maintain adequate skin hydration.  Relieve and redistribute pressure and protect bony prominences; implement measures based on patient-specific risk factors.  Match turning and repositioning schedule to clinical condition.  Encourage weight shift frequently; assist with reposition if unable to complete independently.  Float heels off bed; avoid pressure on the Achilles tendon.  Keep skin free from extended contact with medical devices.  Encourage functional activity and mobility, as early as tolerated.  Use aids (e.g., slide boards, mechanical lift) during transfer.  Recent Flowsheet Documentation  Taken 7/3/2022 1800 by Shraddha Montoya RN  Body Position:   position changed independently   sitting up in bed  Taken 7/3/2022 1600 by Shraddha Montoya RN  Body Position:   position changed independently   sitting up in bed  Taken 7/3/2022 1400 by Shraddha Montoya RN  Body Position:   supine   position changed independently  Taken 7/3/2022 1320 by Shraddha Montoya RN  Body Position:   sitting up in  bed   position changed independently  Taken 7/3/2022 1200 by Shraddha Montoya RN  Body Position:   sitting up in bed   position changed independently  Taken 7/3/2022 1000 by Shraddha Montoya RN  Body Position:   position changed independently   sitting up in bed  Taken 7/3/2022 0810 by Shraddha Montoya RN  Body Position:   position changed independently   sitting up in bed  Intervention: Prevent and Manage VTE (Venous Thromboembolism) Risk  Description: Assess for VTE (venous thromboembolism) risk.  Encourage and assist with early ambulation.  Initiate and maintain compression or other therapy, as indicated, based on identified risk in accordance with organizational protocol and provider order.  Encourage both active and passive leg exercises while in bed, if unable to ambulate.  Recent Flowsheet Documentation  Taken 7/3/2022 1800 by Shraddha Montoya RN  Activity Management:   activity adjusted per tolerance   activity encouraged   up ad andi  Taken 7/3/2022 1600 by Shraddha Montoya RN  Activity Management:   activity adjusted per tolerance   activity encouraged   up ad andi  Taken 7/3/2022 1400 by Shraddha Montoya RN  Activity Management:   activity adjusted per tolerance   activity encouraged   up ad andi  Taken 7/3/2022 1320 by Shraddha Montoya RN  Activity Management:   activity encouraged   activity adjusted per tolerance   up ad andi  Taken 7/3/2022 1200 by Shraddha Montoya RN  Activity Management:   activity adjusted per tolerance   activity encouraged   up ad andi  Taken 7/3/2022 1000 by Shraddha Montoya RN  Activity Management:   activity encouraged   activity adjusted per tolerance   up ad andi  Taken 7/3/2022 0810 by Shraddha Montoya RN  Activity Management:   activity encouraged   up ad andi  Intervention: Prevent Infection  Description: Maintain skin and mucous membrane integrity; promote hand, oral and pulmonary hygiene.  Optimize fluid balance, nutrition, sleep and glycemic control to maximize infection resistance.  Identify  potential sources of infection early to prevent or mitigate progression of infection (e.g., wound, lines, devices).  Evaluate ongoing need for invasive devices; remove promptly when no longer indicated.  Recent Flowsheet Documentation  Taken 7/3/2022 1800 by Shraddha Montoya RN  Infection Prevention:   single patient room provided   rest/sleep promoted  Taken 7/3/2022 1600 by Shraddha Montoya RN  Infection Prevention:   single patient room provided   rest/sleep promoted  Taken 7/3/2022 1400 by Shraddha Montoya RN  Infection Prevention:   single patient room provided   rest/sleep promoted  Taken 7/3/2022 1320 by Shraddha Montoya RN  Infection Prevention:   single patient room provided   rest/sleep promoted  Taken 7/3/2022 1200 by Shraddha Montoya RN  Infection Prevention:   single patient room provided   rest/sleep promoted  Taken 7/3/2022 1000 by Shraddha Montoya RN  Infection Prevention:   single patient room provided   rest/sleep promoted  Taken 7/3/2022 0810 by Shraddha Montoya RN  Infection Prevention:   single patient room provided   rest/sleep promoted  Goal: Optimal Comfort and Wellbeing  Outcome: Met  Intervention: Monitor Pain and Promote Comfort  Description: Assess pain level, treatment efficacy and patient response at regular intervals using a consistent pain scale.  Consider the presence and impact of preexisting chronic pain.  Encourage patient and caregiver involvement in pain assessment, interventions and safety measures.  Recent Flowsheet Documentation  Taken 7/3/2022 1600 by Shraddha Montoya RN  Pain Management Interventions: quiet environment facilitated  Taken 7/3/2022 1320 by Shraddha Montoya RN  Pain Management Interventions:   quiet environment facilitated   see MAR  Taken 7/3/2022 1200 by Shraddha Montoya RN  Pain Management Interventions: quiet environment facilitated  Taken 7/3/2022 0810 by Shraddha Montoya RN  Pain Management Interventions: see MAR  Intervention: Provide Person-Centered Care  Description: Use a  family-focused approach to care.  Develop trust and rapport by proactively providing information, encouraging questions, addressing concerns and offering reassurance.  Acknowledge emotional response to hospitalization.  Recognize and utilize personal coping strategies.  Honor spiritual and cultural preferences.  Recent Flowsheet Documentation  Taken 7/3/2022 1320 by Shraddha Montoya, CARMEN  Trust Relationship/Rapport:   care explained   choices provided   emotional support provided   empathic listening provided   questions answered   questions encouraged   reassurance provided   thoughts/feelings acknowledged  Taken 7/3/2022 0810 by Shraddha Montoya RN  Trust Relationship/Rapport:   thoughts/feelings acknowledged   care explained   choices provided   emotional support provided   questions encouraged   questions answered   reassurance provided   empathic listening provided  Goal: Readiness for Transition of Care  Outcome: Met   Goal Outcome Evaluation:            Patient discharging home.

## 2022-07-05 ENCOUNTER — TRANSITIONAL CARE MANAGEMENT TELEPHONE ENCOUNTER (OUTPATIENT)
Dept: CALL CENTER | Facility: HOSPITAL | Age: 47
End: 2022-07-05

## 2022-07-05 NOTE — OUTREACH NOTE
Call Center TCM Note    Flowsheet Row Responses   [facility] patient discharged from? [facility]   Does the patient have one of the following disease processes/diagnoses(primary or secondary)? Other   TCM attempt successful? Yes   Discharge diagnosis Chest pain   Meds reviewed with patient/caregiver? Yes   Is the patient having any side effects they believe may be caused by any medication additions or changes? No   Does the patient have all medications ordered at discharge? No   Prescription comments Pt rx'd new medications at d/c: Norco and Nitrostat. Pt has not p/u from her regular pharm as yet, using Tylenol prn.    Does the patient have a primary care provider?  Yes   Does the patient have an appointment with their PCP within 7 days of discharge? No   Comments regarding PCP Pt declined my assist in sched TCM APPT with PCP Dr Cooper. Pt states she will sched on her own in a day or so.    Has the patient kept scheduled appointments due by today? N/A   Has home health visited the patient within 72 hours of discharge? N/A   Psychosocial issues? No   Did the patient receive a copy of their discharge instructions? Yes   Nursing interventions Reviewed instructions with patient   What is the patient's perception of their health status since discharge? Improving   Is the patient/caregiver able to teach back signs and symptoms related to disease process for when to call PCP? Yes   Is the patient/caregiver able to teach back signs and symptoms related to disease process for when to call 911? Yes   Is the patient/caregiver able to teach back the hierarchy of who to call/visit for symptoms/problems? PCP, Specialist, Home health nurse, Urgent Care, ED, 911 Yes   If the patient is a current smoker, are they able to teach back resources for cessation? Not a smoker   TCM call completed? Yes   Wrap up additional comments Pt doing fair s/p D/C DX: Chest pain w/dizziness, nausea. Chest pain better, not gone & other symptoms the  same. Tired but no new symptoms. No questions at this time. Pt rx'd new medications at d/c: Norco and Nitrostat. Pt has not p/u from her regular pharm as yet, using Tylenol prn. Pt declined my assist in sched TCM APPT with PCP Dr Cooper. Pt states she will sched on her own in a day or so.           Violeta Quintanilla MA    7/5/2022, 14:42 EDT

## 2022-07-12 ENCOUNTER — OFFICE VISIT (OUTPATIENT)
Dept: FAMILY MEDICINE CLINIC | Facility: CLINIC | Age: 47
End: 2022-07-12

## 2022-07-12 VITALS
OXYGEN SATURATION: 97 % | BODY MASS INDEX: 31.45 KG/M2 | SYSTOLIC BLOOD PRESSURE: 144 MMHG | DIASTOLIC BLOOD PRESSURE: 94 MMHG | HEART RATE: 98 BPM | HEIGHT: 60 IN | RESPIRATION RATE: 16 BRPM | TEMPERATURE: 97.5 F | WEIGHT: 160.2 LBS

## 2022-07-12 DIAGNOSIS — J30.1 ALLERGIC RHINITIS DUE TO POLLEN, UNSPECIFIED SEASONALITY: ICD-10-CM

## 2022-07-12 DIAGNOSIS — J40 BRONCHITIS: Primary | ICD-10-CM

## 2022-07-12 DIAGNOSIS — F32.A DEPRESSION, UNSPECIFIED DEPRESSION TYPE: ICD-10-CM

## 2022-07-12 PROCEDURE — 99495 TRANSJ CARE MGMT MOD F2F 14D: CPT | Performed by: FAMILY MEDICINE

## 2022-07-12 RX ORDER — BUPROPION HYDROCHLORIDE 150 MG/1
150 TABLET ORAL DAILY
Qty: 90 TABLET | Refills: 1 | Status: SHIPPED | OUTPATIENT
Start: 2022-07-12

## 2022-07-12 RX ORDER — DOXYCYCLINE 100 MG/1
100 CAPSULE ORAL 2 TIMES DAILY
Qty: 20 CAPSULE | Refills: 0 | Status: SHIPPED | OUTPATIENT
Start: 2022-07-12

## 2022-07-12 NOTE — PROGRESS NOTES
Transitional Care Follow Up Visit  Subjective     Evelyn Garcia is a 47 y.o. female who presents for a transitional care management visit.    Within 48 business hours after discharge our office contacted her via telephone to coordinate her care and needs.      I reviewed and discussed the details of that call along with the discharge summary, hospital problems, inpatient lab results, inpatient diagnostic studies, and consultation reports with Evelyn.     Current outpatient and discharge medications have been reconciled for the patient.  Reviewed by: Kathia Cooper MD      Date of TCM Phone Call 7/3/2022   [facility]   Date of Admission 7/1/2022   Date of Discharge 7/3/2022   Discharge Disposition Home or Self Care     Risk for Readmission (LACE) Score: 2 (7/3/2022  6:00 AM)      Depression  Visit Type: initial  Onset of symptoms: 1 to 6 months ago  Progression since onset: gradually worsening  Patient presents with the following symptoms: anhedonia, decreased concentration, depressed mood, fatigue, irritability and weight gain.  Patient is not experiencing: suicidal ideas, suicidal planning and thoughts of death.  Severity: causing significant distress   Sleep quality: fair  Treatment tried: SSRI  Compliance with treatment: good  Improvement on treatment: mild  Past compliance problems: medication issues (side effects from medicine, weight gain)      Cough  This is a new problem. The current episode started 1 to 4 weeks ago. The problem has been unchanged. The problem occurs every few minutes. The cough is productive of sputum. The symptoms are aggravated by pollens.      Course During Hospital Stay:  Patient is a 47 y.o. female presented. Serial troponins remain less than 0.010 and proBNP was within normal limits at 22.2.  D-dimer was initially found to be 0.46 in the ED with subsequent chest x-ray showing no acute cardiopulmonary process.  EKG also showed no acute changes.  She was continued on telemetry  throughout her admission without significant events noted overnight and patient did continue to report some right-sided chest pain radiating towards her back just below the superior portion of her scapula.  Stress testing was performed on 7/2/2022 and was reported as low risk.  Patient had a severe episode of chest/back and epigastric pain following stress testing.  She was given IV labetalol as well as IV morphine.  Subsequent CT PE protocol showing no PE or aortic pathology.  Given the observed severity of this episode GI was consulted who evaluated patient and performed EGD which was noted as normal on 7/3/2022 with plans for outpatient esophageal manometry and recommendation for nitroglycerin use if symptoms recur.       The following portions of the patient's history were reviewed and updated as appropriate: allergies, current medications, past family history, past medical history, past social history, past surgical history and problem list.    Review of Systems   Constitutional: Positive for irritability and weight gain.   Respiratory: Positive for cough.    Psychiatric/Behavioral: Positive for decreased concentration. Negative for suicidal ideas.       Objective   Physical Exam  Constitutional:       General: She is not in acute distress.     Appearance: She is well-developed.   HENT:      Head: Normocephalic.   Eyes:      General: Lids are normal.   Neck:      Thyroid: No thyroid mass or thyromegaly.      Trachea: Trachea normal.   Cardiovascular:      Rate and Rhythm: Normal rate and regular rhythm.      Heart sounds: Normal heart sounds.   Pulmonary:      Effort: Pulmonary effort is normal.      Breath sounds: Rhonchi present.   Abdominal:      Palpations: Abdomen is soft.   Musculoskeletal:      Cervical back: Normal range of motion.   Lymphadenopathy:      Cervical: No cervical adenopathy.   Skin:     General: Skin is warm and dry.   Neurological:      Mental Status: She is alert and oriented to person,  place, and time.   Psychiatric:         Attention and Perception: She is attentive.         Mood and Affect: Mood normal.         Speech: Speech normal.         Behavior: Behavior normal.         Assessment & Plan   Diagnoses and all orders for this visit:    1. Bronchitis (Primary)  -     doxycycline (MONODOX) 100 MG capsule; Take 1 capsule by mouth 2 (Two) Times a Day.  Dispense: 20 capsule; Refill: 0    2. Allergic rhinitis due to pollen, unspecified seasonality  Assessment & Plan:  Zyrtec ok to take qhs prn      3. Depression, unspecified depression type    Other orders  -     buPROPion XL (Wellbutrin XL) 150 MG 24 hr tablet; Take 1 tablet by mouth Daily.  Dispense: 90 tablet; Refill: 1

## 2022-07-15 LAB — QT INTERVAL: 394 MS

## 2022-07-26 DIAGNOSIS — I10 PRIMARY HYPERTENSION: Chronic | ICD-10-CM

## 2022-07-27 RX ORDER — LISINOPRIL 10 MG/1
TABLET ORAL
Qty: 90 TABLET | Refills: 1 | Status: SHIPPED | OUTPATIENT
Start: 2022-07-27 | End: 2023-01-21

## 2022-09-26 ENCOUNTER — PATIENT MESSAGE (OUTPATIENT)
Dept: FAMILY MEDICINE CLINIC | Facility: CLINIC | Age: 47
End: 2022-09-26

## 2022-09-27 RX ORDER — SERTRALINE HYDROCHLORIDE 100 MG/1
100 TABLET, FILM COATED ORAL DAILY
Qty: 90 TABLET | Refills: 1 | Status: SHIPPED | OUTPATIENT
Start: 2022-09-27 | End: 2023-04-03

## 2023-01-21 DIAGNOSIS — I10 PRIMARY HYPERTENSION: Chronic | ICD-10-CM

## 2023-01-21 RX ORDER — LISINOPRIL 10 MG/1
TABLET ORAL
Qty: 90 TABLET | Refills: 1 | Status: SHIPPED | OUTPATIENT
Start: 2023-01-21

## 2023-04-03 RX ORDER — SERTRALINE HYDROCHLORIDE 100 MG/1
TABLET, FILM COATED ORAL
Qty: 90 TABLET | Refills: 0 | Status: SHIPPED | OUTPATIENT
Start: 2023-04-03

## 2023-06-05 ENCOUNTER — PATIENT MESSAGE (OUTPATIENT)
Dept: FAMILY MEDICINE CLINIC | Facility: CLINIC | Age: 48
End: 2023-06-05
Payer: COMMERCIAL

## 2023-06-05 DIAGNOSIS — I10 PRIMARY HYPERTENSION: ICD-10-CM

## 2023-06-05 DIAGNOSIS — R73.9 HYPERGLYCEMIA: Primary | ICD-10-CM

## 2023-07-12 LAB
ALBUMIN SERPL-MCNC: 4.6 G/DL (ref 3.5–5.2)
ALBUMIN/GLOB SERPL: 1.7 G/DL
ALP SERPL-CCNC: 30 U/L (ref 39–117)
ALT SERPL W P-5'-P-CCNC: 19 U/L (ref 1–33)
ANION GAP SERPL CALCULATED.3IONS-SCNC: 10 MMOL/L (ref 5–15)
AST SERPL-CCNC: 16 U/L (ref 1–32)
BASOPHILS # BLD AUTO: 0.08 10*3/MM3 (ref 0–0.2)
BASOPHILS NFR BLD AUTO: 0.8 % (ref 0–1.5)
BILIRUB SERPL-MCNC: 0.3 MG/DL (ref 0–1.2)
BUN SERPL-MCNC: 14 MG/DL (ref 6–20)
BUN/CREAT SERPL: 22.6 (ref 7–25)
CALCIUM SPEC-SCNC: 10.1 MG/DL (ref 8.6–10.5)
CHLORIDE SERPL-SCNC: 104 MMOL/L (ref 98–107)
CHOLEST SERPL-MCNC: 212 MG/DL (ref 0–200)
CO2 SERPL-SCNC: 25 MMOL/L (ref 22–29)
CREAT SERPL-MCNC: 0.62 MG/DL (ref 0.57–1)
DEPRECATED RDW RBC AUTO: 41.4 FL (ref 37–54)
EGFRCR SERPLBLD CKD-EPI 2021: 109.3 ML/MIN/1.73
EOSINOPHIL # BLD AUTO: 0.39 10*3/MM3 (ref 0–0.4)
EOSINOPHIL NFR BLD AUTO: 3.8 % (ref 0.3–6.2)
ERYTHROCYTE [DISTWIDTH] IN BLOOD BY AUTOMATED COUNT: 12.6 % (ref 12.3–15.4)
GLOBULIN UR ELPH-MCNC: 2.7 GM/DL
GLUCOSE SERPL-MCNC: 77 MG/DL (ref 65–99)
HBA1C MFR BLD: 5.7 % (ref 4.8–5.6)
HCT VFR BLD AUTO: 37.8 % (ref 34–46.6)
HDLC SERPL-MCNC: 49 MG/DL (ref 40–60)
HGB BLD-MCNC: 13.2 G/DL (ref 12–15.9)
IMM GRANULOCYTES # BLD AUTO: 0.05 10*3/MM3 (ref 0–0.05)
IMM GRANULOCYTES NFR BLD AUTO: 0.5 % (ref 0–0.5)
LDLC SERPL CALC-MCNC: 122 MG/DL (ref 0–100)
LDLC/HDLC SERPL: 2.36 {RATIO}
LYMPHOCYTES # BLD AUTO: 3.78 10*3/MM3 (ref 0.7–3.1)
LYMPHOCYTES NFR BLD AUTO: 36.4 % (ref 19.6–45.3)
MCH RBC QN AUTO: 32.1 PG (ref 26.6–33)
MCHC RBC AUTO-ENTMCNC: 34.9 G/DL (ref 31.5–35.7)
MCV RBC AUTO: 92 FL (ref 79–97)
MONOCYTES # BLD AUTO: 0.64 10*3/MM3 (ref 0.1–0.9)
MONOCYTES NFR BLD AUTO: 6.2 % (ref 5–12)
NEUTROPHILS NFR BLD AUTO: 5.44 10*3/MM3 (ref 1.7–7)
NEUTROPHILS NFR BLD AUTO: 52.3 % (ref 42.7–76)
NRBC BLD AUTO-RTO: 0 /100 WBC (ref 0–0.2)
PLATELET # BLD AUTO: 265 10*3/MM3 (ref 140–450)
PMV BLD AUTO: 10.7 FL (ref 6–12)
POTASSIUM SERPL-SCNC: 4.1 MMOL/L (ref 3.5–5.2)
PROT SERPL-MCNC: 7.3 G/DL (ref 6–8.5)
RBC # BLD AUTO: 4.11 10*6/MM3 (ref 3.77–5.28)
SODIUM SERPL-SCNC: 139 MMOL/L (ref 136–145)
TRIGL SERPL-MCNC: 236 MG/DL (ref 0–150)
TSH SERPL DL<=0.05 MIU/L-ACNC: 1.58 UIU/ML (ref 0.27–4.2)
VLDLC SERPL-MCNC: 41 MG/DL (ref 5–40)
WBC NRBC COR # BLD: 10.38 10*3/MM3 (ref 3.4–10.8)

## 2023-08-07 RX ORDER — ATORVASTATIN CALCIUM 40 MG/1
TABLET, FILM COATED ORAL
Qty: 90 TABLET | Refills: 0 | Status: SHIPPED | OUTPATIENT
Start: 2023-08-07

## 2023-08-07 NOTE — TELEPHONE ENCOUNTER
I refilled her medicine but it has been over a year since her last appointment with me.  She needs to schedule an appointment to see me and have labs done.

## 2023-09-19 ENCOUNTER — OFFICE VISIT (OUTPATIENT)
Dept: FAMILY MEDICINE CLINIC | Facility: CLINIC | Age: 48
End: 2023-09-19
Payer: COMMERCIAL

## 2023-09-19 VITALS
TEMPERATURE: 98.4 F | WEIGHT: 162.8 LBS | HEIGHT: 61 IN | OXYGEN SATURATION: 96 % | SYSTOLIC BLOOD PRESSURE: 117 MMHG | BODY MASS INDEX: 30.73 KG/M2 | DIASTOLIC BLOOD PRESSURE: 77 MMHG | HEART RATE: 77 BPM

## 2023-09-19 DIAGNOSIS — Z00.00 WELLNESS EXAMINATION: Primary | ICD-10-CM

## 2023-09-19 DIAGNOSIS — Z12.31 ENCOUNTER FOR SCREENING MAMMOGRAM FOR BREAST CANCER: ICD-10-CM

## 2023-09-19 DIAGNOSIS — Z12.11 SCREEN FOR COLON CANCER: ICD-10-CM

## 2023-09-19 PROCEDURE — 99396 PREV VISIT EST AGE 40-64: CPT | Performed by: FAMILY MEDICINE

## 2023-09-19 NOTE — PROGRESS NOTES
"Subjective   Evelyn Garcia is a 48 y.o. female and is here for a comprehensive physical exam. The patient reports no problems.    Do you take any herbs or supplements that were not prescribed by a doctor? no  Are you taking calcium supplements? no  Are you taking aspirin daily? no    The following portions of the patient's history were reviewed and updated as appropriate: allergies, current medications, past family history, past medical history, past social history, past surgical history, and problem list.    Review of Systems  Do you have pain that bothers you in your daily life? no  A comprehensive review of systems was negative.  BMI is >= 30 and <35. (Class 1 Obesity). The following options were offered after discussion;: exercise counseling/recommendations   Objective   /77 (BP Location: Right arm, Patient Position: Sitting, Cuff Size: Large Adult)   Pulse 77   Temp 98.4 °F (36.9 °C) (Infrared)   Ht 154.9 cm (61\")   Wt 73.8 kg (162 lb 12.8 oz)   LMP  (LMP Unknown)   SpO2 96%   BMI 30.76 kg/m²   General appearance: alert, appears stated age, and cooperative  Head: Normocephalic, without obvious abnormality, atraumatic  Eyes: conjunctivae/corneas clear. PERRL, EOM's intact. Fundi benign.  Ears: normal TM's and external ear canals both ears  Throat: lips, mucosa, and tongue normal; teeth and gums normal  Neck: no adenopathy, no JVD, supple, symmetrical, trachea midline, and thyroid not enlarged, symmetric, no tenderness/mass/nodules  Lungs: clear to auscultation bilaterally  Heart: regular rate and rhythm, S1, S2 normal, no murmur, click, rub or gallop  Abdomen: soft, non-tender; bowel sounds normal; no masses,  no organomegaly  Extremities: extremities normal, atraumatic, no cyanosis or edema  Pulses: 2+ and symmetric  Skin: Skin color, texture, turgor normal. No rashes or lesions  Lymph nodes: Cervical, supraclavicular, and axillary nodes normal.  Neurologic: Grossly normal     No visits with results " within 1 Week(s) from this visit.   Latest known visit with results is:   Patient Message on 06/05/2023   Component Date Value Ref Range Status    Glucose 07/12/2023 77  65 - 99 mg/dL Final    BUN 07/12/2023 14  6 - 20 mg/dL Final    Creatinine 07/12/2023 0.62  0.57 - 1.00 mg/dL Final    Sodium 07/12/2023 139  136 - 145 mmol/L Final    Potassium 07/12/2023 4.1  3.5 - 5.2 mmol/L Final    Chloride 07/12/2023 104  98 - 107 mmol/L Final    CO2 07/12/2023 25.0  22.0 - 29.0 mmol/L Final    Calcium 07/12/2023 10.1  8.6 - 10.5 mg/dL Final    Total Protein 07/12/2023 7.3  6.0 - 8.5 g/dL Final    Albumin 07/12/2023 4.6  3.5 - 5.2 g/dL Final    ALT (SGPT) 07/12/2023 19  1 - 33 U/L Final    AST (SGOT) 07/12/2023 16  1 - 32 U/L Final    Alkaline Phosphatase 07/12/2023 30 (L)  39 - 117 U/L Final    Total Bilirubin 07/12/2023 0.3  0.0 - 1.2 mg/dL Final    Globulin 07/12/2023 2.7  gm/dL Final    A/G Ratio 07/12/2023 1.7  g/dL Final    BUN/Creatinine Ratio 07/12/2023 22.6  7.0 - 25.0 Final    Anion Gap 07/12/2023 10.0  5.0 - 15.0 mmol/L Final    eGFR 07/12/2023 109.3  >60.0 mL/min/1.73 Final    Hemoglobin A1C 07/12/2023 5.70 (H)  4.80 - 5.60 % Final    Total Cholesterol 07/12/2023 212 (H)  0 - 200 mg/dL Final    Triglycerides 07/12/2023 236 (H)  0 - 150 mg/dL Final    HDL Cholesterol 07/12/2023 49  40 - 60 mg/dL Final    LDL Cholesterol  07/12/2023 122 (H)  0 - 100 mg/dL Final    VLDL Cholesterol 07/12/2023 41 (H)  5 - 40 mg/dL Final    LDL/HDL Ratio 07/12/2023 2.36   Final    TSH 07/12/2023 1.580  0.270 - 4.200 uIU/mL Final    WBC 07/12/2023 10.38  3.40 - 10.80 10*3/mm3 Final    RBC 07/12/2023 4.11  3.77 - 5.28 10*6/mm3 Final    Hemoglobin 07/12/2023 13.2  12.0 - 15.9 g/dL Final    Hematocrit 07/12/2023 37.8  34.0 - 46.6 % Final    MCV 07/12/2023 92.0  79.0 - 97.0 fL Final    MCH 07/12/2023 32.1  26.6 - 33.0 pg Final    MCHC 07/12/2023 34.9  31.5 - 35.7 g/dL Final    RDW 07/12/2023 12.6  12.3 - 15.4 % Final    RDW-SD 07/12/2023  41.4  37.0 - 54.0 fl Final    MPV 07/12/2023 10.7  6.0 - 12.0 fL Final    Platelets 07/12/2023 265  140 - 450 10*3/mm3 Final    Neutrophil % 07/12/2023 52.3  42.7 - 76.0 % Final    Lymphocyte % 07/12/2023 36.4  19.6 - 45.3 % Final    Monocyte % 07/12/2023 6.2  5.0 - 12.0 % Final    Eosinophil % 07/12/2023 3.8  0.3 - 6.2 % Final    Basophil % 07/12/2023 0.8  0.0 - 1.5 % Final    Immature Grans % 07/12/2023 0.5  0.0 - 0.5 % Final    Neutrophils, Absolute 07/12/2023 5.44  1.70 - 7.00 10*3/mm3 Final    Lymphocytes, Absolute 07/12/2023 3.78 (H)  0.70 - 3.10 10*3/mm3 Final    Monocytes, Absolute 07/12/2023 0.64  0.10 - 0.90 10*3/mm3 Final    Eosinophils, Absolute 07/12/2023 0.39  0.00 - 0.40 10*3/mm3 Final    Basophils, Absolute 07/12/2023 0.08  0.00 - 0.20 10*3/mm3 Final    Immature Grans, Absolute 07/12/2023 0.05  0.00 - 0.05 10*3/mm3 Final    nRBC 07/12/2023 0.0  0.0 - 0.2 /100 WBC Final       Assessment & Plan   Healthy female exam.   Diagnoses and all orders for this visit:    1. Wellness examination (Primary)    2. Screen for colon cancer  -     Ambulatory Referral For Screening Colonoscopy    3. Encounter for screening mammogram for breast cancer  -     Mammo Screening Digital Tomosynthesis Bilateral With CAD      1. Well exam.  2. Patient Counseling:  --Nutrition: Stressed importance of moderation in sodium/caffeine intake, saturated fat and cholesterol, caloric balance, sufficient intake of fresh fruits, vegetables, fiber, calcium, iron  --Exercise: Stressed the importance of regular exercise.   --Dental health: Discussed importance of regular tooth brushing, flossing, and dental visits.  --Immunizations reviewed.  --Discussed benefits of screening colonoscopy.    3. Discussed the patient's BMI with her.  The BMI is above average; BMI management plan is completed  4. Follow up in one year

## 2023-10-23 RX ORDER — SERTRALINE HYDROCHLORIDE 100 MG/1
TABLET, FILM COATED ORAL
Qty: 90 TABLET | Refills: 1 | Status: SHIPPED | OUTPATIENT
Start: 2023-10-23

## 2023-11-03 RX ORDER — ATORVASTATIN CALCIUM 40 MG/1
TABLET, FILM COATED ORAL
Qty: 90 TABLET | Refills: 3 | Status: SHIPPED | OUTPATIENT
Start: 2023-11-03

## 2023-11-11 DIAGNOSIS — I10 PRIMARY HYPERTENSION: Chronic | ICD-10-CM

## 2023-11-13 RX ORDER — LISINOPRIL 10 MG/1
TABLET ORAL
Qty: 90 TABLET | Refills: 1 | Status: SHIPPED | OUTPATIENT
Start: 2023-11-13

## 2024-01-29 ENCOUNTER — LAB (OUTPATIENT)
Dept: FAMILY MEDICINE CLINIC | Facility: CLINIC | Age: 49
End: 2024-01-29
Payer: COMMERCIAL

## 2024-01-29 ENCOUNTER — OFFICE VISIT (OUTPATIENT)
Dept: FAMILY MEDICINE CLINIC | Facility: CLINIC | Age: 49
End: 2024-01-29
Payer: COMMERCIAL

## 2024-01-29 VITALS
WEIGHT: 163.4 LBS | HEART RATE: 86 BPM | TEMPERATURE: 98.4 F | BODY MASS INDEX: 32.08 KG/M2 | OXYGEN SATURATION: 96 % | SYSTOLIC BLOOD PRESSURE: 133 MMHG | HEIGHT: 60 IN | DIASTOLIC BLOOD PRESSURE: 87 MMHG

## 2024-01-29 DIAGNOSIS — E78.5 HYPERLIPIDEMIA, UNSPECIFIED HYPERLIPIDEMIA TYPE: ICD-10-CM

## 2024-01-29 DIAGNOSIS — T46.4X5A COUGH DUE TO ACE INHIBITOR: ICD-10-CM

## 2024-01-29 DIAGNOSIS — R05.8 COUGH DUE TO ACE INHIBITOR: ICD-10-CM

## 2024-01-29 DIAGNOSIS — R00.2 PALPITATIONS: Primary | ICD-10-CM

## 2024-01-29 DIAGNOSIS — I10 PRIMARY HYPERTENSION: ICD-10-CM

## 2024-01-29 LAB
ALBUMIN SERPL-MCNC: 4.7 G/DL (ref 3.5–5.2)
ALBUMIN/GLOB SERPL: 1.8 G/DL
ALP SERPL-CCNC: 36 U/L (ref 39–117)
ALT SERPL W P-5'-P-CCNC: 29 U/L (ref 1–33)
ANION GAP SERPL CALCULATED.3IONS-SCNC: 13 MMOL/L (ref 5–15)
AST SERPL-CCNC: 17 U/L (ref 1–32)
BASOPHILS # BLD AUTO: 0.1 10*3/MM3 (ref 0–0.2)
BASOPHILS NFR BLD AUTO: 0.9 % (ref 0–1.5)
BILIRUB SERPL-MCNC: 0.2 MG/DL (ref 0–1.2)
BUN SERPL-MCNC: 12 MG/DL (ref 6–20)
BUN/CREAT SERPL: 16.4 (ref 7–25)
CALCIUM SPEC-SCNC: 10.2 MG/DL (ref 8.6–10.5)
CHLORIDE SERPL-SCNC: 105 MMOL/L (ref 98–107)
CO2 SERPL-SCNC: 23 MMOL/L (ref 22–29)
CREAT SERPL-MCNC: 0.73 MG/DL (ref 0.57–1)
DEPRECATED RDW RBC AUTO: 39.7 FL (ref 37–54)
EGFRCR SERPLBLD CKD-EPI 2021: 100.3 ML/MIN/1.73
EOSINOPHIL # BLD AUTO: 0.54 10*3/MM3 (ref 0–0.4)
EOSINOPHIL NFR BLD AUTO: 5.1 % (ref 0.3–6.2)
ERYTHROCYTE [DISTWIDTH] IN BLOOD BY AUTOMATED COUNT: 12.1 % (ref 12.3–15.4)
GLOBULIN UR ELPH-MCNC: 2.6 GM/DL
GLUCOSE SERPL-MCNC: 98 MG/DL (ref 65–99)
HCT VFR BLD AUTO: 41.2 % (ref 34–46.6)
HGB BLD-MCNC: 14 G/DL (ref 12–15.9)
IMM GRANULOCYTES # BLD AUTO: 0.03 10*3/MM3 (ref 0–0.05)
IMM GRANULOCYTES NFR BLD AUTO: 0.3 % (ref 0–0.5)
LYMPHOCYTES # BLD AUTO: 4.33 10*3/MM3 (ref 0.7–3.1)
LYMPHOCYTES NFR BLD AUTO: 41.1 % (ref 19.6–45.3)
MCH RBC QN AUTO: 30.9 PG (ref 26.6–33)
MCHC RBC AUTO-ENTMCNC: 34 G/DL (ref 31.5–35.7)
MCV RBC AUTO: 90.9 FL (ref 79–97)
MONOCYTES # BLD AUTO: 0.65 10*3/MM3 (ref 0.1–0.9)
MONOCYTES NFR BLD AUTO: 6.2 % (ref 5–12)
NEUTROPHILS NFR BLD AUTO: 4.88 10*3/MM3 (ref 1.7–7)
NEUTROPHILS NFR BLD AUTO: 46.4 % (ref 42.7–76)
NRBC BLD AUTO-RTO: 0 /100 WBC (ref 0–0.2)
PLATELET # BLD AUTO: 313 10*3/MM3 (ref 140–450)
PMV BLD AUTO: 10.7 FL (ref 6–12)
POTASSIUM SERPL-SCNC: 4.1 MMOL/L (ref 3.5–5.2)
PROT SERPL-MCNC: 7.3 G/DL (ref 6–8.5)
RBC # BLD AUTO: 4.53 10*6/MM3 (ref 3.77–5.28)
SODIUM SERPL-SCNC: 141 MMOL/L (ref 136–145)
TSH SERPL DL<=0.05 MIU/L-ACNC: 3.39 UIU/ML (ref 0.27–4.2)
WBC NRBC COR # BLD AUTO: 10.53 10*3/MM3 (ref 3.4–10.8)

## 2024-01-29 PROCEDURE — 80050 GENERAL HEALTH PANEL: CPT | Performed by: FAMILY MEDICINE

## 2024-01-29 PROCEDURE — 99214 OFFICE O/P EST MOD 30 MIN: CPT | Performed by: FAMILY MEDICINE

## 2024-01-29 PROCEDURE — 36415 COLL VENOUS BLD VENIPUNCTURE: CPT | Performed by: FAMILY MEDICINE

## 2024-01-29 RX ORDER — LOSARTAN POTASSIUM 50 MG/1
50 TABLET ORAL DAILY
Qty: 90 TABLET | Refills: 1 | Status: SHIPPED | OUTPATIENT
Start: 2024-01-29

## 2024-01-29 NOTE — ASSESSMENT & PLAN NOTE
Hypertension is worsening.  Dietary sodium restriction.  Regular aerobic exercise.  Medication changes per orders.  Blood pressure will be reassessed in 4 weeks.

## 2024-01-29 NOTE — PROGRESS NOTES
"Chief Complaint  Shortness of Breath (Chest pressure -  x 3 weeks ) and Hypertension    Subjective        Evelyn Garcia presents to [facility]  History of Present Illness  143/101, 151/106 blood pressure readings.   Shortness of Breath  This is a new problem. The current episode started more than 1 month ago. The problem occurs intermittently. The problem has been come and goes. Associated symptoms include chest pain. Pertinent negatives include no fever, headaches, swollen glands, syncope or wheezing.   Hypertension  This is a chronic problem. The current episode started more than 1 year ago. The problem has been worse since onset. Associated symptoms include chest pain, palpitations and shortness of breath. Pertinent negatives include no headaches, malaise/fatigue or peripheral edema. There are no associated agents to hypertension. Current antihypertension treatment includes ACE inhibitors.   Palpitations   This is a new problem. The current episode started more than 1 month ago. The problem occurs intermittently. The problem has been gradually worsening. Nothing aggravates the symptoms. Associated symptoms include chest pain and shortness of breath. Pertinent negatives include no fever, malaise/fatigue or syncope.       Objective   Vital Signs:  /87 (BP Location: Left arm, Patient Position: Sitting, Cuff Size: Adult)   Pulse 86   Temp 98.4 °F (36.9 °C) (Infrared)   Ht 152.4 cm (60\")   Wt 74.1 kg (163 lb 6.4 oz)   SpO2 96%   BMI 31.91 kg/m²   Estimated body mass index is 31.91 kg/m² as calculated from the following:    Height as of this encounter: 152.4 cm (60\").    Weight as of this encounter: 74.1 kg (163 lb 6.4 oz).        BMI is >= 30 and <35. (Class 1 Obesity). The following options were offered after discussion;: exercise counseling/recommendations        Physical Exam  Vitals and nursing note reviewed.   Constitutional:       General: She is not in acute " distress.     Appearance: She is well-developed.   HENT:      Head: Normocephalic.   Eyes:      General: Lids are normal.      Conjunctiva/sclera: Conjunctivae normal.   Neck:      Thyroid: No thyroid mass or thyromegaly.      Trachea: Trachea normal.   Cardiovascular:      Rate and Rhythm: Normal rate and regular rhythm.      Heart sounds: Normal heart sounds.   Pulmonary:      Effort: Pulmonary effort is normal.      Breath sounds: Normal breath sounds.   Musculoskeletal:      Cervical back: Normal range of motion.   Lymphadenopathy:      Cervical: No cervical adenopathy.   Skin:     General: Skin is warm and dry.   Neurological:      Mental Status: She is alert and oriented to person, place, and time.   Psychiatric:         Attention and Perception: She is attentive.         Mood and Affect: Mood normal.         Speech: Speech normal.         Behavior: Behavior normal.        Result Review :    The following data was reviewed by: Kathia Cooper MD on 01/29/2024:  Common labs          7/12/2023    12:52   Common Labs   Glucose 77    BUN 14    Creatinine 0.62    Sodium 139    Potassium 4.1    Chloride 104    Calcium 10.1    Albumin 4.6    Total Bilirubin 0.3    Alkaline Phosphatase 30    AST (SGOT) 16    ALT (SGPT) 19    WBC 10.38    Hemoglobin 13.2    Hematocrit 37.8    Platelets 265    Total Cholesterol 212    Triglycerides 236    HDL Cholesterol 49    LDL Cholesterol  122    Hemoglobin A1C 5.70                   Assessment and Plan     Diagnoses and all orders for this visit:    1. Palpitations (Primary)  Assessment & Plan:  Check Holter monitor and labs as ordered    Orders:  -     Adult Transthoracic Echo Complete W/ Cont if Necessary Per Protocol; Future  -     Holter Monitor - 72 Hour Up To 15 Days  -     TSH  -     CBC & Differential  -     Comprehensive Metabolic Panel    2. Primary hypertension  Assessment & Plan:  Hypertension is worsening.  Dietary sodium restriction.  Regular aerobic  exercise.  Medication changes per orders.  Blood pressure will be reassessed in 4 weeks.    Orders:  -     losartan (Cozaar) 50 MG tablet; Take 1 tablet by mouth Daily.  Dispense: 90 tablet; Refill: 1  -     Adult Transthoracic Echo Complete W/ Cont if Necessary Per Protocol; Future  -     Holter Monitor - 72 Hour Up To 15 Days  -     TSH  -     CBC & Differential  -     Comprehensive Metabolic Panel    3. Hyperlipidemia, unspecified hyperlipidemia type  -     Adult Transthoracic Echo Complete W/ Cont if Necessary Per Protocol; Future  -     Holter Monitor - 72 Hour Up To 15 Days  -     TSH  -     CBC & Differential  -     Comprehensive Metabolic Panel    4. Cough due to ACE inhibitor  Assessment & Plan:  Change lisinopril to Losartan.               Follow Up     Return in about 6 weeks (around 3/11/2024) for Recheck.  Patient was given instructions and counseling regarding her condition or for health maintenance advice. Please see specific information pulled into the AVS if appropriate.         Answers submitted by the patient for this visit:  Other (Submitted on 1/25/2024)  Please describe your symptoms.: I have had strep throat twice in a 3 week period took two different antibiotics comes back couple days after antibiotics complete coughing till i puke no control of bladder during these coughing spells. B/P been running high and pain in my back.  Have you had these symptoms before?: No  How long have you been having these symptoms?: Greater than 2 weeks  Please list any medications you are currently taking for this condition.: I have taken DayQuil Thera flu tesslon pearls Tylenol now taking cordicininehp  Please describe any probable cause for these symptoms. : I don’t know  Primary Reason for Visit (Submitted on 1/25/2024)  What is the primary reason for your visit?: Other

## 2024-03-06 ENCOUNTER — APPOINTMENT (OUTPATIENT)
Dept: CT IMAGING | Facility: HOSPITAL | Age: 49
End: 2024-03-06
Payer: COMMERCIAL

## 2024-03-06 ENCOUNTER — HOSPITAL ENCOUNTER (EMERGENCY)
Facility: HOSPITAL | Age: 49
Discharge: HOME | End: 2024-03-06
Attending: EMERGENCY MEDICINE | Admitting: EMERGENCY MEDICINE
Payer: COMMERCIAL

## 2024-03-06 VITALS
WEIGHT: 163 LBS | HEART RATE: 86 BPM | TEMPERATURE: 98.2 F | DIASTOLIC BLOOD PRESSURE: 86 MMHG | BODY MASS INDEX: 32 KG/M2 | SYSTOLIC BLOOD PRESSURE: 118 MMHG | RESPIRATION RATE: 18 BRPM | HEIGHT: 60 IN | OXYGEN SATURATION: 91 %

## 2024-03-06 DIAGNOSIS — N39.0 URINARY TRACT INFECTION WITHOUT HEMATURIA, SITE UNSPECIFIED: ICD-10-CM

## 2024-03-06 DIAGNOSIS — R31.9 HEMATURIA, UNSPECIFIED TYPE: Primary | ICD-10-CM

## 2024-03-06 LAB
BACTERIA UR QL AUTO: ABNORMAL /HPF
BILIRUB UR QL STRIP: NEGATIVE
CLARITY UR: CLEAR
COLOR UR: YELLOW
GLUCOSE UR STRIP-MCNC: NEGATIVE MG/DL
HGB UR QL STRIP.AUTO: ABNORMAL
HYALINE CASTS UR QL AUTO: ABNORMAL /LPF
KETONES UR QL STRIP: NEGATIVE
LEUKOCYTE ESTERASE UR QL STRIP.AUTO: ABNORMAL
NITRITE UR QL STRIP: NEGATIVE
PH UR STRIP.AUTO: 7 [PH] (ref 5–8)
PROT UR QL STRIP: NEGATIVE
RBC # UR STRIP: ABNORMAL /HPF
REF LAB TEST METHOD: ABNORMAL
SP GR UR STRIP: <=1.005 (ref 1–1.03)
SQUAMOUS #/AREA URNS HPF: ABNORMAL /HPF
UROBILINOGEN UR QL STRIP: ABNORMAL
WBC # UR STRIP: ABNORMAL /HPF

## 2024-03-06 PROCEDURE — 74176 CT ABD & PELVIS W/O CONTRAST: CPT

## 2024-03-06 PROCEDURE — 87186 SC STD MICRODIL/AGAR DIL: CPT | Performed by: EMERGENCY MEDICINE

## 2024-03-06 PROCEDURE — 87077 CULTURE AEROBIC IDENTIFY: CPT | Performed by: EMERGENCY MEDICINE

## 2024-03-06 PROCEDURE — 81001 URINALYSIS AUTO W/SCOPE: CPT | Performed by: EMERGENCY MEDICINE

## 2024-03-06 PROCEDURE — 87086 URINE CULTURE/COLONY COUNT: CPT | Performed by: EMERGENCY MEDICINE

## 2024-03-06 PROCEDURE — 99284 EMERGENCY DEPT VISIT MOD MDM: CPT

## 2024-03-06 RX ORDER — SODIUM CHLORIDE 0.9 % (FLUSH) 0.9 %
10 SYRINGE (ML) INJECTION AS NEEDED
Status: DISCONTINUED | OUTPATIENT
Start: 2024-03-06 | End: 2024-03-06 | Stop reason: HOSPADM

## 2024-03-06 RX ORDER — NITROFURANTOIN 25; 75 MG/1; MG/1
100 CAPSULE ORAL 2 TIMES DAILY
Qty: 10 CAPSULE | Refills: 0 | Status: SHIPPED | OUTPATIENT
Start: 2024-03-06

## 2024-03-06 RX ORDER — NAPROXEN 375 MG/1
375 TABLET ORAL 2 TIMES DAILY PRN
Qty: 14 TABLET | Refills: 0 | Status: SHIPPED | OUTPATIENT
Start: 2024-03-06

## 2024-03-06 NOTE — ED PROVIDER NOTES
Subjective   History of Present Illness  Patient is a 49-year-old female complaining of mild dysuria with blood in her urine today.  Denies cough fever vomit diarrhea or other complaint      Review of Systems    Past Medical History:   Diagnosis Date    Acid reflux disease     Anxiety     COVID     Hypertension        Allergies   Allergen Reactions    Butorphanol Unknown - High Severity     Slows breathing-unresponsive      Drug [Casirivimab-Imdevimab] Other (See Comments)     Patient hot, nauseated, throat swelling, chest pain, headache,shaking uncontrollably    Hydromorphone Hcl Unknown - High Severity     Slows breathing -unresponsive    Monoclonal Antibodies Anaphylaxis     Patient had monoclonal antibody infusion and stated she was sent to er with reaction    Nalbuphine Unknown - High Severity    Latex, Natural Rubber Hives       Past Surgical History:   Procedure Laterality Date     SECTION      X2    CHOLECYSTECTOMY      ENDOSCOPY N/A 7/3/2022    Procedure: ESOPHAGOGASTRODUODENOSCOPY;  Surgeon: Ashly Coronado MD;  Location: [facility] ENDOSCOPY;  Service: Gastroenterology;  Laterality: N/A;  post op: Normal EGD    HERNIA REPAIR      X3    HYSTERECTOMY      RECTAL POLYPECTOMY         Family History   Problem Relation Age of Onset    Heart disease Mother     Heart disease Father        Social History     Socioeconomic History    Marital status:    Tobacco Use    Smoking status: Never    Smokeless tobacco: Never   Vaping Use    Vaping status: Never Used   Substance and Sexual Activity    Alcohol use: Yes     Comment: SOCIAL    Drug use: Never    Sexual activity: Yes           Objective   Physical Exam  Lungs are clear.  Heart has regular rhythm.  Abdomen is soft with minimal suprapubic tenderness.  Patient has normal bowel sounds.  Back has no CVA tenderness remainder of exam is unremarkable.  Procedures           ED Course      Results for orders placed or performed during the hospital  encounter of 03/06/24   Urinalysis With Microscopic If Indicated (No Culture) - Urine, Clean Catch    Specimen: Urine, Clean Catch   Result Value Ref Range    Color, UA Yellow Yellow, Straw    Appearance, UA Clear Clear    pH, UA 7.0 5.0 - 8.0    Specific Gravity, UA <=1.005 1.005 - 1.030    Glucose, UA Negative Negative    Ketones, UA Negative Negative    Bilirubin, UA Negative Negative    Blood, UA Large (3+) (A) Negative    Protein, UA Negative Negative    Leuk Esterase, UA Large (3+) (A) Negative    Nitrite, UA Negative Negative    Urobilinogen, UA 0.2 E.U./dL 0.2 - 1.0 E.U./dL   Urinalysis, Microscopic Only - Urine, Clean Catch    Specimen: Urine, Clean Catch   Result Value Ref Range    RBC, UA 11-20 (A) None Seen, 0-2 /HPF    WBC, UA Too Numerous to Count (A) None Seen, 0-2 /HPF    Bacteria, UA 4+ (A) None Seen /HPF    Squamous Epithelial Cells, UA 0-2 None Seen, 0-2 /HPF    Hyaline Casts, UA None Seen None Seen /LPF    Methodology Automated Microscopy      CT Abdomen Pelvis Without Contrast    Result Date: 3/6/2024  Impression: 1.No acute intra-abdominal or intrapelvic abnormality. 2.Multiple stable incidental chronic ancillary findings are detailed above. Electronically Signed: Theodore Robertson DO  3/6/2024 12:01 PM EST  Workstation ID: [id number]                                            Medical Decision Making  CT scan and pelvis without contrast shows no hydronephrosis or obstructive change on my reading.  UA is 4+ bacteria.  Urine culture was obtained.  Patient be discharged.  She will place on Macrobid and Naprosyn.  She will see MD for recheck.    Amount and/or Complexity of Data Reviewed  Labs: ordered. Decision-making details documented in ED Course.  Radiology: ordered and independent interpretation performed.    Risk  Prescription drug management.        Final diagnoses:   Hematuria, unspecified type   Urinary tract infection without hematuria, site unspecified       ED Disposition  ED Disposition        ED Disposition   Discharge    Condition   Stable    Comment   --               No follow-up provider specified.       Medication List        New Prescriptions      naproxen 375 MG tablet  Commonly known as: NAPROSYN  Take 1 tablet by mouth 2 (Two) Times a Day As Needed for Moderate Pain.     nitrofurantoin (macrocrystal-monohydrate) 100 MG capsule  Commonly known as: MACROBID  Take 1 capsule by mouth 2 (Two) Times a Day.               Where to Get Your Medications        Information about where to get these medications is not yet available    Ask your nurse or doctor about these medications  naproxen 375 MG tablet  nitrofurantoin (macrocrystal-monohydrate) 100 MG capsule            Kevin Richard MD  03/06/24 0696

## 2024-03-08 LAB — BACTERIA SPEC AEROBE CULT: ABNORMAL

## 2024-03-08 NOTE — PROGRESS NOTES
Patient urine culture resulted with E. coli. Susceptible to Nitrofurantoin. Patient was given Rx for nitrofurantoin. Therapy is appropriate coverage. No further follow-up required..    Microbiology Results (last 10 days)       Procedure Component Value - Date/Time    Urine Culture - Urine, Urine, Clean Catch [[id number]]  (Abnormal)  (Susceptibility) Collected: 03/06/24 1139    Lab Status: Final result Specimen: Urine, Clean Catch Updated: 03/08/24 1051     Urine Culture >100,000 CFU/mL Escherichia coli    Narrative:      Colonization of the urinary tract without infection is common. Treatment is discouraged unless the patient is symptomatic, pregnant, or undergoing an invasive urologic procedure.    Susceptibility        Escherichia coli      KEYSHAWN      Amikacin <=2 ug/ml Susceptible      Amoxicillin + Clavulanate 16 ug/ml Intermediate      Ampicillin >=32 ug/ml Resistant      Ampicillin + Sulbactam >=32 ug/ml Resistant      Cefazolin 16 ug/ml Susceptible      Cefepime <=1 ug/ml Susceptible      Ceftazidime <=1 ug/ml Susceptible      Ceftriaxone <=1 ug/ml Susceptible      Gentamicin >=16 ug/ml Resistant      Levofloxacin >=8 ug/ml Resistant      Nitrofurantoin <=16 ug/ml Susceptible      Piperacillin + Tazobactam 64 ug/ml Resistant      Tobramycin >=16 ug/ml Resistant      Trimethoprim + Sulfamethoxazole <=20 ug/ml Susceptible                                   Colleen Pollard, PharmD  3/8/2024 16:12 EST     Urinary retention Urinary retention

## 2024-04-19 RX ORDER — SERTRALINE HYDROCHLORIDE 100 MG/1
TABLET, FILM COATED ORAL
Qty: 90 TABLET | Refills: 1 | Status: SHIPPED | OUTPATIENT
Start: 2024-04-19

## 2024-04-24 ENCOUNTER — TELEPHONE (OUTPATIENT)
Dept: FAMILY MEDICINE CLINIC | Facility: CLINIC | Age: 49
End: 2024-04-24
Payer: COMMERCIAL

## 2024-04-24 RX ORDER — LISINOPRIL 10 MG/1
10 TABLET ORAL DAILY
Qty: 90 TABLET | Refills: 1 | Status: SHIPPED | OUTPATIENT
Start: 2024-04-24

## 2024-04-24 NOTE — TELEPHONE ENCOUNTER
Could you please change me back to the lisinapril for my b/p the losartun is making my belly and back hurt all the time.  Thank you.

## 2024-06-10 ENCOUNTER — TELEPHONE (OUTPATIENT)
Dept: FAMILY MEDICINE CLINIC | Facility: CLINIC | Age: 49
End: 2024-06-10
Payer: COMMERCIAL

## 2024-06-10 DIAGNOSIS — Z00.00 WELLNESS EXAMINATION: Primary | ICD-10-CM

## 2024-06-10 NOTE — TELEPHONE ENCOUNTER
Could I get my mammogram and colonoscopy scheduled? I need to get my blood work done also. Thank you.

## 2024-06-21 ENCOUNTER — HOSPITAL ENCOUNTER (OUTPATIENT)
Dept: MAMMOGRAPHY | Facility: HOSPITAL | Age: 49
Discharge: HOME | End: 2024-06-21
Admitting: FAMILY MEDICINE
Payer: COMMERCIAL

## 2024-06-21 PROCEDURE — 77063 BREAST TOMOSYNTHESIS BI: CPT

## 2024-06-21 PROCEDURE — 77067 SCR MAMMO BI INCL CAD: CPT

## 2024-07-03 RX ORDER — ATORVASTATIN CALCIUM 40 MG/1
40 TABLET, FILM COATED ORAL DAILY
Qty: 90 TABLET | Refills: 3 | Status: SHIPPED | OUTPATIENT
Start: 2024-07-03

## 2024-07-03 NOTE — TELEPHONE ENCOUNTER
I accidentally threw my bottle away now I don’t have the prescription number to call in and get a refill on my atorvastatin could you call it in for me please?

## 2024-08-05 ENCOUNTER — HOSPITAL ENCOUNTER (EMERGENCY)
Facility: HOSPITAL | Age: 49
Discharge: HOME | End: 2024-08-05
Attending: EMERGENCY MEDICINE | Admitting: EMERGENCY MEDICINE
Payer: COMMERCIAL

## 2024-08-05 ENCOUNTER — APPOINTMENT (OUTPATIENT)
Dept: GENERAL RADIOLOGY | Facility: HOSPITAL | Age: 49
End: 2024-08-05
Payer: COMMERCIAL

## 2024-08-05 VITALS
SYSTOLIC BLOOD PRESSURE: 113 MMHG | WEIGHT: 163.8 LBS | TEMPERATURE: 98.6 F | BODY MASS INDEX: 32.16 KG/M2 | HEART RATE: 85 BPM | HEIGHT: 60 IN | OXYGEN SATURATION: 93 % | RESPIRATION RATE: 16 BRPM | DIASTOLIC BLOOD PRESSURE: 80 MMHG

## 2024-08-05 DIAGNOSIS — R09.1 PLEURISY: Primary | ICD-10-CM

## 2024-08-05 LAB
ALBUMIN SERPL-MCNC: 4.6 G/DL (ref 3.5–5.2)
ALBUMIN/GLOB SERPL: 1.4 G/DL
ALP SERPL-CCNC: 40 U/L (ref 39–117)
ALT SERPL W P-5'-P-CCNC: 33 U/L (ref 1–33)
ANION GAP SERPL CALCULATED.3IONS-SCNC: 11.4 MMOL/L (ref 5–15)
AST SERPL-CCNC: 18 U/L (ref 1–32)
BASOPHILS # BLD AUTO: 0.07 10*3/MM3 (ref 0–0.2)
BASOPHILS NFR BLD AUTO: 0.5 % (ref 0–1.5)
BILIRUB SERPL-MCNC: 0.4 MG/DL (ref 0–1.2)
BUN SERPL-MCNC: 12 MG/DL (ref 6–20)
BUN/CREAT SERPL: 20 (ref 7–25)
CALCIUM SPEC-SCNC: 9.7 MG/DL (ref 8.6–10.5)
CHLORIDE SERPL-SCNC: 102 MMOL/L (ref 98–107)
CO2 SERPL-SCNC: 24.6 MMOL/L (ref 22–29)
CREAT SERPL-MCNC: 0.6 MG/DL (ref 0.57–1)
D DIMER PPP FEU-MCNC: 0.37 MG/L (FEU) (ref 0–0.5)
DEPRECATED RDW RBC AUTO: 42.1 FL (ref 37–54)
EGFRCR SERPLBLD CKD-EPI 2021: 109.5 ML/MIN/1.73
EOSINOPHIL # BLD AUTO: 0.47 10*3/MM3 (ref 0–0.4)
EOSINOPHIL NFR BLD AUTO: 3.5 % (ref 0.3–6.2)
ERYTHROCYTE [DISTWIDTH] IN BLOOD BY AUTOMATED COUNT: 12.2 % (ref 12.3–15.4)
GLOBULIN UR ELPH-MCNC: 3.2 GM/DL
GLUCOSE SERPL-MCNC: 120 MG/DL (ref 65–99)
HCT VFR BLD AUTO: 42 % (ref 34–46.6)
HGB BLD-MCNC: 14.4 G/DL (ref 12–15.9)
HOLD SPECIMEN: NORMAL
IMM GRANULOCYTES # BLD AUTO: 0.05 10*3/MM3 (ref 0–0.05)
IMM GRANULOCYTES NFR BLD AUTO: 0.4 % (ref 0–0.5)
LYMPHOCYTES # BLD AUTO: 3.01 10*3/MM3 (ref 0.7–3.1)
LYMPHOCYTES NFR BLD AUTO: 22.6 % (ref 19.6–45.3)
MCH RBC QN AUTO: 32.1 PG (ref 26.6–33)
MCHC RBC AUTO-ENTMCNC: 34.3 G/DL (ref 31.5–35.7)
MCV RBC AUTO: 93.5 FL (ref 79–97)
MONOCYTES # BLD AUTO: 0.77 10*3/MM3 (ref 0.1–0.9)
MONOCYTES NFR BLD AUTO: 5.8 % (ref 5–12)
NEUTROPHILS NFR BLD AUTO: 67.2 % (ref 42.7–76)
NEUTROPHILS NFR BLD AUTO: 8.92 10*3/MM3 (ref 1.7–7)
NRBC BLD AUTO-RTO: 0 /100 WBC (ref 0–0.2)
PLATELET # BLD AUTO: 294 10*3/MM3 (ref 140–450)
PMV BLD AUTO: 10.6 FL (ref 6–12)
POTASSIUM SERPL-SCNC: 3.9 MMOL/L (ref 3.5–5.2)
PROT SERPL-MCNC: 7.8 G/DL (ref 6–8.5)
RBC # BLD AUTO: 4.49 10*6/MM3 (ref 3.77–5.28)
SODIUM SERPL-SCNC: 138 MMOL/L (ref 136–145)
TROPONIN T SERPL HS-MCNC: 7 NG/L
WBC NRBC COR # BLD AUTO: 13.29 10*3/MM3 (ref 3.4–10.8)

## 2024-08-05 PROCEDURE — 93005 ELECTROCARDIOGRAM TRACING: CPT | Performed by: EMERGENCY MEDICINE

## 2024-08-05 PROCEDURE — 25010000002 KETOROLAC TROMETHAMINE PER 15 MG: Performed by: EMERGENCY MEDICINE

## 2024-08-05 PROCEDURE — 84484 ASSAY OF TROPONIN QUANT: CPT | Performed by: EMERGENCY MEDICINE

## 2024-08-05 PROCEDURE — 71045 X-RAY EXAM CHEST 1 VIEW: CPT

## 2024-08-05 PROCEDURE — 85025 COMPLETE CBC W/AUTO DIFF WBC: CPT | Performed by: EMERGENCY MEDICINE

## 2024-08-05 PROCEDURE — 96374 THER/PROPH/DIAG INJ IV PUSH: CPT

## 2024-08-05 PROCEDURE — 85379 FIBRIN DEGRADATION QUANT: CPT | Performed by: EMERGENCY MEDICINE

## 2024-08-05 PROCEDURE — 80053 COMPREHEN METABOLIC PANEL: CPT | Performed by: EMERGENCY MEDICINE

## 2024-08-05 PROCEDURE — 93005 ELECTROCARDIOGRAM TRACING: CPT

## 2024-08-05 PROCEDURE — 99284 EMERGENCY DEPT VISIT MOD MDM: CPT

## 2024-08-05 RX ORDER — SODIUM CHLORIDE 0.9 % (FLUSH) 0.9 %
10 SYRINGE (ML) INJECTION AS NEEDED
Status: DISCONTINUED | OUTPATIENT
Start: 2024-08-05 | End: 2024-08-05 | Stop reason: HOSPADM

## 2024-08-05 RX ORDER — KETOROLAC TROMETHAMINE 30 MG/ML
15 INJECTION, SOLUTION INTRAMUSCULAR; INTRAVENOUS ONCE
Status: COMPLETED | OUTPATIENT
Start: 2024-08-05 | End: 2024-08-05

## 2024-08-05 RX ADMIN — KETOROLAC TROMETHAMINE 15 MG: 30 INJECTION, SOLUTION INTRAMUSCULAR at 09:06

## 2024-08-05 NOTE — ED PROVIDER NOTES
Subjective   History of Present Illness  49-year-old female presents for pleuritic chest pain.  Started on right side on Wednesday and was intermittent.  More constant since Friday.  Worsens more overnight.  Starts in back and radiates around to right chest.  Has some on left but not nearly as significant on the right.  No new medications.  Denies any leg swelling.  Denies any trauma to chest.  No fevers.  States she has had a slight cough that she thought was related to her allergies been going on for quite some time before the pain.  Not a smoker.  Review of Systems  See HPI.  Past Medical History:   Diagnosis Date    Acid reflux disease     Anxiety     COVID     Hypertension        Allergies   Allergen Reactions    Butorphanol Unknown - High Severity     Slows breathing-unresponsive      Drug [Casirivimab-Imdevimab] Other (See Comments)     Patient hot, nauseated, throat swelling, chest pain, headache,shaking uncontrollably    Hydromorphone Hcl Unknown - High Severity     Slows breathing -unresponsive    Monoclonal Antibodies Anaphylaxis     Patient had monoclonal antibody infusion and stated she was sent to er with reaction    Nalbuphine Unknown - High Severity    Latex, Natural Rubber Hives       Past Surgical History:   Procedure Laterality Date     SECTION      X2    CHOLECYSTECTOMY      ENDOSCOPY N/A 7/3/2022    Procedure: ESOPHAGOGASTRODUODENOSCOPY;  Surgeon: Ashly Coronado MD;  Location: [facility] ENDOSCOPY;  Service: Gastroenterology;  Laterality: N/A;  post op: Normal EGD    HERNIA REPAIR      X3    HYSTERECTOMY      RECTAL POLYPECTOMY         Family History   Problem Relation Age of Onset    Heart disease Mother     Heart disease Father        Social History     Socioeconomic History    Marital status:    Tobacco Use    Smoking status: Never    Smokeless tobacco: Never   Vaping Use    Vaping status: Never Used   Substance and Sexual Activity    Alcohol use: Yes     Comment: SOCIAL     "Drug use: Never    Sexual activity: Yes           Objective   Physical Exam  No acute distress, regular rate and rhythm, no tachypnea or increased work of breathing, abdomen soft and nontender without rebound or guarding, clear to auscultation bilaterally, no lower extremity edema, no tenderness palpation over calves, intact PT and DP pulses bilaterally.  Procedures           ED Course    /80   Pulse 85   Temp 98.6 °F (37 °C) (Oral)   Resp 16   Ht 152.4 cm (60\")   Wt 74.3 kg (163 lb 12.8 oz)   LMP  (LMP Unknown)   SpO2 93%   BMI 31.99 kg/m²   Labs Reviewed   COMPREHENSIVE METABOLIC PANEL - Abnormal; Notable for the following components:       Result Value    Glucose 120 (*)     All other components within normal limits    Narrative:     GFR Normal >60  Chronic Kidney Disease <60  Kidney Failure <15     CBC WITH AUTO DIFFERENTIAL - Abnormal; Notable for the following components:    WBC 13.29 (*)     RDW 12.2 (*)     Neutrophils, Absolute 8.92 (*)     Eosinophils, Absolute 0.47 (*)     All other components within normal limits   SINGLE HS TROPONIN T - Normal    Narrative:     High Sensitive Troponin T Reference Range:  <14.0 ng/L- Negative Female for AMI  <22.0 ng/L- Negative Male for AMI  >=14 - Abnormal Female indicating possible myocardial injury.  >=22 - Abnormal Male indicating possible myocardial injury.   Clinicians would have to utilize clinical acumen, EKG, Troponin, and serial changes to determine if it is an Acute Myocardial Infarction or myocardial injury due to an underlying chronic condition.        D-DIMER, QUANTITATIVE - Normal    Narrative:     According to the assay 's published package insert, a normal (<0.50 mg/L (FEU)) D-dimer result in conjunction with a non-high clinical probability assessment, excludes deep vein thrombosis (DVT) and pulmonary embolism (PE) with high sensitivity.    D-dimer values increase with age and this can make VTE exclusion of an older population " "difficult. To address this, the [employer], based on best available evidence and recent guidelines, recommends that clinicians use age-adjusted D-dimer thresholds in patients greater than 50 years of age with: a) a low probability of PE who do not meet all Pulmonary Embolism Rule Out Criteria, or b) in those with intermediate probability of PE.   The formula for an age-adjusted D-dimer cut-off is \"age/100\".  For example, a 60 year old patient would have an age-adjusted cut-off of 0.60 mg/L (FEU) and an 80 year old 0.80 mg/L (FEU).   CBC AND DIFFERENTIAL    Narrative:     The following orders were created for panel order CBC & Differential.  Procedure                               Abnormality         Status                     ---------                               -----------         ------                     CBC Auto Differential[[id number]]        Abnormal            Final result                 Please view results for these tests on the individual orders.   EXTRA TUBES    Narrative:     The following orders were created for panel order Extra Tubes.  Procedure                               Abnormality         Status                     ---------                               -----------         ------                     Gold Top - SST[[id number]]                                   Final result                 Please view results for these tests on the individual orders.   GOLD TOP - SST     Medications   ketorolac (TORADOL) injection 15 mg (15 mg Intravenous Given 8/5/24 0906)     XR Chest 1 View    Result Date: 8/5/2024  Impression: No acute process. Electronically Signed: Aniyah Schuster MD  8/5/2024 9:29 AM EDT  Workstation ID: [id number]                                            Medical Decision Making  Problems Addressed:  Pleurisy: complicated acute illness or injury    Amount and/or Complexity of Data Reviewed  Labs: ordered.  Radiology: ordered.  ECG/medicine tests: " ordered.    Risk  Prescription drug management.    EKG interpretation: 8:42 AM, rate 78, normal sinus rhythm, normal axis, normal normals, no acute ischemic changes.    My interpretation of chest x-ray is no focal infiltrate or pneumothorax.  See system for radiology interpretation.    Patient with mildly elevated white count.  No significant shift in CBC differential.  Troponin negative.  Dimer negative.  Nontoxic-appearing.  No respiratory distress.  Suspect pleurisy.  Advised NSAIDs.  Will DC.    Final diagnoses:   Pleurisy       ED Disposition  ED Disposition       ED Disposition   Discharge    Condition   Stable    Comment   --               Kathia Cooper MD  [address] [facility] [address]  [place] [address]  [phone]    In 3 days           Medication List      No changes were made to your prescriptions during this visit.            Freedom Avitia MD  08/06/24 7023

## 2024-08-06 LAB
QT INTERVAL: 385 MS
QTC INTERVAL: 440 MS

## 2024-08-09 ENCOUNTER — OFFICE VISIT (OUTPATIENT)
Dept: FAMILY MEDICINE CLINIC | Facility: CLINIC | Age: 49
End: 2024-08-09
Payer: COMMERCIAL

## 2024-08-09 VITALS
OXYGEN SATURATION: 98 % | BODY MASS INDEX: 32.04 KG/M2 | WEIGHT: 163.2 LBS | HEART RATE: 71 BPM | SYSTOLIC BLOOD PRESSURE: 130 MMHG | DIASTOLIC BLOOD PRESSURE: 88 MMHG | HEIGHT: 60 IN | TEMPERATURE: 97.6 F

## 2024-08-09 DIAGNOSIS — R07.81 PLEURITIC CHEST PAIN: Primary | ICD-10-CM

## 2024-08-09 DIAGNOSIS — R19.7 DIARRHEA OF PRESUMED INFECTIOUS ORIGIN: ICD-10-CM

## 2024-08-09 DIAGNOSIS — R35.0 URINE FREQUENCY: ICD-10-CM

## 2024-08-09 LAB
BILIRUB BLD-MCNC: NEGATIVE MG/DL
CLARITY, POC: CLEAR
COLOR UR: YELLOW
EXPIRATION DATE: ABNORMAL
GLUCOSE UR STRIP-MCNC: NEGATIVE MG/DL
KETONES UR QL: NEGATIVE
LEUKOCYTE EST, POC: NEGATIVE
Lab: ABNORMAL
NITRITE UR-MCNC: NEGATIVE MG/ML
PH UR: 6.5 [PH] (ref 5–8)
PROT UR STRIP-MCNC: NEGATIVE MG/DL
RBC # UR STRIP: ABNORMAL /UL
SP GR UR: 1 (ref 1–1.03)
UROBILINOGEN UR QL: NORMAL

## 2024-08-09 PROCEDURE — 87186 SC STD MICRODIL/AGAR DIL: CPT | Performed by: FAMILY MEDICINE

## 2024-08-09 PROCEDURE — 99214 OFFICE O/P EST MOD 30 MIN: CPT | Performed by: FAMILY MEDICINE

## 2024-08-09 PROCEDURE — 81003 URINALYSIS AUTO W/O SCOPE: CPT | Performed by: FAMILY MEDICINE

## 2024-08-09 PROCEDURE — 87077 CULTURE AEROBIC IDENTIFY: CPT | Performed by: FAMILY MEDICINE

## 2024-08-09 PROCEDURE — 87086 URINE CULTURE/COLONY COUNT: CPT | Performed by: FAMILY MEDICINE

## 2024-08-09 RX ORDER — METRONIDAZOLE 500 MG/1
500 TABLET ORAL 3 TIMES DAILY
Qty: 21 TABLET | Refills: 0 | Status: SHIPPED | OUTPATIENT
Start: 2024-08-09

## 2024-08-09 NOTE — PROGRESS NOTES
"Chief Complaint  Urinary Frequency and Dysuria    Subjective        Evelyn Garcia presents to [facility]  History of Present Illness  Pain began in right back chest area.  Severe and sudden, stabbing pain. She went to the ER.  She took ibuprofen as instructed but she had to stop it due to abdominal pain. Pain is improving.     She has had watery diarrhea for the last 6 weeks. No recent travel.  No fever or chills.  Her  had a \"stomach bug\" but it just lasted a few days and resolved.  She has tried yogurt but it has not helped.   Urinary Frequency   Associated symptoms include frequency and nausea.   Dysuria   Associated symptoms include frequency and nausea.   Chest Pain   This is a new problem. The current episode started in the past 7 days. The onset quality is sudden. Associated symptoms include nausea.       Objective   Vital Signs:  /88 (BP Location: Left arm, Patient Position: Sitting, Cuff Size: Adult)   Pulse 71   Temp 97.6 °F (36.4 °C) (Infrared)   Ht 152.4 cm (60\")   Wt 74 kg (163 lb 3.2 oz)   SpO2 98%   BMI 31.87 kg/m²   Estimated body mass index is 31.87 kg/m² as calculated from the following:    Height as of this encounter: 152.4 cm (60\").    Weight as of this encounter: 74 kg (163 lb 3.2 oz).               Physical Exam  Vitals and nursing note reviewed.   Constitutional:       General: She is not in acute distress.     Appearance: She is well-developed.   HENT:      Head: Normocephalic.   Eyes:      General: Lids are normal.      Conjunctiva/sclera: Conjunctivae normal.   Neck:      Thyroid: No thyroid mass or thyromegaly.      Trachea: Trachea normal.   Cardiovascular:      Rate and Rhythm: Normal rate and regular rhythm.      Heart sounds: Normal heart sounds.   Pulmonary:      Effort: Pulmonary effort is normal.      Breath sounds: Normal breath sounds.   Abdominal:      Palpations: Abdomen is soft.   Musculoskeletal:      Cervical back: Normal " range of motion.   Lymphadenopathy:      Cervical: No cervical adenopathy.   Skin:     General: Skin is warm and dry.   Neurological:      Mental Status: She is alert and oriented to person, place, and time.   Psychiatric:         Attention and Perception: She is attentive.         Mood and Affect: Mood normal.         Speech: Speech normal.         Behavior: Behavior normal.        Result Review :    The following data was reviewed by: Kathia Cooper MD on 08/09/2024:  Common labs          1/29/2024    08:32 8/5/2024    09:01   Common Labs   Glucose 98  120    BUN 12  12    Creatinine 0.73  0.60    Sodium 141  138    Potassium 4.1  3.9    Chloride 105  102    Calcium 10.2  9.7    Albumin 4.7  4.6    Total Bilirubin 0.2  0.4    Alkaline Phosphatase 36  40    AST (SGOT) 17  18    ALT (SGPT) 29  33    WBC 10.53  13.29    Hemoglobin 14.0  14.4    Hematocrit 41.2  42.0    Platelets 313  294                   Assessment and Plan     Diagnoses and all orders for this visit:    1. Pleuritic chest pain (Primary)    2. Urine frequency  -     POCT urinalysis dipstick, automated  -     Urine Culture - Urine, Urine, Clean Catch    3. Diarrhea of presumed infectious origin  -     metroNIDAZOLE (Flagyl) 500 MG tablet; Take 1 tablet by mouth 3 (Three) Times a Day.  Dispense: 21 tablet; Refill: 0             Follow Up     No follow-ups on file.  Patient was given instructions and counseling regarding her condition or for health maintenance advice. Please see specific information pulled into the AVS if appropriate.

## 2024-08-11 LAB — BACTERIA SPEC AEROBE CULT: ABNORMAL

## 2024-08-12 RX ORDER — SULFAMETHOXAZOLE AND TRIMETHOPRIM 800; 160 MG/1; MG/1
1 TABLET ORAL 2 TIMES DAILY
Qty: 14 TABLET | Refills: 0 | Status: SHIPPED | OUTPATIENT
Start: 2024-08-12

## 2024-08-25 PROBLEM — R07.81 PLEURITIC CHEST PAIN: Status: ACTIVE | Noted: 2024-08-25

## 2024-08-25 PROBLEM — R35.0 URINE FREQUENCY: Status: ACTIVE | Noted: 2024-08-25

## 2024-09-05 ENCOUNTER — PREP FOR SURGERY (OUTPATIENT)
Dept: OTHER | Facility: HOSPITAL | Age: 49
End: 2024-09-05
Payer: COMMERCIAL

## 2024-09-05 DIAGNOSIS — Z12.11 ENCOUNTER FOR SCREENING COLONOSCOPY: Primary | ICD-10-CM

## 2024-09-06 RX ORDER — SODIUM, POTASSIUM,MAG SULFATES 17.5-3.13G
SOLUTION, RECONSTITUTED, ORAL ORAL
Qty: 177 ML | Refills: 0 | Status: SHIPPED | OUTPATIENT
Start: 2024-09-06

## 2024-09-06 RX ORDER — SODIUM CHLORIDE, SODIUM LACTATE, POTASSIUM CHLORIDE, CALCIUM CHLORIDE 600; 310; 30; 20 MG/100ML; MG/100ML; MG/100ML; MG/100ML
30 INJECTION, SOLUTION INTRAVENOUS CONTINUOUS
OUTPATIENT
Start: 2024-09-06

## 2024-09-13 PROBLEM — Z12.11 ENCOUNTER FOR SCREENING COLONOSCOPY: Status: ACTIVE | Noted: 2024-09-05

## 2024-10-15 RX ORDER — SERTRALINE HYDROCHLORIDE 100 MG/1
TABLET, FILM COATED ORAL
Qty: 90 TABLET | Refills: 1 | Status: SHIPPED | OUTPATIENT
Start: 2024-10-15

## 2024-10-15 RX ORDER — LISINOPRIL 10 MG/1
10 TABLET ORAL DAILY
Qty: 90 TABLET | Refills: 1 | Status: SHIPPED | OUTPATIENT
Start: 2024-10-15

## 2024-11-25 ENCOUNTER — LAB (OUTPATIENT)
Dept: FAMILY MEDICINE CLINIC | Facility: CLINIC | Age: 49
End: 2024-11-25
Payer: COMMERCIAL

## 2024-11-25 ENCOUNTER — OFFICE VISIT (OUTPATIENT)
Dept: FAMILY MEDICINE CLINIC | Facility: CLINIC | Age: 49
End: 2024-11-25
Payer: COMMERCIAL

## 2024-11-25 VITALS
WEIGHT: 164.2 LBS | OXYGEN SATURATION: 96 % | HEIGHT: 60 IN | SYSTOLIC BLOOD PRESSURE: 127 MMHG | DIASTOLIC BLOOD PRESSURE: 81 MMHG | TEMPERATURE: 98.4 F | BODY MASS INDEX: 32.24 KG/M2 | HEART RATE: 76 BPM

## 2024-11-25 DIAGNOSIS — N76.0 ACUTE VAGINITIS: ICD-10-CM

## 2024-11-25 DIAGNOSIS — N39.0 URINARY TRACT INFECTION WITHOUT HEMATURIA, SITE UNSPECIFIED: Primary | ICD-10-CM

## 2024-11-25 DIAGNOSIS — R31.9 HEMATURIA, UNSPECIFIED TYPE: ICD-10-CM

## 2024-11-25 DIAGNOSIS — Z83.49 FAMILY HISTORY OF THYROID DISEASE: ICD-10-CM

## 2024-11-25 DIAGNOSIS — N95.1 HOT FLASH, MENOPAUSAL: ICD-10-CM

## 2024-11-25 DIAGNOSIS — Z83.3 FAMILY HISTORY OF DIABETES MELLITUS: ICD-10-CM

## 2024-11-25 PROBLEM — R19.7 DIARRHEA OF PRESUMED INFECTIOUS ORIGIN: Status: ACTIVE | Noted: 2024-11-25

## 2024-11-25 LAB
ALBUMIN SERPL-MCNC: 4.2 G/DL (ref 3.5–5.2)
ALBUMIN/GLOB SERPL: 1.3 G/DL
ALP SERPL-CCNC: 35 U/L (ref 39–117)
ALT SERPL W P-5'-P-CCNC: 20 U/L (ref 1–33)
ANION GAP SERPL CALCULATED.3IONS-SCNC: 11.4 MMOL/L (ref 5–15)
AST SERPL-CCNC: 15 U/L (ref 1–32)
BASOPHILS # BLD AUTO: 0.09 10*3/MM3 (ref 0–0.2)
BASOPHILS NFR BLD AUTO: 0.9 % (ref 0–1.5)
BILIRUB BLD-MCNC: NEGATIVE MG/DL
BILIRUB SERPL-MCNC: 0.3 MG/DL (ref 0–1.2)
BUN SERPL-MCNC: 10 MG/DL (ref 6–20)
BUN/CREAT SERPL: 11.6 (ref 7–25)
CALCIUM SPEC-SCNC: 10.2 MG/DL (ref 8.6–10.5)
CHLORIDE SERPL-SCNC: 104 MMOL/L (ref 98–107)
CLARITY, POC: CLEAR
CO2 SERPL-SCNC: 24.6 MMOL/L (ref 22–29)
COLOR UR: YELLOW
CREAT SERPL-MCNC: 0.86 MG/DL (ref 0.57–1)
DEPRECATED RDW RBC AUTO: 42.2 FL (ref 37–54)
EGFRCR SERPLBLD CKD-EPI 2021: 82.4 ML/MIN/1.73
EOSINOPHIL # BLD AUTO: 0.61 10*3/MM3 (ref 0–0.4)
EOSINOPHIL NFR BLD AUTO: 6.1 % (ref 0.3–6.2)
ERYTHROCYTE [DISTWIDTH] IN BLOOD BY AUTOMATED COUNT: 12.2 % (ref 12.3–15.4)
EXPIRATION DATE: ABNORMAL
FSH SERPL-ACNC: 48.5 MIU/ML
GLOBULIN UR ELPH-MCNC: 3.2 GM/DL
GLUCOSE SERPL-MCNC: 113 MG/DL (ref 65–99)
GLUCOSE UR STRIP-MCNC: NEGATIVE MG/DL
HBA1C MFR BLD: 5.7 % (ref 4.8–5.6)
HCT VFR BLD AUTO: 42.4 % (ref 34–46.6)
HGB BLD-MCNC: 14.1 G/DL (ref 12–15.9)
IMM GRANULOCYTES # BLD AUTO: 0.03 10*3/MM3 (ref 0–0.05)
IMM GRANULOCYTES NFR BLD AUTO: 0.3 % (ref 0–0.5)
KETONES UR QL: NEGATIVE
LEUKOCYTE EST, POC: ABNORMAL
LH SERPL-ACNC: 36 MIU/ML
LYMPHOCYTES # BLD AUTO: 3.95 10*3/MM3 (ref 0.7–3.1)
LYMPHOCYTES NFR BLD AUTO: 39.3 % (ref 19.6–45.3)
Lab: ABNORMAL
MCH RBC QN AUTO: 31.3 PG (ref 26.6–33)
MCHC RBC AUTO-ENTMCNC: 33.3 G/DL (ref 31.5–35.7)
MCV RBC AUTO: 94.2 FL (ref 79–97)
MONOCYTES # BLD AUTO: 0.54 10*3/MM3 (ref 0.1–0.9)
MONOCYTES NFR BLD AUTO: 5.4 % (ref 5–12)
NEUTROPHILS NFR BLD AUTO: 4.83 10*3/MM3 (ref 1.7–7)
NEUTROPHILS NFR BLD AUTO: 48 % (ref 42.7–76)
NITRITE UR-MCNC: NEGATIVE MG/ML
NRBC BLD AUTO-RTO: 0 /100 WBC (ref 0–0.2)
PH UR: 6 [PH] (ref 5–8)
PLATELET # BLD AUTO: 314 10*3/MM3 (ref 140–450)
PMV BLD AUTO: 11 FL (ref 6–12)
POTASSIUM SERPL-SCNC: 4.2 MMOL/L (ref 3.5–5.2)
PROT SERPL-MCNC: 7.4 G/DL (ref 6–8.5)
PROT UR STRIP-MCNC: NEGATIVE MG/DL
RBC # BLD AUTO: 4.5 10*6/MM3 (ref 3.77–5.28)
RBC # UR STRIP: ABNORMAL /UL
SODIUM SERPL-SCNC: 140 MMOL/L (ref 136–145)
SP GR UR: 1.01 (ref 1–1.03)
TSH SERPL DL<=0.05 MIU/L-ACNC: 2.83 UIU/ML (ref 0.27–4.2)
UROBILINOGEN UR QL: NORMAL
WBC NRBC COR # BLD AUTO: 10.05 10*3/MM3 (ref 3.4–10.8)

## 2024-11-25 PROCEDURE — 87077 CULTURE AEROBIC IDENTIFY: CPT | Performed by: FAMILY MEDICINE

## 2024-11-25 PROCEDURE — 36415 COLL VENOUS BLD VENIPUNCTURE: CPT | Performed by: FAMILY MEDICINE

## 2024-11-25 PROCEDURE — 83036 HEMOGLOBIN GLYCOSYLATED A1C: CPT | Performed by: FAMILY MEDICINE

## 2024-11-25 PROCEDURE — 80050 GENERAL HEALTH PANEL: CPT | Performed by: FAMILY MEDICINE

## 2024-11-25 PROCEDURE — 99214 OFFICE O/P EST MOD 30 MIN: CPT | Performed by: FAMILY MEDICINE

## 2024-11-25 PROCEDURE — 83002 ASSAY OF GONADOTROPIN (LH): CPT | Performed by: FAMILY MEDICINE

## 2024-11-25 PROCEDURE — 83001 ASSAY OF GONADOTROPIN (FSH): CPT | Performed by: FAMILY MEDICINE

## 2024-11-25 PROCEDURE — 87086 URINE CULTURE/COLONY COUNT: CPT | Performed by: FAMILY MEDICINE

## 2024-11-25 PROCEDURE — 87186 SC STD MICRODIL/AGAR DIL: CPT | Performed by: FAMILY MEDICINE

## 2024-11-25 PROCEDURE — 81003 URINALYSIS AUTO W/O SCOPE: CPT | Performed by: FAMILY MEDICINE

## 2024-11-25 RX ORDER — METRONIDAZOLE 500 MG/1
500 TABLET ORAL 3 TIMES DAILY
Qty: 21 TABLET | Refills: 0 | Status: SHIPPED | OUTPATIENT
Start: 2024-11-25

## 2024-11-25 RX ORDER — SULFAMETHOXAZOLE AND TRIMETHOPRIM 800; 160 MG/1; MG/1
1 TABLET ORAL 2 TIMES DAILY
Qty: 14 TABLET | Refills: 0 | Status: SHIPPED | OUTPATIENT
Start: 2024-11-25

## 2024-11-25 NOTE — PROGRESS NOTES
"Chief Complaint  Back Pain (Lower back - x 2 weeks), Nausea, Urinary Frequency, and Vaginal Discharge (W/ odor )    Subjective        Evelyn Garcia presents to [facility]  Back Pain  This is a new problem. The current episode started 1 to 4 weeks ago. The problem occurs constantly. The problem has been worse since onset. The pain is present in the lumbar spine. The quality of the pain is described as aching. Radiates to: left abdomen. The pain is moderate. Associated symptoms include abdominal pain. Pertinent negatives include no bladder incontinence, bowel incontinence, dysuria or fever.     Symptoms are new.   Onset was 1 to 4 weeks.   Symptoms occur intermittently.   Symptoms have been coming and going since onset.   Symptoms include abdominal pain and nausea.  Pertinent negative symptoms include no fever and no dysuria.   Urinary Frequency   This is a new problem. The current episode started 1 to 4 weeks ago. The problem occurs daily. The problem has been unchanged. There has been no fever. Associated symptoms include frequency and nausea.   Vaginal Discharge  The patient's primary symptoms include genital itching and vaginal discharge. This is a new problem. The current episode started in the past 7 days. The problem occurs daily. The problem has been unchanged. Associated symptoms include abdominal pain, back pain, frequency and nausea. Pertinent negatives include no dysuria or fever.       Objective   Vital Signs:  /81 (BP Location: Left arm, Patient Position: Sitting, Cuff Size: Adult)   Pulse 76   Temp 98.4 °F (36.9 °C) (Infrared)   Ht 152.4 cm (60\")   Wt 74.5 kg (164 lb 3.2 oz)   SpO2 96%   BMI 32.07 kg/m²   Estimated body mass index is 32.07 kg/m² as calculated from the following:    Height as of this encounter: 152.4 cm (60\").    Weight as of this encounter: 74.5 kg (164 lb 3.2 oz).            Physical Exam  Vitals and nursing note reviewed.   Constitutional:  "      General: She is not in acute distress.     Appearance: She is well-developed.   HENT:      Head: Normocephalic.   Eyes:      General: Lids are normal.      Conjunctiva/sclera: Conjunctivae normal.   Neck:      Thyroid: No thyroid mass or thyromegaly.      Trachea: Trachea normal.   Cardiovascular:      Rate and Rhythm: Normal rate and regular rhythm.      Heart sounds: Normal heart sounds.   Pulmonary:      Effort: Pulmonary effort is normal.      Breath sounds: Normal breath sounds.   Abdominal:      Palpations: Abdomen is soft.   Musculoskeletal:      Cervical back: Normal range of motion.   Lymphadenopathy:      Cervical: No cervical adenopathy.   Skin:     General: Skin is warm and dry.   Neurological:      Mental Status: She is alert and oriented to person, place, and time.   Psychiatric:         Attention and Perception: She is attentive.         Mood and Affect: Mood normal.         Speech: Speech normal.         Behavior: Behavior normal.        Result Review :  The following data was reviewed by: Kathia Cooper MD on 11/25/2024:  Common labs          1/29/2024    08:32 8/5/2024    09:01   Common Labs   Glucose 98  120    BUN 12  12    Creatinine 0.73  0.60    Sodium 141  138    Potassium 4.1  3.9    Chloride 105  102    Calcium 10.2  9.7    Albumin 4.7  4.6    Total Bilirubin 0.2  0.4    Alkaline Phosphatase 36  40    AST (SGOT) 17  18    ALT (SGPT) 29  33    WBC 10.53  13.29    Hemoglobin 14.0  14.4    Hematocrit 41.2  42.0    Platelets 313  294                Assessment and Plan   Diagnoses and all orders for this visit:    1. Urinary tract infection without hematuria, site unspecified (Primary)  -     POCT urinalysis dipstick, automated  -     sulfamethoxazole-trimethoprim (Bactrim DS) 800-160 MG per tablet; Take 1 tablet by mouth 2 (Two) Times a Day.  Dispense: 14 tablet; Refill: 0  -     CT Abdomen Pelvis Stone Protocol; Future  -     CBC & Differential    2. Hematuria, unspecified type  -      CT Abdomen Pelvis Stone Protocol; Future  -     CBC & Differential  -     Urine Culture - Urine, Urine, Clean Catch; Future  -     Urine Culture - Urine, Urine, Clean Catch    3. Acute vaginitis  -     metroNIDAZOLE (Flagyl) 500 MG tablet; Take 1 tablet by mouth 3 (Three) Times a Day.  Dispense: 21 tablet; Refill: 0    4. Hot flash, menopausal  -     FSH & LH; Future  -     TSH  -     Hemoglobin A1c  -     Comprehensive Metabolic Panel    5. Family history of thyroid disease  -     FSH & LH; Future  -     TSH  -     Hemoglobin A1c  -     Comprehensive Metabolic Panel    6. Family history of diabetes mellitus  -     FSH & LH; Future  -     TSH  -     Hemoglobin A1c  -     Comprehensive Metabolic Panel             Follow Up   No follow-ups on file.  Patient was given instructions and counseling regarding her condition or for health maintenance advice. Please see specific information pulled into the AVS if appropriate.

## 2024-11-27 LAB — BACTERIA SPEC AEROBE CULT: ABNORMAL

## 2024-12-04 ENCOUNTER — PATIENT MESSAGE (OUTPATIENT)
Dept: FAMILY MEDICINE CLINIC | Facility: CLINIC | Age: 49
End: 2024-12-04
Payer: COMMERCIAL

## 2024-12-04 RX ORDER — ONDANSETRON 4 MG/1
4 TABLET, ORALLY DISINTEGRATING ORAL EVERY 8 HOURS PRN
Qty: 20 TABLET | Refills: 1 | Status: SHIPPED | OUTPATIENT
Start: 2024-12-04

## 2025-02-10 ENCOUNTER — HOSPITAL ENCOUNTER (OUTPATIENT)
Facility: HOSPITAL | Age: 50
Setting detail: HOSPITAL OUTPATIENT SURGERY
Discharge: HOME | End: 2025-02-10
Attending: STUDENT IN AN ORGANIZED HEALTH CARE EDUCATION/TRAINING PROGRAM | Admitting: STUDENT IN AN ORGANIZED HEALTH CARE EDUCATION/TRAINING PROGRAM
Payer: COMMERCIAL

## 2025-02-10 ENCOUNTER — ANESTHESIA EVENT (OUTPATIENT)
Dept: GASTROENTEROLOGY | Facility: HOSPITAL | Age: 50
End: 2025-02-10
Payer: COMMERCIAL

## 2025-02-10 ENCOUNTER — ANESTHESIA (OUTPATIENT)
Dept: GASTROENTEROLOGY | Facility: HOSPITAL | Age: 50
End: 2025-02-10
Payer: COMMERCIAL

## 2025-02-10 VITALS
DIASTOLIC BLOOD PRESSURE: 75 MMHG | SYSTOLIC BLOOD PRESSURE: 117 MMHG | HEIGHT: 60 IN | BODY MASS INDEX: 31.37 KG/M2 | RESPIRATION RATE: 11 BRPM | TEMPERATURE: 98.1 F | WEIGHT: 159.8 LBS | HEART RATE: 72 BPM | OXYGEN SATURATION: 96 %

## 2025-02-10 DIAGNOSIS — Z12.11 ENCOUNTER FOR SCREENING COLONOSCOPY: ICD-10-CM

## 2025-02-10 PROCEDURE — 88305 TISSUE EXAM BY PATHOLOGIST: CPT | Performed by: STUDENT IN AN ORGANIZED HEALTH CARE EDUCATION/TRAINING PROGRAM

## 2025-02-10 PROCEDURE — 25010000002 PROPOFOL 10 MG/ML EMULSION

## 2025-02-10 PROCEDURE — 25010000002 LIDOCAINE PF 2% 2 % SOLUTION

## 2025-02-10 PROCEDURE — 25810000003 SODIUM CHLORIDE 0.9 % SOLUTION: Performed by: STUDENT IN AN ORGANIZED HEALTH CARE EDUCATION/TRAINING PROGRAM

## 2025-02-10 PROCEDURE — 45380 COLONOSCOPY AND BIOPSY: CPT | Performed by: STUDENT IN AN ORGANIZED HEALTH CARE EDUCATION/TRAINING PROGRAM

## 2025-02-10 PROCEDURE — 25810000003 SODIUM CHLORIDE 0.9 % SOLUTION

## 2025-02-10 PROCEDURE — 25010000002 ONDANSETRON PER 1 MG

## 2025-02-10 RX ORDER — SODIUM CHLORIDE, SODIUM LACTATE, POTASSIUM CHLORIDE, CALCIUM CHLORIDE 600; 310; 30; 20 MG/100ML; MG/100ML; MG/100ML; MG/100ML
30 INJECTION, SOLUTION INTRAVENOUS CONTINUOUS
Status: DISCONTINUED | OUTPATIENT
Start: 2025-02-10 | End: 2025-02-10 | Stop reason: HOSPADM

## 2025-02-10 RX ORDER — LIDOCAINE HYDROCHLORIDE 20 MG/ML
INJECTION, SOLUTION EPIDURAL; INFILTRATION; INTRACAUDAL; PERINEURAL AS NEEDED
Status: DISCONTINUED | OUTPATIENT
Start: 2025-02-10 | End: 2025-02-10 | Stop reason: SURG

## 2025-02-10 RX ORDER — ONDANSETRON 2 MG/ML
INJECTION INTRAMUSCULAR; INTRAVENOUS
Status: COMPLETED
Start: 2025-02-10 | End: 2025-02-10

## 2025-02-10 RX ORDER — SODIUM CHLORIDE 9 MG/ML
50 INJECTION, SOLUTION INTRAVENOUS CONTINUOUS
Status: DISCONTINUED | OUTPATIENT
Start: 2025-02-10 | End: 2025-02-10 | Stop reason: HOSPADM

## 2025-02-10 RX ORDER — SODIUM CHLORIDE 9 MG/ML
INJECTION, SOLUTION INTRAVENOUS CONTINUOUS PRN
Status: DISCONTINUED | OUTPATIENT
Start: 2025-02-10 | End: 2025-02-10 | Stop reason: SURG

## 2025-02-10 RX ORDER — ONDANSETRON 2 MG/ML
4 INJECTION INTRAMUSCULAR; INTRAVENOUS EVERY 6 HOURS PRN
Status: DISCONTINUED | OUTPATIENT
Start: 2025-02-10 | End: 2025-02-10 | Stop reason: HOSPADM

## 2025-02-10 RX ORDER — PROPOFOL 10 MG/ML
VIAL (ML) INTRAVENOUS AS NEEDED
Status: DISCONTINUED | OUTPATIENT
Start: 2025-02-10 | End: 2025-02-10 | Stop reason: SURG

## 2025-02-10 RX ADMIN — PROPOFOL 50 MG: 10 INJECTION, EMULSION INTRAVENOUS at 10:04

## 2025-02-10 RX ADMIN — ONDANSETRON 4 MG: 2 INJECTION INTRAMUSCULAR; INTRAVENOUS at 09:40

## 2025-02-10 RX ADMIN — PROPOFOL 70 MG: 10 INJECTION, EMULSION INTRAVENOUS at 10:07

## 2025-02-10 RX ADMIN — PROPOFOL 30 MG: 10 INJECTION, EMULSION INTRAVENOUS at 10:17

## 2025-02-10 RX ADMIN — SODIUM CHLORIDE 50 ML/HR: 9 INJECTION, SOLUTION INTRAVENOUS at 09:40

## 2025-02-10 RX ADMIN — SODIUM CHLORIDE: 9 INJECTION, SOLUTION INTRAVENOUS at 09:58

## 2025-02-10 RX ADMIN — LIDOCAINE HYDROCHLORIDE 100 MG: 20 INJECTION, SOLUTION EPIDURAL; INFILTRATION; INTRACAUDAL; PERINEURAL at 10:01

## 2025-02-10 RX ADMIN — PROPOFOL 50 MG: 10 INJECTION, EMULSION INTRAVENOUS at 10:11

## 2025-02-10 RX ADMIN — PROPOFOL 50 MG: 10 INJECTION, EMULSION INTRAVENOUS at 10:06

## 2025-02-10 RX ADMIN — PROPOFOL 50 MG: 10 INJECTION, EMULSION INTRAVENOUS at 10:13

## 2025-02-10 RX ADMIN — ONDANSETRON 4 MG: 2 INJECTION, SOLUTION INTRAMUSCULAR; INTRAVENOUS at 09:40

## 2025-02-10 RX ADMIN — PROPOFOL 100 MG: 10 INJECTION, EMULSION INTRAVENOUS at 10:01

## 2025-02-10 RX ADMIN — PROPOFOL 50 MG: 10 INJECTION, EMULSION INTRAVENOUS at 10:03

## 2025-02-10 RX ADMIN — PROPOFOL 80 MG: 10 INJECTION, EMULSION INTRAVENOUS at 10:10

## 2025-02-10 NOTE — ANESTHESIA PREPROCEDURE EVALUATION
Anesthesia Evaluation     Patient summary reviewed and Nursing notes reviewed   NPO Solid Status: > 8 hours  NPO Liquid Status: > 8 hours           Airway   Mallampati: II  TM distance: >3 FB  Neck ROM: full  No difficulty expected  Dental - normal exam     Pulmonary    Cardiovascular     (+) hypertension, hyperlipidemia      Neuro/Psych  (+) headaches  GI/Hepatic/Renal/Endo    (+) GERD    Musculoskeletal     Abdominal    Substance History      OB/GYN          Other                    Anesthesia Plan    ASA 2     MAC     intravenous induction     Anesthetic plan, risks, benefits, and alternatives have been provided, discussed and informed consent has been obtained with: patient.    Plan discussed with CRNA.    CODE STATUS:         
with patient

## 2025-02-10 NOTE — H&P
Colorectal Surgery Preop Note    ID:  Evelyn Garcia;     Chief Complaint  Screening colonoscopy     History of Present Illness  Evelyn Garcia is a 50 y.o. female who is here for Screening colonoscopy      Past Medical History  Past Medical History:   Diagnosis Date    Acid reflux disease     Anxiety     COVID     Hypertension      For further details please see prior notes and Health History Questionnaire scanned in Epic.  Past Surgical History  Past Surgical History:   Procedure Laterality Date     SECTION      X2    CHOLECYSTECTOMY      ENDOSCOPY N/A 7/3/2022    Procedure: ESOPHAGOGASTRODUODENOSCOPY;  Surgeon: Ashly Coronado MD;  Location: [facility] END[facility]COPY;  Service: Gastroenterology;  Laterality: N/A;  post op: Normal EGD    HERNIA REPAIR      X3    HYSTERECTOMY      RECTAL POLYPECTOMY       For further details please see prior notes and Health History Questionnaire scanned in Epic.  Family History  Family History   Problem Relation Age of Onset    Heart disease Mother     Heart disease Father      For further details please see prior notes and Health History Questionnaire scanned in Epic.  Social History  Social History     Tobacco Use    Smoking status: Never    Smokeless tobacco: Never   Vaping Use    Vaping status: Never Used   Substance Use Topics    Alcohol use: Yes     Comment: SOCIAL    Drug use: Never     For further details please see prior notes and Health History Questionnaire scanned in Epic.  Medication List    Current Facility-Administered Medications:     ondansetron (ZOFRAN) injection 4 mg, 4 mg, Intravenous, Q6H PRN, August Hicks MD, 4 mg at 02/10/25 0940    sodium chloride 0.9 % infusion, 50 mL/hr, Intravenous, Continuous, August Hicks MD, Last Rate: 50 mL/hr at 02/10/25 0940, 50 mL/hr at 02/10/25 0940  For further details please see prior notes and Health History Questionnaire scanned in Epic.  Allergies  Allergies   Allergen Reactions    Butorphanol Unknown - High Severity      Slows breathing-unresponsive      Drug [Casirivimab-Imdevimab] Other (See Comments)     Patient hot, nauseated, throat swelling, chest pain, headache,shaking uncontrollably    Hydromorphone Hcl Unknown - High Severity     Slows breathing -unresponsive    Monoclonal Antibodies Anaphylaxis     Patient had monoclonal antibody infusion and stated she was sent to er with reaction    Nalbuphine Unknown - High Severity    Latex, Natural Rubber Hives     Review of Systems  Pertinent positives noted in HPI.    Physical Exam  General:  No acute distress  Head: Normocephalic, atraumatic  CV: Regular rate, no edema, extremities warm and well perfused  Pulm: Symmetric chest rise, non-labored breathing  Neuro: Alert and oriented     Abdomen: Please see clinic note  Anorectal: Please see clinic note    Assessment  -Screening colonoscopy       Plan / Recommendations    1. Proceed to endo for screening colonoscopy       August Hicks MD  Colon and Rectal Surgery   uJanita Rivera

## 2025-02-10 NOTE — OP NOTE
[facility] [place], [place] Colorectal Surgery  Colonoscopy Examination     Name: Evelyn Garcia  : 1975  Date of Colonoscopy: 2/10/2025  Procedure: Average Risk Screening Colonoscopy  Surgeon: August Hicks MD   Anesthesia: Sedation   IV Fluids: refer to anesthesia record    Procedure Details:    Prior to the procedure, history and physical exam was performed. Patient's medication and allergies were reviewed. The risk and benefits of the procedure and sedation options and risks were discussed with the patient. All questions were answered and informed consent was obtained.    The patient was brought to the endoscopy suite and placed in the left lateral decubitus position. Conscious sedation was administered by the anesthesia team. Vital signs including blood pressure, heart rate, and oxygen saturation were monitored continuously throughout the procedure.    The colonoscope was introduced through the rectum and advanced through the entire colon under direct visualization. The scope was maneuvered carefully, and the mucosa of the rectum, sigmoid colon, descending colon, transverse colon, ascending colon, and cecum were thoroughly inspected. Adequate insufflation was maintained throughout the procedure for optimal visualization.    Findings:    Rectum: Normal appearance with no lesions,polyps, or abnormalities.  Sigmoid colon: diverticulosis. 5 mm diminutive polyp removed with cold biopsy forceps.                                               Descending colon: Normal appearance with no lesions,polyps, or abnormalities.  Transverse colon: Normal appearance with no lesions,polyps, or abnormalities.  Ascending colon: Normal appearance with no lesions,polyps, or abnormalities.  Cecum: Normal appearance with no lesions,polyps, or abnormalities.    Procedure Images:          Interventions:  Sigmoid ( 5 mm, diminutive) cold biopsy forceps    Specimens:  The removed polyp was retrieved and sent for histopathological examination to the pathology  department for further analysis.    Recommendation:     Repeat colonoscopy in 5 years     Conclusion:  The screening colonoscopy was completed successfully, and the entire colon was visualized without any complications. The patient tolerated the procedure well and was transferred to the recovery area in stable condition. Post-procedure instructions and follow-up were discussed with the patient and will be provided in written form.    August Hicks MD  Colon and Rectal Surgery   [facility] [place]-[address], [address]  T: [phone]       no distention/no masses palpable/soft/nontender/bowel sounds normal

## 2025-02-10 NOTE — DISCHARGE INSTRUCTIONS
A responsible adult should stay with you and you should rest quietly for the rest of the day.    Do not drink alcohol, drive, operate any heavy machinery or power tools or make any legal/important decisions for the next 24 hours.     Progress your diet as tolerated.  If you begin to experience severe pain, increased shortness of breath, racing heartbeat or a fever above 101 F, seek immediate medical attention.     Follow up with MD as instructed. Call office for results in 3 to 5 days if needed. Dr. Hicks [phone]    Findings:     Rectum: Normal appearance with no lesions,polyps, or abnormalities.  Sigmoid colon: diverticulosis. 5 mm diminutive polyp removed with cold biopsy forceps.                                               Descending colon: Normal appearance with no lesions,polyps, or abnormalities.  Transverse colon: Normal appearance with no lesions,polyps, or abnormalities.  Ascending colon: Normal appearance with no lesions,polyps, or abnormalities.  Cecum: Normal appearance with no lesions,polyps, or abnormalities.     Procedure Images:            Interventions:  Sigmoid ( 5 mm, diminutive) cold biopsy forceps     Specimens:  The removed polyp was retrieved and sent for histopathological examination to the pathology department for further analysis.     Recommendation:      Repeat colonoscopy in 5 years      Conclusion:  The screening colonoscopy was completed successfully, and the entire colon was visualized without any complications. The patient tolerated the procedure well and was transferred to the recovery area in stable condition. Post-procedure instructions and follow-up were discussed with the patient and will be provided in written form.     August Hicks MD  Colon and Rectal Surgery   [facility]   [address] [place], [place], [address]  T: [phone]

## 2025-02-10 NOTE — ANESTHESIA POSTPROCEDURE EVALUATION
Patient: Evelyn Garcia    Procedure Summary       Date: 02/10/25 Room / Location: [facility] ENDOSCOPY 4 / [facility] ENDOSCOPY    Anesthesia Start: 0958 Anesthesia Stop: 1021    Procedure: COLONOSCOPY WITH POLYPECTOMY X1 (Rectum) Diagnosis:       Encounter for screening colonoscopy      (Encounter for screening colonoscopy [Z12.11])    Surgeons: August Hicks MD Provider: Henrik Ledezma MD    Anesthesia Type: MAC ASA Status: 2            Anesthesia Type: MAC    Vitals  Vitals Value Taken Time   /75 02/10/25 1052   Temp     Pulse 67 02/10/25 1059   Resp 11 02/10/25 1051   SpO2 95 % 02/10/25 1058   Vitals shown include unfiled device data.        Post Anesthesia Care and Evaluation    Patient location during evaluation: PACU  Patient participation: complete - patient participated  Level of consciousness: awake  Pain scale: See nurse's notes for pain score.  Pain management: adequate    Airway patency: patent  Anesthetic complications: No anesthetic complications  PONV Status: none  Cardiovascular status: acceptable  Respiratory status: acceptable and spontaneous ventilation  Hydration status: acceptable    Comments: Patient seen and examined postoperatively; vital signs stable; SpO2 greater than or equal to 90%; cardiopulmonary status stable; nausea/vomiting adequately controlled; pain adequately controlled; no apparent anesthesia complications; patient discharged from anesthesia care when discharge criteria were met

## 2025-02-12 LAB
LAB AP CASE REPORT: NORMAL
PATH REPORT.FINAL DX SPEC: NORMAL
PATH REPORT.GROSS SPEC: NORMAL

## 2025-04-21 RX ORDER — LISINOPRIL 10 MG/1
10 TABLET ORAL DAILY
Qty: 90 TABLET | Refills: 1 | Status: SHIPPED | OUTPATIENT
Start: 2025-04-21

## 2025-04-21 RX ORDER — SERTRALINE HYDROCHLORIDE 100 MG/1
100 TABLET, FILM COATED ORAL DAILY
Qty: 90 TABLET | Refills: 1 | Status: SHIPPED | OUTPATIENT
Start: 2025-04-21

## (undated) DEVICE — SINGLE-USE BIOPSY FORCEPS: Brand: RADIAL JAW 4

## (undated) DEVICE — BITEBLOCK ENDO W/STRAP 60F A/ LF DISP

## (undated) DEVICE — KT SYR GEL ORISE SNGL PK 10ML

## (undated) DEVICE — PK ENDO GI 50